# Patient Record
Sex: FEMALE | Race: BLACK OR AFRICAN AMERICAN | Employment: OTHER | URBAN - METROPOLITAN AREA
[De-identification: names, ages, dates, MRNs, and addresses within clinical notes are randomized per-mention and may not be internally consistent; named-entity substitution may affect disease eponyms.]

---

## 2017-01-27 ENCOUNTER — OP HISTORICAL/CONVERTED ENCOUNTER (OUTPATIENT)
Dept: OTHER | Age: 82
End: 2017-01-27

## 2017-06-16 ENCOUNTER — OP HISTORICAL/CONVERTED ENCOUNTER (OUTPATIENT)
Dept: OTHER | Age: 82
End: 2017-06-16

## 2018-07-03 ENCOUNTER — OP HISTORICAL/CONVERTED ENCOUNTER (OUTPATIENT)
Dept: OTHER | Age: 83
End: 2018-07-03

## 2019-07-31 ENCOUNTER — OP HISTORICAL/CONVERTED ENCOUNTER (OUTPATIENT)
Dept: OTHER | Age: 84
End: 2019-07-31

## 2019-12-19 ENCOUNTER — OP HISTORICAL/CONVERTED ENCOUNTER (OUTPATIENT)
Dept: OTHER | Age: 84
End: 2019-12-19

## 2020-05-27 ENCOUNTER — IP HISTORICAL/CONVERTED ENCOUNTER (OUTPATIENT)
Dept: OTHER | Age: 85
End: 2020-05-27

## 2020-09-29 DIAGNOSIS — Z12.31 VISIT FOR SCREENING MAMMOGRAM: ICD-10-CM

## 2020-11-17 ENCOUNTER — HOSPITAL ENCOUNTER (OUTPATIENT)
Dept: MAMMOGRAPHY | Age: 85
Discharge: HOME | End: 2020-11-17
Attending: INTERNAL MEDICINE
Payer: MEDICARE

## 2020-11-17 PROCEDURE — 77067 SCR MAMMO BI INCL CAD: CPT

## 2020-12-02 ENCOUNTER — APPOINTMENT (OUTPATIENT)
Dept: CT IMAGING | Age: 85
DRG: 100 | End: 2020-12-02
Attending: PHYSICIAN ASSISTANT
Payer: MEDICARE

## 2020-12-02 ENCOUNTER — HOSPITAL ENCOUNTER (INPATIENT)
Age: 85
LOS: 13 days | Discharge: NURSING, REHAB OR LONG-TERM CARE | DRG: 100 | End: 2020-12-15
Attending: EMERGENCY MEDICINE | Admitting: HOSPITALIST
Payer: MEDICARE

## 2020-12-02 ENCOUNTER — APPOINTMENT (OUTPATIENT)
Dept: GENERAL RADIOLOGY | Age: 85
DRG: 100 | End: 2020-12-02
Attending: EMERGENCY MEDICINE
Payer: MEDICARE

## 2020-12-02 ENCOUNTER — APPOINTMENT (OUTPATIENT)
Dept: CT IMAGING | Age: 85
DRG: 100 | End: 2020-12-02
Attending: EMERGENCY MEDICINE
Payer: MEDICARE

## 2020-12-02 DIAGNOSIS — I67.9 CEREBROVASCULAR DISEASE: ICD-10-CM

## 2020-12-02 DIAGNOSIS — Z20.822 SUSPECTED COVID-19 VIRUS INFECTION: ICD-10-CM

## 2020-12-02 DIAGNOSIS — R56.9 FOCAL SEIZURE (HCC): Primary | ICD-10-CM

## 2020-12-02 LAB
ABO + RH BLD: NORMAL
ALBUMIN SERPL-MCNC: 3.1 G/DL (ref 3.5–5)
ALBUMIN/GLOB SERPL: 0.7 {RATIO} (ref 1.1–2.2)
ALP SERPL-CCNC: 79 U/L (ref 45–117)
ALT SERPL-CCNC: 9 U/L (ref 12–78)
ANION GAP SERPL CALC-SCNC: 8 MMOL/L (ref 5–15)
AST SERPL W P-5'-P-CCNC: 14 U/L (ref 15–37)
BASOPHILS # BLD: 0 K/UL (ref 0–0.1)
BASOPHILS NFR BLD: 0 % (ref 0–1)
BILIRUB SERPL-MCNC: 0.4 MG/DL (ref 0.2–1)
BLOOD GROUP ANTIBODIES SERPL: NEGATIVE
BUN SERPL-MCNC: 27 MG/DL (ref 6–20)
BUN/CREAT SERPL: 25 (ref 12–20)
CA-I BLD-MCNC: 9 MG/DL (ref 8.5–10.1)
CHLORIDE SERPL-SCNC: 110 MMOL/L (ref 97–108)
CO2 SERPL-SCNC: 26 MMOL/L (ref 21–32)
CREAT SERPL-MCNC: 1.1 MG/DL (ref 0.55–1.02)
DIFFERENTIAL METHOD BLD: ABNORMAL
EOSINOPHIL # BLD: 0.1 K/UL (ref 0–0.4)
EOSINOPHIL NFR BLD: 1 % (ref 0–7)
ERYTHROCYTE [DISTWIDTH] IN BLOOD BY AUTOMATED COUNT: 16.5 % (ref 11.5–14.5)
GLOBULIN SER CALC-MCNC: 4.4 G/DL (ref 2–4)
GLUCOSE BLD STRIP.AUTO-MCNC: 170 MG/DL (ref 65–100)
GLUCOSE SERPL-MCNC: 169 MG/DL (ref 65–100)
HCT VFR BLD AUTO: 32.1 % (ref 35–47)
HGB BLD-MCNC: 9.6 G/DL (ref 11.5–16)
IMM GRANULOCYTES # BLD AUTO: 0 K/UL (ref 0–0.04)
IMM GRANULOCYTES NFR BLD AUTO: 0 % (ref 0–0.5)
INR PPP: 1 (ref 0.9–1.1)
LACTATE SERPL-SCNC: 2.8 MMOL/L (ref 0.4–2)
LYMPHOCYTES # BLD: 0.8 K/UL (ref 0.8–3.5)
LYMPHOCYTES NFR BLD: 14 % (ref 12–49)
MCH RBC QN AUTO: 23.4 PG (ref 26–34)
MCHC RBC AUTO-ENTMCNC: 29.9 G/DL (ref 30–36.5)
MCV RBC AUTO: 78.3 FL (ref 80–99)
MONOCYTES # BLD: 0.5 K/UL (ref 0–1)
MONOCYTES NFR BLD: 8 % (ref 5–13)
NEUTS SEG # BLD: 4.7 K/UL (ref 1.8–8)
NEUTS SEG NFR BLD: 77 % (ref 32–75)
PERFORMED BY, TECHID: ABNORMAL
PLATELET # BLD AUTO: 222 K/UL (ref 150–400)
PMV BLD AUTO: 11.5 FL (ref 8.9–12.9)
POTASSIUM SERPL-SCNC: 3.4 MMOL/L (ref 3.5–5.1)
PROT SERPL-MCNC: 7.5 G/DL (ref 6.4–8.2)
PROTHROMBIN TIME: 13.7 SEC (ref 11.9–14.7)
RBC # BLD AUTO: 4.1 M/UL (ref 3.8–5.2)
SODIUM SERPL-SCNC: 144 MMOL/L (ref 136–145)
SPECIMEN EXP DATE BLD: NORMAL
WBC # BLD AUTO: 6 K/UL (ref 3.6–11)

## 2020-12-02 PROCEDURE — 99285 EMERGENCY DEPT VISIT HI MDM: CPT

## 2020-12-02 PROCEDURE — 36415 COLL VENOUS BLD VENIPUNCTURE: CPT

## 2020-12-02 PROCEDURE — 74011250636 HC RX REV CODE- 250/636

## 2020-12-02 PROCEDURE — 74011000636 HC RX REV CODE- 636: Performed by: EMERGENCY MEDICINE

## 2020-12-02 PROCEDURE — 93005 ELECTROCARDIOGRAM TRACING: CPT

## 2020-12-02 PROCEDURE — 74011250636 HC RX REV CODE- 250/636: Performed by: EMERGENCY MEDICINE

## 2020-12-02 PROCEDURE — 96374 THER/PROPH/DIAG INJ IV PUSH: CPT

## 2020-12-02 PROCEDURE — 86900 BLOOD TYPING SEROLOGIC ABO: CPT

## 2020-12-02 PROCEDURE — 74011250636 HC RX REV CODE- 250/636: Performed by: HOSPITALIST

## 2020-12-02 PROCEDURE — 70450 CT HEAD/BRAIN W/O DYE: CPT

## 2020-12-02 PROCEDURE — 65270000029 HC RM PRIVATE

## 2020-12-02 PROCEDURE — 85025 COMPLETE CBC W/AUTO DIFF WBC: CPT

## 2020-12-02 PROCEDURE — 85610 PROTHROMBIN TIME: CPT

## 2020-12-02 PROCEDURE — 96372 THER/PROPH/DIAG INJ SC/IM: CPT

## 2020-12-02 PROCEDURE — 71045 X-RAY EXAM CHEST 1 VIEW: CPT

## 2020-12-02 PROCEDURE — 82962 GLUCOSE BLOOD TEST: CPT

## 2020-12-02 PROCEDURE — 96375 TX/PRO/DX INJ NEW DRUG ADDON: CPT

## 2020-12-02 PROCEDURE — 80053 COMPREHEN METABOLIC PANEL: CPT

## 2020-12-02 PROCEDURE — 70496 CT ANGIOGRAPHY HEAD: CPT

## 2020-12-02 PROCEDURE — 74011000250 HC RX REV CODE- 250: Performed by: EMERGENCY MEDICINE

## 2020-12-02 PROCEDURE — 83605 ASSAY OF LACTIC ACID: CPT

## 2020-12-02 RX ORDER — LORAZEPAM 2 MG/ML
INJECTION INTRAMUSCULAR
Status: COMPLETED
Start: 2020-12-02 | End: 2020-12-02

## 2020-12-02 RX ORDER — LORAZEPAM 2 MG/ML
1 INJECTION INTRAMUSCULAR
Status: COMPLETED | OUTPATIENT
Start: 2020-12-02 | End: 2020-12-02

## 2020-12-02 RX ORDER — ATORVASTATIN CALCIUM 40 MG/1
40 TABLET, FILM COATED ORAL DAILY
Status: DISCONTINUED | OUTPATIENT
Start: 2020-12-03 | End: 2020-12-15 | Stop reason: HOSPADM

## 2020-12-02 RX ORDER — LEVETIRACETAM 10 MG/ML
1000 INJECTION INTRAVASCULAR ONCE
Status: COMPLETED | OUTPATIENT
Start: 2020-12-02 | End: 2020-12-02

## 2020-12-02 RX ORDER — LABETALOL HCL 20 MG/4 ML
20 SYRINGE (ML) INTRAVENOUS
Status: DISCONTINUED | OUTPATIENT
Start: 2020-12-02 | End: 2020-12-15 | Stop reason: HOSPADM

## 2020-12-02 RX ORDER — GUAIFENESIN 100 MG/5ML
81 LIQUID (ML) ORAL DAILY
Status: DISCONTINUED | OUTPATIENT
Start: 2020-12-03 | End: 2020-12-03

## 2020-12-02 RX ORDER — LORAZEPAM 2 MG/ML
5 INJECTION INTRAMUSCULAR
Status: COMPLETED | OUTPATIENT
Start: 2020-12-02 | End: 2020-12-02

## 2020-12-02 RX ORDER — LABETALOL HYDROCHLORIDE 5 MG/ML
80 INJECTION, SOLUTION INTRAVENOUS ONCE
Status: COMPLETED | OUTPATIENT
Start: 2020-12-02 | End: 2020-12-02

## 2020-12-02 RX ORDER — LEVETIRACETAM 5 MG/ML
500 INJECTION INTRAVASCULAR EVERY 12 HOURS
Status: DISCONTINUED | OUTPATIENT
Start: 2020-12-02 | End: 2020-12-06

## 2020-12-02 RX ADMIN — LABETALOL HYDROCHLORIDE 80 MG: 5 INJECTION INTRAVENOUS at 11:11

## 2020-12-02 RX ADMIN — LEVETIRACETAM 500 MG: 5 INJECTION INTRAVENOUS at 17:06

## 2020-12-02 RX ADMIN — LORAZEPAM 1 MG: 2 INJECTION INTRAMUSCULAR; INTRAVENOUS at 11:12

## 2020-12-02 RX ADMIN — LEVETIRACETAM 1000 MG: 10 INJECTION INTRAVENOUS at 14:44

## 2020-12-02 RX ADMIN — LORAZEPAM 5 MG: 2 INJECTION INTRAMUSCULAR; INTRAVENOUS at 11:18

## 2020-12-02 RX ADMIN — IOPAMIDOL 100 ML: 755 INJECTION, SOLUTION INTRAVENOUS at 13:04

## 2020-12-02 RX ADMIN — LORAZEPAM 1 MG: 2 INJECTION INTRAMUSCULAR at 11:12

## 2020-12-02 NOTE — ED PROVIDER NOTES
HPI   Chief Complaint   Patient presents with    Seizure   71-year-old female hx CVA with previous left side weakness 2019 presents with seizure. Per EMS patient's daughter called regarding seizure, they found patient awake alert oriented, complaining of left-sided headache and dizziness, glucose 200, patient's left eye was fluttering. On route patient began to lean to the left with gaze to the left and left facial droop. Unknown last normal.  When I saw the patient she was very lethargic and not able to give history or do review of system. Past Medical History:   Diagnosis Date    Breast cancer [facility])      History reviewed. No pertinent surgical history. No family history on file. ALLERGIES: Patient has no known allergies. Review of Systems   Unable to perform ROS: Mental status change       Vitals:    12/02/20 1042 12/02/20 1215   BP: (!) 218/100 112/66   Pulse: (!) 118 66   Resp: 16    Temp: 98.6 °F (37 °C)    SpO2: 94% 97%   Weight: 113.4 kg (250 lb)    Height: 5' 7\" (1.702 m)             Physical Exam   Patient Vitals for the past 8 hrs:   Temp Pulse Resp BP SpO2   12/02/20 1215  66  112/66 97 %   12/02/20 1042 98.6 °F (37 °C) (!) 118 16 (!) 218/100 94 %        Nursing note and vitals reviewed. Constitutional: Ill appearing. Lethargic. Head: Normocephalic and atraumatic. Mouth/Throat: Airway patent. Moist mucous membranes. Eyes: EOMI. No scleral icterus. Neck: Neck supple. Cardiovascular: Tachy rate, regular rhythm. Normal heart sounds. No murmur, rub, or gallop. Good pulses throughout. Pulmonary/Chest: No respiratory distress. Clear to auscultation bilaterally. No wheezes, rales, or rhonchi. Abdominal/GI: BS normal, Soft, non-tender, non-distended. No rebound or guarding. Musculoskeletal: No gross injuries or deformities. Neurological: Awake and oriented to self and place. LUE and LLE rhythmic tremors.  Not following commands on left side, following commands to move both RUE and RLE.  Left facial droop. Psych: Unable to assess. Skin: Skin is warm and dry. No rash noted. MDM   Ddx = intracranial hemorrhage, CVA, focal seizure, hypertensive encephalopathy, metabolic disarray, infection. EKG read by me at 1107 showing sinus tachycardia rate 111, several PACs, no STEMI, there is LVH. Normal axis and normal intervals. Patient was stroke alerted on arrival.  Patient persistently in partial left-sided seizure, for more than 15 minutes, given Ativan 5 mg IV. Patient seizure immediately stopped by she became sleepy, requiring 2 L of nasal cannula. Patient is loaded with 1 g of Keppra IV. Patient is given labetalol IV for her tachycardia, on reassessment her tachycardia improved. Patient initially went for CT head without contrast, not showing any acute hemorrhage. Consulted stroke tele neurologist recommends CTA head and neck, admission, EEG, MRI brain, Keppra. I reviewed CTA head and neck result as below, it incidentally found groundglass appearance in her lungs. I ordered COVID-19 test.  I consulted Dr. Carlito Badillo hospitalist, admitting.   Labs Reviewed   CBC WITH AUTOMATED DIFF - Abnormal; Notable for the following components:       Result Value    HGB 9.6 (*)     HCT 32.1 (*)     MCV 78.3 (*)     MCH 23.4 (*)     MCHC 29.9 (*)     RDW 16.5 (*)     NEUTROPHILS 77 (*)     All other components within normal limits   METABOLIC PANEL, COMPREHENSIVE - Abnormal; Notable for the following components:    Potassium 3.4 (*)     Chloride 110 (*)     Glucose 169 (*)     BUN 27 (*)     Creatinine 1.10 (*)     BUN/Creatinine ratio 25 (*)     GFR est AA 57 (*)     GFR est non-AA 47 (*)     AST (SGOT) 14 (*)     ALT (SGPT) 9 (*)     Albumin 3.1 (*)     Globulin 4.4 (*)     A-G Ratio 0.7 (*)     All other components within normal limits   LACTIC ACID - Abnormal; Notable for the following components:    Lactic acid 2.8 (*)     All other components within normal limits   GLUCOSE, POC - Abnormal; Notable for the following components:    Glucose (POC) 170 (*)     All other components within normal limits   PROTHROMBIN TIME + INR   URINALYSIS W/ REFLEX CULTURE   SARS-COV-2   TYPE & SCREEN     CTA CODE NEURO HEAD AND NECK W CONT   Final Result   Impression:    1. Mild nonflow-limiting narrowing of the right carotid bifurcation by NASA   criteria. 2. Moderate to high-grade flow-limiting narrowing of the distal M1 segment of   the right MCA. 3. Moderate flow-limiting narrowing of the origin of the anterior division of   the M2 segment of the left MCA. 4. Patchy groundglass opacity right upper lobe most consistent with pneumonia   with COVID-19 not excluded. CT chest without contrast recommended. Multiple attempts to reach the emergency department were unsuccessful. Please note that all carotid bifurcation stenoses are measured as per NASCET   criteria. CT CODE NEURO HEAD WO CONTRAST   Final Result   Impression: No acute intracranial process. Interval development of   encephalomalacia in the right parieto-occipital lobe. Findings conveyed to Nuria Branham on 12/2/2020 at 11:13 AM.                  XR CHEST SNGL V    (Results Pending)     Medications   LORazepam (ATIVAN) injection 1 mg (1 mg IntraMUSCular Given 12/2/20 1112)   labetaloL (NORMODYNE;TRANDATE) injection 80 mg (80 mg IntraVENous Given 12/2/20 1111)   LORazepam (ATIVAN) injection 5 mg (5 mg IntraVENous Given 12/2/20 1118)   levETIRAcetam in saline (iso-os) (KEPPRA) infusion 1,000 mg (1,000 mg IntraVENous New Bag 12/2/20 1444)   iopamidoL (ISOVUE-370) 76 % injection 100 mL (100 mL IntraVENous Given 12/2/20 1304)     Tejal RAZA MD, am  the first and primary ED provider for this patient. Critical Care  Performed by: Yasmine Salmon MD  Authorized by:  Yasmine Salmon MD     Critical care provider statement:     Critical care time (minutes):  60    Critical care time was exclusive of:  Separately billable procedures and treating other patients and teaching time    Critical care was necessary to treat or prevent imminent or life-threatening deterioration of the following conditions:  CNS failure or compromise    Critical care was time spent personally by me on the following activities:  Blood draw for specimens, development of treatment plan with patient or surrogate, discussions with consultants, evaluation of patient's response to treatment, examination of patient, obtaining history from patient or surrogate, review of old charts, re-evaluation of patient's condition, ordering and performing treatments and interventions, ordering and review of laboratory studies, ordering and review of radiographic studies and pulse oximetry

## 2020-12-02 NOTE — H&P
History and Physical    Subjective:   Chief Complaint : seizure-like activity                                Worsening left sided weakness prior to arrival.   Source of information : EMS, patients grand-daughter. granddaughter mentioned she isn't aware of her meds taken at home, but denies any h/o seizures or has a residual left sided weakness from stroke in 2019   History of present illness:     89F, h.o breast cancer and stroke (without residual weakness) with seizure like activity and left sided weakness prior to arrival.     From EMS and charts, family she was having seizure-like activity while at home . Describes as twitching of left eye few seconds. EMS was called in while enroute to ER, EMS noticed pt was repeating herself along with left sided hand weakness,leaning to the left, and gazing to the left  that developed during transportsi    talking to the grand daughter, at baseline she is ambulatory with walker. Also has a left sided weakness from stroke in 2019  Past Medical History:   Diagnosis Date    [facility]     [facility] (cerebrum) ([facility] 2019     History reviewed. No pertinent surgical history. History reviewed. No pertinent family history. Social History     Tobacco Use    Smoking status: Not on file   Substance Use Topics    Alcohol use: Not on file       Prior to Admission medications    Not on File     No Known Allergies          Review of Systems:  Unable to perform ROS: Mental status change     Vitals:     Visit Vitals  /66   Pulse 66   Temp 98.6 °F (37 °C)   Resp 16   Ht 5' 7\" (1.702 m)   Wt 113.4 kg (250 lb)   SpO2 97%   BMI 39.16 kg/m²       Physical Exam:   Constitutional: Ill appearing. Lethargic. Head: Normocephalic and atraumatic. Mouth/Throat: Airway patent. Moist mucous membranes. Eyes: EOMI. No scleral icterus. Neck: Neck supple. Cardiovascular: Tachy rate, regular rhythm. Normal heart sounds. No murmur, rub, or gallop.  Good pulses throughout. Pulmonary/Chest: No respiratory distress. Clear to auscultation bilaterally. No wheezes, rales, or rhonchi. Abdominal/GI: BS normal, Soft, non-tender, non-distended. No rebound or guarding. Musculoskeletal: No gross injuries or deformities. Neurological: Awake and oriented to self and place. LUE and LLE rhythmic tremors. Not following commands on left side, following commands to move both RUE and RLE. Left facial droop. Psych: Unable to assess. Skin: Skin is warm and dry. No rash noted. Data Review:   Recent Results (from the past 24 hour(s))   GLUCOSE, POC    Collection Time: 12/02/20 11:12 AM   Result Value Ref Range    Glucose (POC) 170 (H) 65 - 100 mg/dL    Performed by Kwame Owens    CBC WITH AUTOMATED DIFF    Collection Time: 12/02/20 11:30 AM   Result Value Ref Range    WBC 6.0 3.6 - 11.0 K/uL    RBC 4.10 3.80 - 5.20 M/uL    HGB 9.6 (L) 11.5 - 16.0 g/dL    HCT 32.1 (L) 35.0 - 47.0 %    MCV 78.3 (L) 80.0 - 99.0 FL    MCH 23.4 (L) 26.0 - 34.0 PG    MCHC 29.9 (L) 30.0 - 36.5 g/dL    RDW 16.5 (H) 11.5 - 14.5 %    PLATELET 802 721 - 758 K/uL    MPV 11.5 8.9 - 12.9 FL    NEUTROPHILS 77 (H) 32 - 75 %    LYMPHOCYTES 14 12 - 49 %    MONOCYTES 8 5 - 13 %    EOSINOPHILS 1 0 - 7 %    BASOPHILS 0 0 - 1 %    IMMATURE GRANULOCYTES 0 0.0 - 0.5 %    ABS. NEUTROPHILS 4.7 1.8 - 8.0 K/UL    ABS. LYMPHOCYTES 0.8 0.8 - 3.5 K/UL    ABS. MONOCYTES 0.5 0.0 - 1.0 K/UL    ABS. EOSINOPHILS 0.1 0.0 - 0.4 K/UL    ABS. BASOPHILS 0.0 0.0 - 0.1 K/UL    ABS. IMM.  GRANS. 0.0 0.00 - 0.04 K/UL    DF AUTOMATED     METABOLIC PANEL, COMPREHENSIVE    Collection Time: 12/02/20 11:30 AM   Result Value Ref Range    Sodium 144 136 - 145 mmol/L    Potassium 3.4 (L) 3.5 - 5.1 mmol/L    Chloride 110 (H) 97 - 108 mmol/L    CO2 26 21 - 32 mmol/L    Anion gap 8 5 - 15 mmol/L    Glucose 169 (H) 65 - 100 mg/dL    BUN 27 (H) 6 - 20 mg/dL    Creatinine 1.10 (H) 0.55 - 1.02 mg/dL    BUN/Creatinine ratio 25 (H) 12 - 20      GFR est AA 57 (L) >60 ml/min/1.73m2    GFR est non-AA 47 (L) >60 ml/min/1.73m2    Calcium 9.0 8.5 - 10.1 mg/dL    Bilirubin, total 0.4 0.2 - 1.0 mg/dL    AST (SGOT) 14 (L) 15 - 37 U/L    ALT (SGPT) 9 (L) 12 - 78 U/L    Alk. phosphatase 79 45 - 117 U/L    Protein, total 7.5 6.4 - 8.2 g/dL    Albumin 3.1 (L) 3.5 - 5.0 g/dL    Globulin 4.4 (H) 2.0 - 4.0 g/dL    A-G Ratio 0.7 (L) 1.1 - 2.2     PROTHROMBIN TIME + INR    Collection Time: 12/02/20 11:30 AM   Result Value Ref Range    Prothrombin time 13.7 11.9 - 14.7 sec    INR 1.0 0.9 - 1.1     LACTIC ACID    Collection Time: 12/02/20 11:30 AM   Result Value Ref Range    Lactic acid 2.8 (HH) 0.4 - 2.0 mmol/L   TYPE & SCREEN    Collection Time: 12/02/20 11:30 AM   Result Value Ref Range    Crossmatch Expiration 12/05/2020,2359     ABO/Rh(D) Bhargav Ricks Positive     Antibody screen Negative              Assessment and Plan :     (1) partial seizures Vs complex migraine : keppra 500mg BID. Seizures precaution. LA in AM.      (2) probable stroke: aspirin, statin, lipid panel, A1c, speech, PT OT tomorrow. Consult neurology    (3) HTN urgency: will order labetalol 10 Q4H PRN for BP> 542 systolic    (4) h/o CVA: complicates #1    (5) RENAN: s/t prerenal. LR bolus. Repeat in AM    (6) hypokalemia:     DVT ppx: heparin SubQ, pending home meds.      Signed By: Brijesh Frederick MD     December 2, 2020

## 2020-12-02 NOTE — CONSULTS
Neurology Consult Note    HPI  Ms. Lay Proctor is a 80 y.o.  female with a history significant for Stroke with LHB weakness, Seizures, Breast Cancer who presented with seizure like activity. Per chart review, patient reportedly had seizure-like activity seen by her daughter. EMS arrived and found patient complaining of a left-sided headache and dizziness. On transport to [facility] patient had some left eye fluttering along with left side gaze deviation and left facial droop. By this time physicians in the ED evaluated patient she was quite lethargic. Initial blood pressures were elevated to 220/100s and elevated pulse to the 120s. Patient was loaded with IV levetiracetam 1 g. Code stroke was initiated and teleneurology recommended CTA head/neck. Patient was not deemed a candidate for TPA. Patient is confused on examination and is oriented only to person. Home AEDs   Unknown     [facility]     [facility] but interval development of right parietooccipital infarct compared to [facility] from 12/2019    CTA Head/Neck  High-grade stenosis in the right M1  Moderate grade stenosis in the left M2  25 to 30% atherosclerotic disease in the right carotid bifurcation  Patchy groundglass opacities in the right upper lobe likely signifying pneumonia       Lab Studies  -Positive: LA 2.8      IMPRESSION  Ms. Lay Proctor is a 80 y.o.  female with the above history presented with seizure like activity. Patient reportedly had seizure witnessed by patient's daughter. When patient was being transported by EMS patient had left-sided gaze preference left facial droop and tremulousness on the left side of the body. Code stroke was initiated when patient arrived to [facility]. Patient's initial blood pressures were elevated as was her pulse.   Emergent CT head was negative for any acute findings but she did have an interval right parieto-occipital infarct compared to a CT from December 2019. Teleneurology recommended CTA head/neck which revealed high-grade stenosis in the right M1 and moderate grade stenosis in the left M2 but also revealed possible infection in the right upper lung. Patient was subsequently loaded with IV levetiracetam 1000. Etiology of patient's breakthrough seizure most likely related to underlying infection lowering her seizure threshold. Current encephalopathy possibly related to postictal phenomenon which can last up to 36 hours at times. Will continue levetiracetam 500 BID for now. Will obtain MRI brain to evaluate for subacute ischemia which may also contribute to a lowered seizure threshold. We will continue patient on antiplatelet and statin therapy per below for the time being. Patient will need further stroke work-up if MRI is consistent with acute/subacute ischemia. RECOMMENDATIONS      Breakthrough Seizure  Associated: Likely PNA  -Q4Hr NeuroChecks, TELE  -Stroke Prophylaxis: , Atorvastatin 40  --> Will consider dual antiplatelet therapy if patient has acute/subacute ischemia in the right MCA or left MCA territories.  -Seizure Precautions  -Continue levetiracetam 500 BID  -STAT IV Lorazepam 2 mg with any clinical seizure activity lasting > 3 minutes and contact Neurology for further recommendations  -F/U MRI Brain WO  --> If acute/subacute ischemia is present and patient will need TTE, TSH, HgbA1c and lipid panel      Diagnosis and plan was discussed extensively with patient who is in agreement with the above plan. They have been counseled regarding potential side effects of the interventions above and would like to proceed with the aforementioned plan.       Review of Systems  Unable to ascertain due to mental status    Physical/Neurological Exam  General: Elderly -American female  Cardiovascular: normal rate and rhythm   Pulmonary: CTAB   Gastrointestinal/Abdomen: soft w/hypoactive bowel sounds   Skin: warm and dry      Patient is awake and follow central/peripheral commands at times not persistently  Unable to properly test for aphasia but patient is able to produce speech; mild dysarthria  Pupils react to light bilaterally; EOM Intact   Blink to threat intact bilaterally  Unable to visualize optic disc had a retinal vessels on funduscopic examination   Intact to light touch on face bilaterally   No facial droop   No gross hearing loss   Tongue is midline   Motor   LUE: 2/5  LLE: Antigravity   RHB: Antigravity  No abnormal movements   Increased tone in the left hemibody especially the left upper extremity  Sensation to pain intact throughout  Reflexes: Absent bilateral ankles, trace in bilateral knees, 1+ in bilateral brachioradialis  Toes equivocal bilaterally  Gait deferred     NIHSS: 12      Past Medical History:   Diagnosis Date    [facility] ([facility]     [facility]      History reviewed. No pertinent surgical history. No Known Allergies  History reviewed. No pertinent family history. Relationships   Social connections    Talks on phone: Not on file    Gets together: Not on file    Attends Buddhism service: Not on file    Active member of club or organization: Not on file    Attends meetings of clubs or organizations: Not on file    Relationship status: Not on file         Medications  No current outpatient medications    Current Facility-Administered Medications   Medication Dose Route Frequency    levETIRAcetam (KEPPRA) 500 mg in saline (iso-osm) 100 mL IVPB (premix)  500 mg IntraVENous Q12H    [START ON 12/3/2020] aspirin chewable tablet 81 mg  81 mg Oral DAILY    [START ON 12/3/2020] atorvastatin (LIPITOR) tablet 40 mg  40 mg Oral DAILY     No current outpatient medications on file.         Objective  Temp:  [98.6 °F (37 °C)]   Pulse (Heart Rate):  []   BP: (112-218)/()   Resp Rate:  [16]   O2 Sat (%):  [94 %-97 %]   Weight:  [113.4 kg (250 lb)]   No intake or output data in the 24 hours ending 12/02/20 1536  Wt Readings from Last 3 Encounters:   12/02/20 113.4 kg (250 lb)        Labs  Recent Results (from the past 24 hour(s))   GLUCOSE, POC    Collection Time: 12/02/20 11:12 AM   Result Value Ref Range    Glucose (POC) 170 (H) 65 - 100 mg/dL    Performed by Zina Austin    CBC WITH AUTOMATED DIFF    Collection Time: 12/02/20 11:30 AM   Result Value Ref Range    WBC 6.0 3.6 - 11.0 K/uL    RBC 4.10 3.80 - 5.20 M/uL    HGB 9.6 (L) 11.5 - 16.0 g/dL    HCT 32.1 (L) 35.0 - 47.0 %    MCV 78.3 (L) 80.0 - 99.0 FL    MCH 23.4 (L) 26.0 - 34.0 PG    MCHC 29.9 (L) 30.0 - 36.5 g/dL    RDW 16.5 (H) 11.5 - 14.5 %    PLATELET 750 783 - 038 K/uL    MPV 11.5 8.9 - 12.9 FL    NEUTROPHILS 77 (H) 32 - 75 %    LYMPHOCYTES 14 12 - 49 %    MONOCYTES 8 5 - 13 %    EOSINOPHILS 1 0 - 7 %    BASOPHILS 0 0 - 1 %    IMMATURE GRANULOCYTES 0 0.0 - 0.5 %    ABS. NEUTROPHILS 4.7 1.8 - 8.0 K/UL    ABS. LYMPHOCYTES 0.8 0.8 - 3.5 K/UL    ABS. MONOCYTES 0.5 0.0 - 1.0 K/UL    ABS. EOSINOPHILS 0.1 0.0 - 0.4 K/UL    ABS. BASOPHILS 0.0 0.0 - 0.1 K/UL    ABS. IMM. GRANS. 0.0 0.00 - 0.04 K/UL    DF AUTOMATED     METABOLIC PANEL, COMPREHENSIVE    Collection Time: 12/02/20 11:30 AM   Result Value Ref Range    Sodium 144 136 - 145 mmol/L    Potassium 3.4 (L) 3.5 - 5.1 mmol/L    Chloride 110 (H) 97 - 108 mmol/L    CO2 26 21 - 32 mmol/L    Anion gap 8 5 - 15 mmol/L    Glucose 169 (H) 65 - 100 mg/dL    BUN 27 (H) 6 - 20 mg/dL    Creatinine 1.10 (H) 0.55 - 1.02 mg/dL    BUN/Creatinine ratio 25 (H) 12 - 20      GFR est AA 57 (L) >60 ml/min/1.73m2    GFR est non-AA 47 (L) >60 ml/min/1.73m2    Calcium 9.0 8.5 - 10.1 mg/dL    Bilirubin, total 0.4 0.2 - 1.0 mg/dL    AST (SGOT) 14 (L) 15 - 37 U/L    ALT (SGPT) 9 (L) 12 - 78 U/L    Alk.  phosphatase 79 45 - 117 U/L    Protein, total 7.5 6.4 - 8.2 g/dL    Albumin 3.1 (L) 3.5 - 5.0 g/dL    Globulin 4.4 (H) 2.0 - 4.0 g/dL    A-G Ratio 0.7 (L) 1.1 - 2.2     PROTHROMBIN TIME + INR Collection Time: 12/02/20 11:30 AM   Result Value Ref Range    Prothrombin time 13.7 11.9 - 14.7 sec    INR 1.0 0.9 - 1.1     LACTIC ACID    Collection Time: 12/02/20 11:30 AM   Result Value Ref Range    Lactic acid 2.8 (HH) 0.4 - 2.0 mmol/L   TYPE & SCREEN    Collection Time: 12/02/20 11:30 AM   Result Value Ref Range    Crossmatch Expiration 12/05/2020,2359     ABO/Rh(D) O Positive     Antibody screen Negative           Significant Diagnostic Studies  All images independently visualized    XR CHEST SNGL V   Final Result      CTA CODE NEURO HEAD AND NECK W CONT   Final Result   Impression:    1. Mild nonflow-limiting narrowing of the right carotid bifurcation by NASA   criteria. 2. Moderate to high-grade flow-limiting narrowing of the distal M1 segment of   the right MCA. 3. Moderate flow-limiting narrowing of the origin of the anterior division of   the M2 segment of the left MCA. 4. Patchy groundglass opacity right upper lobe most consistent with pneumonia   with COVID-19 not excluded. CT chest without contrast recommended. Multiple attempts to reach the emergency department were unsuccessful. Please note that all carotid bifurcation stenoses are measured as per NASCET   criteria. CT CODE NEURO HEAD WO CONTRAST   Final Result   Impression: No acute intracranial process. Interval development of   encephalomalacia in the right parieto-occipital lobe. Findings conveyed to [employer] on 12/2/2020 at 11:13 AM.                        This document has been prepared by the Dragon voice recognition system, typographical errors may have occurred.  Attempts have been made to correct errors, however inadvertent errors may persist.

## 2020-12-02 NOTE — ED TRIAGE NOTES
EMS was called for seizure upon arrival pt's daughter stated that she saw her eye flutter, no LOC or incont noted; pt was awake and oriented pt c/o left sided HA and dizziness ; during their transport EMS noticed pt was repeating herself along with left sided hand weakness,leaning to the left, and gazing to the left  that developed during transport

## 2020-12-03 ENCOUNTER — APPOINTMENT (OUTPATIENT)
Dept: MRI IMAGING | Age: 85
DRG: 100 | End: 2020-12-03
Attending: STUDENT IN AN ORGANIZED HEALTH CARE EDUCATION/TRAINING PROGRAM
Payer: MEDICARE

## 2020-12-03 LAB
ANION GAP SERPL CALC-SCNC: 6 MMOL/L (ref 5–15)
BUN SERPL-MCNC: 20 MG/DL (ref 6–20)
BUN/CREAT SERPL: 22 (ref 12–20)
CA-I BLD-MCNC: 9.5 MG/DL (ref 8.5–10.1)
CHLORIDE SERPL-SCNC: 110 MMOL/L (ref 97–108)
CHOLEST SERPL-MCNC: 190 MG/DL
CO2 SERPL-SCNC: 27 MMOL/L (ref 21–32)
CREAT SERPL-MCNC: 0.92 MG/DL (ref 0.55–1.02)
EST. AVERAGE GLUCOSE BLD GHB EST-MCNC: 117 MG/DL
GLUCOSE SERPL-MCNC: 113 MG/DL (ref 65–100)
HBA1C MFR BLD: 5.7 % (ref 4–5.6)
HDLC SERPL-MCNC: 59 MG/DL
HDLC SERPL: 3.2 {RATIO} (ref 0–5)
LDLC SERPL CALC-MCNC: 107 MG/DL (ref 0–100)
LIPID PROFILE,FLP: ABNORMAL
POTASSIUM SERPL-SCNC: 3.3 MMOL/L (ref 3.5–5.1)
PROCALCITONIN SERPL-MCNC: <0.05 NG/ML
SODIUM SERPL-SCNC: 143 MMOL/L (ref 136–145)
TRIGL SERPL-MCNC: 120 MG/DL (ref ?–150)
VLDLC SERPL CALC-MCNC: 24 MG/DL

## 2020-12-03 PROCEDURE — 97530 THERAPEUTIC ACTIVITIES: CPT

## 2020-12-03 PROCEDURE — 84145 PROCALCITONIN (PCT): CPT

## 2020-12-03 PROCEDURE — 80061 LIPID PANEL: CPT

## 2020-12-03 PROCEDURE — 65270000029 HC RM PRIVATE

## 2020-12-03 PROCEDURE — 94762 N-INVAS EAR/PLS OXIMTRY CONT: CPT

## 2020-12-03 PROCEDURE — 77010033678 HC OXYGEN DAILY

## 2020-12-03 PROCEDURE — 97161 PT EVAL LOW COMPLEX 20 MIN: CPT

## 2020-12-03 PROCEDURE — 92610 EVALUATE SWALLOWING FUNCTION: CPT

## 2020-12-03 PROCEDURE — 70551 MRI BRAIN STEM W/O DYE: CPT

## 2020-12-03 PROCEDURE — 97165 OT EVAL LOW COMPLEX 30 MIN: CPT

## 2020-12-03 PROCEDURE — 97166 OT EVAL MOD COMPLEX 45 MIN: CPT

## 2020-12-03 PROCEDURE — 36415 COLL VENOUS BLD VENIPUNCTURE: CPT

## 2020-12-03 PROCEDURE — 74011250636 HC RX REV CODE- 250/636: Performed by: HOSPITALIST

## 2020-12-03 PROCEDURE — 74011250637 HC RX REV CODE- 250/637: Performed by: HOSPITALIST

## 2020-12-03 PROCEDURE — 80048 BASIC METABOLIC PNL TOTAL CA: CPT

## 2020-12-03 PROCEDURE — 83036 HEMOGLOBIN GLYCOSYLATED A1C: CPT

## 2020-12-03 RX ORDER — LORAZEPAM 2 MG/ML
2 INJECTION INTRAMUSCULAR ONCE
Status: DISCONTINUED | OUTPATIENT
Start: 2020-12-03 | End: 2020-12-03

## 2020-12-03 RX ORDER — ASPIRIN 325 MG
325 TABLET ORAL DAILY
Status: DISCONTINUED | OUTPATIENT
Start: 2020-12-04 | End: 2020-12-15 | Stop reason: HOSPADM

## 2020-12-03 RX ORDER — LISINOPRIL 10 MG/1
10 TABLET ORAL DAILY
Status: DISCONTINUED | OUTPATIENT
Start: 2020-12-03 | End: 2020-12-05

## 2020-12-03 RX ORDER — AMLODIPINE BESYLATE 5 MG/1
10 TABLET ORAL DAILY
Status: DISCONTINUED | OUTPATIENT
Start: 2020-12-03 | End: 2020-12-15 | Stop reason: HOSPADM

## 2020-12-03 RX ORDER — HALOPERIDOL 5 MG/ML
5 INJECTION INTRAMUSCULAR ONCE
Status: COMPLETED | OUTPATIENT
Start: 2020-12-03 | End: 2020-12-03

## 2020-12-03 RX ORDER — CARVEDILOL 12.5 MG/1
12.5 TABLET ORAL 2 TIMES DAILY WITH MEALS
Status: DISCONTINUED | OUTPATIENT
Start: 2020-12-03 | End: 2020-12-05 | Stop reason: ALTCHOICE

## 2020-12-03 RX ADMIN — CARVEDILOL 12.5 MG: 12.5 TABLET, FILM COATED ORAL at 15:43

## 2020-12-03 RX ADMIN — LEVETIRACETAM 500 MG: 5 INJECTION INTRAVENOUS at 14:30

## 2020-12-03 RX ADMIN — AZITHROMYCIN MONOHYDRATE 500 MG: 500 INJECTION, POWDER, LYOPHILIZED, FOR SOLUTION INTRAVENOUS at 08:21

## 2020-12-03 RX ADMIN — AMLODIPINE BESYLATE 10 MG: 5 TABLET ORAL at 15:42

## 2020-12-03 RX ADMIN — ATORVASTATIN CALCIUM 40 MG: 40 TABLET, FILM COATED ORAL at 08:22

## 2020-12-03 RX ADMIN — HALOPERIDOL LACTATE 5 MG: 5 INJECTION, SOLUTION INTRAMUSCULAR at 11:00

## 2020-12-03 RX ADMIN — LEVETIRACETAM 500 MG: 5 INJECTION INTRAVENOUS at 03:46

## 2020-12-03 RX ADMIN — LISINOPRIL 10 MG: 10 TABLET ORAL at 15:43

## 2020-12-03 RX ADMIN — LABETALOL HYDROCHLORIDE 20 MG: 5 INJECTION, SOLUTION INTRAVENOUS at 03:46

## 2020-12-03 NOTE — PROGRESS NOTES
13: 10PM Outbound call to listed emergency contact Kirti Bah/cas @ [phone]. VM left w/direct contact information requesting a return phone call.          LAKIA Mcbride

## 2020-12-03 NOTE — PROGRESS NOTES
OCCUPATIONAL THERAPY EVALUATION  Patient: Erendira Pascual (81 y.o. female)  Date: 12/3/2020  Primary Diagnosis: Seizure [facility]) [R56.9]        Precautions: Fall risk, COVID r/o at time of eval, seizure       ASSESSMENT  Pt is an 81 y/o F with PMH of breast CA and CVA 2019, presenting to [facility] with seizure like activity and left sided weakness. Per notes, family described twitching of L eye for a few seconds, EMS noticed pt was repeating herself along with L sided hand weakness, leaning to left, and gazing to left that developed during transportation. Pt admitted 12/2/20 and being treated for partial seizures vs. complex migraine, probable stroke, HTN urgency, RENAN, hypokalemia, PNA. Pt received lying backwards supine in bed upon arrival with IV tangled, O2 off, and casual t-shirt and brief on. OT/PT roles explained and pt agreeable to evaluations at this time, AxO to person, place, and year (cues for month) however with intermittent confusion noted and difficulty following multi-step commands. Pt states she \"sometimes\" lives with her daughter and that her daughter helps her with ADLs at [facility]. Per notes, pt is ambulatory with a RW at baseline. Unable to gather any further social information at this time 2' to cognition. Based on current observations, pt presents with deficits in generalized strength/AROM (LUE>RUE), static/dynamic sitting balance, static/dynamic standing balance, functional activity tolerance, fine/gross motor coordination, cognition, and increased back pain impacting overall performance of ADLs and functional transfers/mobility. Pt currently requires mod A x2 for bed mobility, total A to don socks EOB, and max A to doff casual shirt/don clean gown while seated EOB. Oral care completed with max-total A with pt able to grasp oral swab however unable to follow through to clean out mouth. Sit><stand transfer completed with max A x2 from EOB and brief doffed with total A in standing.  Pt tolerates approx 30 seconds in standing before initiating transfer back onto bedside. Sit>supine completed with mod Ax2 and pt left resting comfortably with call bell/needs in reach, bed alarm set at end of session. Pt would benefit from continued skilled OT services during hospital stay and at next level of care to address current impairments and improve overall IND and safety with self cares and functional mobility upon d/c. OT recommending IRF vs SNF at d/c once medically appropriate, pending overall progress and verification of prior level of function. Other factors to consider for discharge: Family support, DME, time since onset, severity of deficits     Patient will benefit from skilled therapy intervention to address the above noted impairments. PLAN :  Recommendations and Planned Interventions: self care training, functional mobility training, therapeutic exercise, balance training, therapeutic activities, cognitive retraining, endurance activities, neuromuscular re-education, and patient education    Frequency/Duration: Patient will be followed by occupational therapy 5 times a week to address goals. Recommendation for discharge: (in order for the patient to meet his/her long term goals)  IRF vs. SNF pending progress and verification of PLOF    This discharge recommendation:  Has been made in collaboration with the attending provider and/or case management       SUBJECTIVE:   Patient stated Osman Allison I sit up?     OBJECTIVE DATA SUMMARY:   HISTORY:   Past Medical History:   Diagnosis Date    Breast cancer ([facility] [address]     Stroke (cerebrum) ([facility] [address] 2019   History reviewed. No pertinent surgical history. Expanded or extensive additional review of patient history:     Home Situation  Home Environment: Private residence  Living Alone: No    EXAMINATION OF PERFORMANCE DEFICITS:  Cognitive/Behavioral Status:  Neurologic State: Drowsy; Confused  Orientation Level: Oriented to place  Cognition: Decreased command following;Decreased attention/concentration    Hearing:   Appears WFL    Vision/Perceptual:     Appears grossly WFL, however difficult to assess at this time 2' to cognition      Range of Motion:  AROM: Generally decreased, functional(L UE limited)  PROM: Generally decreased, functional(L UE limited, increased tone throughout)   Limited sh flex/abd <90 degrees with increased tone noted throughout L UE. R UE appears grossly [facility] for ADL purposes. Strength:  Strength: Grossly decreased, non-functional(L UE 2-/5, R UE 3/5 grossly)    Coordination:  Coordination: Grossly decreased, non-functional(Unable to formally assess)  Fine Motor Skills-Upper: Left Impaired;Comment(R UE grossly decreased)    Gross Motor Skills-Upper: Comment(Grossly decreased bilaterally)    Tone & Sensation:  Tone: Abnormal(Hypertonic L UE)  Sensation: Intact     Balance:  Sitting: Impaired; With support  Sitting - Static: Fair (occasional)  Sitting - Dynamic: Poor (constant support)  Standing: Impaired; With support  Standing - Static: Poor  Standing - Dynamic : Poor    Functional Mobility and Transfers for ADLs:  Bed Mobility:  Rolling: Moderate assistance;Assist x2  Supine to Sit: Moderate assistance;Assist x2  Sit to Supine: Moderate assistance;Assist x2  Scooting: Moderate assistance;Assist x2    Transfers:  Sit to Stand: Maximum assistance;Assist x2  Stand to Sit: Maximum assistance;Assist x2    ADL Intervention and task modifications:    Upper Body [address] Gown: Maximum assistance    Lower Body Dressing Assistance  Socks: Total assistance (dependent)  Position Performed: Seated edge of bed    Toileting  Toileting Assistance: Total assistance(dependent)  Clothing Management: Total assistance (dependent)(to doff soiled brief)    Cognitive Retraining  Following Commands:  Follows one step commands/directions    Grooming:  Oral care: total Assistance with oral swab    Therapeutic Exercise:  Pt would benefit from UE HEP to improve overall UE AROM/strength and can be further educated in next treatment session. Functional Measure:    [address] [place] [id number] AM-PACTM \"6 Clicks\"                                                       Daily Activity Inpatient Short Form  How much help from another person does the patient currently need. .. Total; A Lot A Little None   1. Putting on and taking off regular lower body clothing? [x]  1 []  2 []  3 []  4   2. Bathing (including washing, rinsing, drying)? []  1 [x]  2 []  3 []  4   3. Toileting, which includes using toilet, bedpan or urinal? [x] 1 []  2 []  3 []  4   4. Putting on and taking off regular upper body clothing? []  1 [x]  2 []  3 []  4   5. Taking care of personal grooming such as brushing teeth? []  1 [x]  2 []  3 []  4   6. Eating meals? []  1 [x]  2 []  3 []  4   © 2007, Trustees of [address] [place] [id number], under license to [employer]. All rights reserved     Score: 10/24     Interpretation of Tool:  Represents clinically-significant functional categories (i.e. Activities of daily living). Percentage of Impairment CH    0%   CI    1-19% CJ    20-39% CK    40-59% CL    60-79% CM    80-99% CN     100%   AMPA  Score 6-24 24 23 20-22 15-19 10-14 7-9 6        Occupational Therapy Evaluation Charge Determination   History Examination Decision-Making   MEDIUM Complexity : Expanded review of history including physical, cognitive and psychosocial  history  MEDIUM Complexity : 3-5 performance deficits relating to physical, cognitive , or psychosocial skils that result in activity limitations and / or participation restrictions HIGH Complexity : Patient presents with comorbidities that affect occupational performance.  Signifigant modification of tasks or assistance (eg, physical or verbal) with assessment (s) is necessary to enable patient to complete evaluation       Based on the above components, the patient evaluation is determined to be of the following complexity level: MEDIUM  Pain Rating:  Pt reports back pain, however does not provide formal rating at this time    Activity Tolerance:   Fair    After treatment patient left in no apparent distress:    Supine in bed, Call bell within reach, Bed / chair alarm activated, and Side rails x 3    COMMUNICATION/EDUCATION:   The patients plan of care was discussed with: Physical therapist and Registered nurse. Patient/family agree to work toward stated goals and plan of care. This patients plan of care is appropriate for delegation to [facility].     OT/PT sessions occurred together for increased patient and clinician safety as pt requires A of 2 for mobility at this time    Thank you for this referral.  Leif Cole  Time Calculation: 33 mins   Problem: Self Care Deficits Care Plan (Adult)  Goal: *Acute Goals and Plan of Care (Insert Text)  Description: Pt will be SBA sup <> sit in prep for EOB ADLs  Pt will be SBA grooming sitting EOB  Pt will be min A LE dressing sitting EOB/long sit  Pt will be min A sit <>  prep for toileting LRAD  Pt will be min A toileting/toilet transfer/cloth mgmt LRAD  Pt will be SPV following UE HEP in prep for self care tasks      Outcome: Not Met

## 2020-12-03 NOTE — PROGRESS NOTES
Skin Assessment    Dual skin assessment completed by Holli Gillette RN and Awais Peralta RN. Patient skin intact with no areas of concern identified.

## 2020-12-03 NOTE — ED NOTES
TRANSFER - OUT REPORT:    Verbal report given to Dayron Nix on 4e, 405(name) on Marilin Villarreal  being transferred to 4e, 405(unit) for routine progression of care       Report consisted of patients Situation, Background, Assessment and   Recommendations(SBAR). Information from the following report(s) SBAR was reviewed with the receiving nurse. Lines:   Peripheral IV 12/02/20 Left;Posterior Hand (Active)       Peripheral IV 12/02/20 Anterior;Proximal;Right Forearm (Active)        Opportunity for questions and clarification was provided.       Patient transported with:   Monitor  Tech

## 2020-12-03 NOTE — PROGRESS NOTES
PHYSICAL THERAPY EVALUATION  Patient: Han Silverman (62 y.o. female)  Date: 12/3/2020  [facility]: [facility] [R56.9]        Precautions: falls, droplet plus (COVID pending)       ASSESSMENT  Based on the objective data described below, the patient presents with generalized weakness, impaired functional mobility, impaired amb, impaired balance, decreased activity tolerance, poor command following, confusion, and poor safety awareness. Pt was flipped upside down in bed with IV wrapped around, O2 off, saturated diaper, and no gown upon PT/OT entry. Pt A&O x self, place, and month but not year. Per pt report pt resides with \"sometimes\" with daughter. Per medical records pt was ambulatory at home PTA; h/o left sided CVA last year. Pt c/o pain and requesting to get, agreeable to evaluation. Pt required mod A x2 for bed mobility, mod A x2 supine <> sit, mod to max  Ax2 sit <> stand transfers x2 attempts unable to reach full standing position with gt belt, and RW. Unable to amb today. Pt demonstrates increased tone in left UE, poor AROM of bilateral UE and LE, difficulty following complex commands so unable to officially test sensation and coordination during evaluation. Pt did fair with session today with c/o pain and discomfort but unable to rate pain today. Pt will benefit from continued skilled PT to address above deficits and return to PLOF. Current PT DC recommendation IRF vs SNF pending progress and verification of PLOF.      Current Level of Function Impacting Discharge (mobility/balance): mod to max A x2    Other factors to consider for discharge: unconfirmed PLOF, h/o previous left sided CVA     PLAN :  Recommendations and Planned Interventions: bed mobility training, transfer training, gait training, therapeutic exercises, neuromuscular re-education, patient and family training/education and therapeutic activities      Frequency/Duration: Patient will be followed by physical therapy:  5 times a week to address goals. Recommendation for discharge: (in order for the patient to meet his/her long term goals)  IRF vs SNF    This discharge recommendation:  Has been made in collaboration with the attending provider and/or case management    IF patient discharges home will need the following DME: none         SUBJECTIVE:   Patient stated I want to get up.     OBJECTIVE DATA SUMMARY:   HISTORY:    Past Medical History:   Diagnosis Date    Breast cancer ([facility] [address]     Stroke (cerebrum) ([facility] [address] 2019   History reviewed. No pertinent surgical history. Home Situation  Home Environment: Private residence  Living Alone: No    EXAMINATION/PRESENTATION/DECISION MAKING:   Critical Behavior:  Neurologic State: Drowsy, Confused  Orientation Level: Oriented to place  Cognition: Decreased command following, Decreased attention/concentration     Hearing:     Skin:  Intact where visible   Edema: none noted; watch on left UE removed due to digging into skin; placed in room closet with clothes   Range Of Motion:  AROM: Generally decreased, functional(left UE limited left LE limited but functional )                       Strength:    Strength: Grossly decreased, non-functional(LE grossly 3-/5; left UE 2-/5, right UE 3-/5)                    Tone & Sensation:   Tone: Abnormal(increased tone noted in left UE)                              Coordination:  Coordination: Grossly decreased, non-functional  Vision:      Functional Mobility:  Bed Mobility:  Rolling: Moderate assistance;Assist x2  Supine to Sit: Moderate assistance;Assist x2  Sit to Supine: Moderate assistance;Assist x2  Scooting: Moderate assistance;Assist x2  Transfers:  Sit to Stand: Maximum assistance;Assist x2  Stand to Sit: Maximum assistance;Assist x2                       Balance:   Sitting: Impaired; With support  Sitting - Static: Fair (occasional)  Sitting - Dynamic: Poor (constant support)  Standing: Impaired; With support  Standing - Static: Poor  Standing - Dynamic : Poor  Ambulation/Gait Training:   unable to perform this session    Therapeutic Exercises:   Not completed this session     Functional Measure:    [facility] Mobility Inpatient Short Form  How much difficulty does the patient currently have. .. Unable A Lot A Little None   1. Turning over in bed (including adjusting bedclothes, sheets and blankets)? [] 1   [x] 2   [] 3   [] 4   2. Sitting down on and standing up from a chair with arms ( e.g., wheelchair, bedside commode, etc.)   [] 1   [x] 2   [] 3   [] 4   3. Moving from lying on back to sitting on the side of the bed? [] 1   [x] 2   [] 3   [] 4          How much help from another person does the patient currently need. .. Total A Lot A Little None   4. Moving to and from a bed to a chair (including a wheelchair)? [x] 1   [] 2   [] 3   [] 4   5. Need to walk in hospital room? [x] 1   [] 2   [] 3   [] 4   6. Climbing 3-5 steps with a railing? [x] 1   [] 2   [] 3   [] 4   © 2007, Trustees of [address] [place] [address] [address] [id number], under license to [employer]. All rights reserved     Score:  Initial: 9/24 Most Recent: X (Date: 12/3/2020 )   Interpretation of Tool:  Represents activities that are increasingly more difficult (i.e. Bed mobility, Transfers, Gait).   Score 24 23 22-20 19-15 14-10 9-7 6   Modifier CH CI CJ CK CL CM CN         Physical Therapy Evaluation Charge Determination   History Examination Presentation Decision-Making   HIGH Complexity :3+ comorbidities / personal factors will impact the outcome/ POC  HIGH Complexity : 4+ Standardized tests and measures addressing body structure, function, activity limitation and / or participation in recreation  LOW Complexity : Stable, uncomplicated  Other outcome measures [facility] 6  high      Based on the above components, the patient evaluation is determined to be of the following complexity level: LOW     Pain Rating:  Reports back pain but not able to rate on scale    Activity Tolerance:   Fair and requires rest breaks    After treatment patient left in no apparent distress:   Supine in bed, Call bell within reach, Bed / chair alarm activated and Side rails x 3 and nsg updated. GOALS:    Problem: Mobility Impaired (Adult and Pediatric)  Goal: *Acute Goals and Plan of Care (Insert Text)  Description: Pt will be I with LE HEP in 7 days. Pt will perform bed mobility with mod A x1 in 7 days. Pt will perform transfers with mod A x1 in 7 days. Pt will amb 10-25 feet with LRAD safely with mod A x1 in 7 days. Outcome: Not Met       COMMUNICATION/EDUCATION:   The patients plan of care was discussed with: Occupational therapist and Registered nurse. Patient is unable to participate in goal setting and plan of care. PT/OT sessions occurred together for increased safety of pt and clinician.     Thank you for this referral.  Cara Quarles, PT, DPT   Time Calculation: 33 mins

## 2020-12-03 NOTE — PROGRESS NOTES
Problem: Falls - Risk of  Goal: *Absence of Falls  Description: Document Nirmal Palmer Fall Risk and appropriate interventions in the flowsheet. Outcome: Progressing Towards Goal  Note: Fall Risk Interventions:  Mobility Interventions: Patient to call before getting OOB, PT Consult for mobility concerns, PT Consult for assist device competence    Mentation Interventions: Bed/chair exit alarm, Door open when patient unattended, More frequent rounding, Reorient patient    Medication Interventions: Evaluate medications/consider consulting pharmacy, Patient to call before getting OOB, Teach patient to arise slowly    Elimination Interventions: Call light in reach, Patient to call for help with toileting needs    History of Falls Interventions: Room close to nurse's station, Assess for delayed presentation/identification of injury for 48 hrs (comment for end date)         Problem: Patient Education: Go to Patient Education Activity  Goal: Patient/Family Education  Outcome: Progressing Towards Goal     Problem: Pressure Injury - Risk of  Goal: *Prevention of pressure injury  Description: Document Morgan Scale and appropriate interventions in the flowsheet.   Outcome: Progressing Towards Goal  Note: Pressure Injury Interventions:       Moisture Interventions: Limit adult briefs, Maintain skin hydration (lotion/cream), Minimize layers, Moisture barrier    Activity Interventions: Increase time out of bed, Pressure redistribution bed/mattress(bed type), PT/OT evaluation    Mobility Interventions: HOB 30 degrees or less, Pressure redistribution bed/mattress (bed type), PT/OT evaluation    Nutrition Interventions: Document food/fluid/supplement intake                     Problem: Patient Education: Go to Patient Education Activity  Goal: Patient/Family Education  Outcome: Progressing Towards Goal

## 2020-12-03 NOTE — PROGRESS NOTES
SPEECH LANGUAGE PATHOLOGY BEDSIDE SWALLOW EVALUATIONS  Patient: Nino Rajan  (89 y.o. )  Date: 12/3/2020  Primary Diagnosis:   Seizure  Precautions:  Droplet plus, fall, seizure, aspiration    ASSESSMENT :  Patient presents w/ moderate oropharyngeal dysphagia. Patient is lethargic difficult to keep aroused during evaluation. She is oriented x1, confused, and intermittently follows basic commands w/ max cues. L facial droop w/ difficulty opening L eye. Difficult to assess if patient presents w/ dysarthria vs reduced intelligibility s/t lethargy. Rec full speech language eval once more alert. Oral phase c/b reduced bolus awareness, reduced bolus acceptance, decreased bolus manipulation and slow A-P transit. Pharyngeal phase c/b mild swallow delay and HLE is wfl upon palpation. Delayed throat clear noted w/ thin trials. Patient will benefit from skilled intervention to address the above impairments. Patients rehabilitation potential is considered to be Fair     PLAN :  Recommendations and Planned Interventions:  Rec full NTL w/ strict asp/GERD precautions and crush meds. ONLY feed when alert and accepting. Frequency/Duration: Patient will be followed by speech-language pathology daily to address goals. Discharge Recommendations: SNF vs IRF pending progress     SUBJECTIVE:   Patient lethargic and limited verbalizations noted. OBJECTIVE:     Past Medical History:   Diagnosis Date    Bre[facility] ([facility] [address]     Stroke (cerebrum) ([facility] [address] 2019       CXR Results  (Last 48 hours)                 12/02/20 1513  XR CHEST SNGL V Final result    Narrative:  Chest single view. Comparison single view chest May 27, 2020       Hazy reticular markings more so through depended lungs. Suspect combination mild   interstitial edema superimposed upon chronic change. Persistent or recurrent   small to moderate volume dependent left pleural effusion.  Cardiac and   mediastinal structures unchanged noting thoracic aorta atherosclerosis. No   pneumothorax. Diet prior to admission: unknown  Current Diet:  DIET CARDIAC     Cognitive and Communication Status:  Neurologic State: Confused, Drowsy  Orientation Level: Oriented to person  Cognition: Decreased attention/concentration, Decreased command following, Impaired decision making           Swallowing Evaluation:   Oral Assessment:  Oral Assessment  Labial: Decreased rate;Decreased seal;Left droop  Dentition: Natural  Lingual: Decreased rate;Decreased strength  Velum: Unable to visualize  Mandible: No impairment  P.O. Trials:  Patient Position: upright in bed  Vocal quality prior to P.O.: Hoarse;Low volume  Consistency Presented: Nectar thick liquid;Pudding; Thin liquid  How Presented: SLP-fed/presented;Cup/sip;Spoon     Bolus Acceptance: Impaired  Bolus Formation/Control: Impaired  Type of Impairment: Delayed;Drooling;Premature spillage;Lip closure;Spillage  Propulsion: Delayed (# of seconds); Discoordination  Oral Residue: Less than 10% of bolus  Initiation of Swallow: Delayed (# of seconds)  Laryngeal Elevation: Functional  Aspiration Signs/Symptoms: Clear throat                Oral Phase Severity: Moderate  Pharyngeal Phase Severity : Mild-moderate  Voice:                 Vocal Quality: Hoarse;Low volume                               Pain:               After treatment:   Patient left in no apparent distress in bed, Call bell within reach, and Nursing notified    COMMUNICATION/EDUCATION:   Patient's confusion and alertness negatively impact swallow fxn. The patient's plan of care including recommendations, planned interventions, and recommended diet changes were discussed with: Registered nurse and Physician. Patient is unable to participate in goal setting and plan of care.     Problem: Dysphagia (Adult)  Goal: *Acute Goals and Plan of Care (Insert Text)  Description: Speech Therapy Swallow Goals  Initiated 12/3/2020  -Patient will tolerate full liquid diet with nectar thick liquids without clinical indicators of aspiration given moderate cues within 7 day(s). [ ] Not met  [ ]  MET   [ ] Progressing  [ ] Noemí Caro  -Patient will tolerate PO trials without clinical indicators of aspiration given minimal cues within 7 day(s). [ ] Not met  [ ]  MET   [ ] Progressing  [ ] Noemí Caro  -Patient will participate in modified barium swallow study within 7  day(s). [ ] Not met  [ ]  MET   [ ] Progressing  [ ] Noemí Caro  -Patient will demonstrate understanding of swallow safety precautions and aspiration precautions, diet recs with min cues within 7 day(s).         [ ] Not met  [ ]  MET   [ ] Nick Villalobos  [ ] Discontinue      Outcome: Initial   Thank you for this referral.  Sita Arvizu M.S., M.Ed., CCC-SLP  Time Calculation: 12 mins

## 2020-12-03 NOTE — PROGRESS NOTES
Reason for Admission:   Seizure                   RUR Score:  8% Low                   Plan for utilizing home health:   Prior [facility] in the past, and currently not open w/any [facility] agency. Desires HH at time of discharge. PCP: First and Last name:    Lorenzo Hebert MD     Name of Practice:    Are you a current patient: Yes/No: Yes   Approximate date of last visit: 4-5 MOS   Can you participate in a virtual visit with your PCP: Yes                    Current Advanced Directive/Advance Care Plan:  FULL Code. Listed emergency contact Lizy (granddadrea) voiced she's POA. \"I have to see if the bank has another copy, the original got messed up. My mom is her only child and she isn't involved. \"  SW acknowledged understanding. Writer informed [facility] if she isn't able to get POA paperwork she can try to obtain again otherwise, by law all decisions go to her mother. Granddaughter acknowledged understanding. Transition of Care Plan:                      Lives in her own home and a family friend lives there 24/7. Last seen PCP approx 4-5 months ago. [address] ([address]). Patient requires assistance w/ADL's & IADL's. Patient doesn't drive and relies on family for all transportation needs. No home O2. DME includes cane. Prior HH in the past.  Desires HH at time of discharge. Gave verbal consent for writer to send referrals out on the patient's behalf. No prior SNF or IRF in the past.      Granddaughter ([facility]) reports she's POA. Has to try and locate POA at the bank. If unable to get a copy from the bank will work on obtaining POA again. Patient has one daughter and per [employer] \"my mother isn't involved. \"  Explained if she's not able to obtain POA paperwork, legally all decisions will go to her mother. SW to follow up w/her re: POA paperwork. Care Management Interventions  PCP Verified by CM: Yes(4-5 MOS)  Mode of Transport at Discharge:  Other (see comment)(Transport)  Transition of Care Consult (CM Consult): Discharge Planning  Discharge Durable Medical Equipment: (No home O2.   DME (cane))  Current Support Network: Own Home(Ranch style home w/two steps to enter)  Confirm Follow Up Transport: Other (see comment)(Transport)  The Patient and/or Patient Representative was Provided with a Choice of Provider and Agrees with the Discharge Plan?: Yes  Discharge Location  Discharge Placement: Home with home health        LAKIA Esteban  U01

## 2020-12-03 NOTE — PROGRESS NOTES
Progress note    Subjective:   Chief Complaint : seizure-like activity                                Worsening left sided weakness prior to arrival.   Source of information : EMS, patients grand-daughter. granddaughter mentioned she isn't aware of her meds taken at home, but denies any h/o seizures or has a residual left sided weakness from stroke in 2019   History of present illness:     89F, h.o breast cancer and stroke (without residual weakness) with seizure like activity and left sided weakness prior to arrival.     Being treated for seizures with possible pneumonia. Rule out pneumonia. PLAN: continue azithromycin, r/o covid, keppra 500mg BID.     talking to the grand daughter, at baseline she is ambulatory with walker. Also has a left sided weakness from stroke in 2019  Past Medical History:   Diagnosis Date    Breast cancer ([facility]     Stroke (cerebrum) ([facility] 2019     History reviewed. No pertinent surgical history. History reviewed. No pertinent family history. Social History     Tobacco Use    Smoking status: Not on file   Substance Use Topics    Alcohol use: Not on file       Prior to Admission medications    Not on File     No Known Allergies          Review of Systems:  Unable to perform ROS: Mental status change     Vitals:     Visit Vitals  BP (!) 185/100   Pulse 93   Temp 96.8 °F (36 °C)   Resp 20   Ht 5' 7\" (1.702 m)   Wt 113.4 kg (250 lb)   SpO2 98%   BMI 39.16 kg/m²       Physical Exam:   Constitutional: Ill appearing. Lethargic. Head: Normocephalic and atraumatic. Mouth/Throat: Airway patent. Moist mucous membranes. Eyes: EOMI. No scleral icterus. Neck: Neck supple. Cardiovascular: Tachy rate, regular rhythm. Normal heart sounds. No murmur, rub, or gallop. Good pulses throughout. Pulmonary/Chest: No respiratory distress. Clear to auscultation bilaterally. No wheezes, rales, or rhonchi. Abdominal/GI: BS normal, Soft, non-tender, non-distended.  No rebound or guarding. Musculoskeletal: No gross injuries or deformities. Neurological: Awake and oriented to self and place. LUE and LLE rhythmic tremors. Not following commands on left side, following commands to move both RUE and RLE. Left facial droop. Psych: Unable to assess. Skin: Skin is warm and dry. No rash noted. Data Review:   Recent Results (from the past 24 hour(s))   GLUCOSE, POC    Collection Time: 12/02/20 11:12 AM   Result Value Ref Range    Glucose (POC) 170 (H) 65 - 100 mg/dL    Performed by Lexx Bush    CBC WITH AUTOMATED DIFF    Collection Time: 12/02/20 11:30 AM   Result Value Ref Range    WBC 6.0 3.6 - 11.0 K/uL    RBC 4.10 3.80 - 5.20 M/uL    HGB 9.6 (L) 11.5 - 16.0 g/dL    HCT 32.1 (L) 35.0 - 47.0 %    MCV 78.3 (L) 80.0 - 99.0 FL    MCH 23.4 (L) 26.0 - 34.0 PG    MCHC 29.9 (L) 30.0 - 36.5 g/dL    RDW 16.5 (H) 11.5 - 14.5 %    PLATELET 484 543 - 760 K/uL    MPV 11.5 8.9 - 12.9 FL    NEUTROPHILS 77 (H) 32 - 75 %    LYMPHOCYTES 14 12 - 49 %    MONOCYTES 8 5 - 13 %    EOSINOPHILS 1 0 - 7 %    BASOPHILS 0 0 - 1 %    IMMATURE GRANULOCYTES 0 0.0 - 0.5 %    ABS. NEUTROPHILS 4.7 1.8 - 8.0 K/UL    ABS. LYMPHOCYTES 0.8 0.8 - 3.5 K/UL    ABS. MONOCYTES 0.5 0.0 - 1.0 K/UL    ABS. EOSINOPHILS 0.1 0.0 - 0.4 K/UL    ABS. BASOPHILS 0.0 0.0 - 0.1 K/UL    ABS. IMM.  GRANS. 0.0 0.00 - 0.04 K/UL    DF AUTOMATED     METABOLIC PANEL, COMPREHENSIVE    Collection Time: 12/02/20 11:30 AM   Result Value Ref Range    Sodium 144 136 - 145 mmol/L    Potassium 3.4 (L) 3.5 - 5.1 mmol/L    Chloride 110 (H) 97 - 108 mmol/L    CO2 26 21 - 32 mmol/L    Anion gap 8 5 - 15 mmol/L    Glucose 169 (H) 65 - 100 mg/dL    BUN 27 (H) 6 - 20 mg/dL    Creatinine 1.10 (H) 0.55 - 1.02 mg/dL    BUN/Creatinine ratio 25 (H) 12 - 20      GFR est AA 57 (L) >60 ml/min/1.73m2    GFR est non-AA 47 (L) >60 ml/min/1.73m2    Calcium 9.0 8.5 - 10.1 mg/dL    Bilirubin, total 0.4 0.2 - 1.0 mg/dL    AST (SGOT) 14 (L) 15 - 37 U/L    ALT (SGPT) 9 (L) 12 - 78 U/L    Alk. phosphatase 79 45 - 117 U/L    Protein, total 7.5 6.4 - 8.2 g/dL    Albumin 3.1 (L) 3.5 - 5.0 g/dL    Globulin 4.4 (H) 2.0 - 4.0 g/dL    A-G Ratio 0.7 (L) 1.1 - 2.2     PROTHROMBIN TIME + INR    Collection Time: 12/02/20 11:30 AM   Result Value Ref Range    Prothrombin time 13.7 11.9 - 14.7 sec    INR 1.0 0.9 - 1.1     LACTIC ACID    Collection Time: 12/02/20 11:30 AM   Result Value Ref Range    Lactic acid 2.8 (HH) 0.4 - 2.0 mmol/L   TYPE & SCREEN    Collection Time: 12/02/20 11:30 AM   Result Value Ref Range    Crossmatch Expiration 12/05/2020,2359     ABO/Rh(D) Romero Cosme Positive     Antibody screen Negative              Assessment and Plan :     (1) partial seizures Vs complex migraine : keppra 500mg BID. Seizures precaution. LA in AM.      (2) probable stroke: aspirin, statin, lipid panel, A1c, speech. MRI today. (3) HTN urgency: will order labetalol 10 Q4H PRN for BP> 620 systolic    (4) h/o CVA: complicates #1    (5) RENAN: s/t prerenal. LR bolus. Repeat in AM    (6) hypokalemia:     (7) pneumonia: azithromycin. R/o COVID. DVT ppx: heparin SubQ, pending home meds.      Signed By: Adele Pedersen MD     December 3, 2020

## 2020-12-03 NOTE — PROGRESS NOTES
NEUROLOGY  PROGRESS NOTE    Admission History/Pertinent Events  Patricia Ledbetter is a 80y.o. year old female who presented on 12/2/2020. Patient has a past medical history of Stroke with LHB weakness, Seizures, Breast Cancer who presented with seizure like activity. Per chart review, patient reportedly had seizure-like activity seen by her daughter. EMS arrived and found patient complaining of a left-sided headache and dizziness. On transport to [facility] patient had some left eye fluttering along with left side gaze deviation and left facial droop. By this time physicians in the ED evaluated patient she was quite lethargic. Initial blood pressures were elevated to 220/100s and elevated pulse to the 120s. Patient was loaded with IV levetiracetam 1 g. Code stroke was initiated and teleneurology recommended CTA head/neck. Patient was not deemed a candidate for TPA. Home AEDs: None      ASSESSMENT/PLAN      Impression  Patient's MRI brain without any acute/subacute ischemia noted. Patient does have remote right posterior parietal infarct which could be patient's irritative zone for decreased seizure threshold. We will continue patient on levetiracetam per below and defer management of likely underlying infectious process to referring team's. Patient will benefit from outpatient EEG. Patient with significantly elevated systolic blood pressures yesterday and throughout the morning. We recommend to slowly bring this down to avoid hypertensive encephalopathy and avoid risk of hypovolemic ischemia. Cannot completely rule out a degree of posterior reversible encephalopathy syndrome given significantly elevated blood pressures. Given patient's mentation is likely back to her baseline as she is fully alert and oriented without any focal deficits that are new on examination no further neurologic work-up will be necessary at the current time.        [facility] WO  [facility]  Remote but interval development of right parietooccipital infarct compared to [facility] from 12/2019     CTA Head/Neck  High-grade stenosis in the right M1  Moderate grade stenosis in the left M2  25 to 30% atherosclerotic disease in the right carotid bifurcation  Patchy groundglass opacities in the right upper lobe likely signifying pneumonia    MRI Brain WO  [facility]  Remote right posterior parietal infarct  Generalized cerebral atrophy  [facility]    Lab Studies  -Positive: LA 2.8      Plan      Breakthrough Seizure  Associated: Likely PNA, HTN Emergency vs PRES  -Q4Hr NeuroChecks, TELE  -Stroke Prophylaxis: , Atorvastatin 40  -SBP Goal < 160  --> Do not drop systolic blood pressure by more than 25% every 4-6 hours which can lead to hypovolemia and watershed ischemia  -Seizure Precautions  -Continue levetiracetam 500 BID  -STAT IV Lorazepam 2 mg with any clinical seizure activity lasting > 3 minutes and contact Neurology for further recommendations  -Plan for outpatient EEG  -Management of infectious and metabolic derangements to referring teams    No further inpatient neurologic work-up is necessary at the current time. Patient to follow-up with neurology in 2 weeks from discharge. SUBJECTIVE   Patient with blood pressures in the 215/120 yesterday during the day per chart review. Patient's blood pressures elevated overnight in the morning with systolics up to the 278O. Patient on azithromycin for likely PNA. Patient fully alert and oriented this morning following central peripheral commands and knowing patient's current situation. Patient has no complaints this morning.       Physical/Neurological Exam  General: Elderly -American female     Patient is awake and alert and following central peripheral commands  Patient is oriented to person, place, time and situation  No appreciable expressive or receptive aphasia; mild dysarthria  Pupils react to light bilaterally; EOM Intact   Blink to threat intact bilaterally  No facial droop   Motor              LUE: 2/5  LLE: 4/5              RHB: 4+/5  No abnormal movements   Sensation to pain intact throughout      OBJECTIVE  Vital Signs  Temp:  [96.8 °F (36 °C)-98.6 °F (37 °C)]   Pulse (Heart Rate):  []   BP: (112-218)/()   Resp Rate:  [15-20]   O2 Sat (%):  [94 %-100 %]   Weight:  [113.4 kg (250 lb)]     MEDICATIONS    Current Facility-Administered Medications:     [START ON 12/4/2020] aspirin tablet 325 mg, 325 mg, Oral, DAILY, Miquel Smart MD    azithromycin (ZITHROMAX) 500 mg in 0.9% sodium chloride 250 mL (VIAL-MATE), 500 mg, IntraVENous, Q24H, Marc Eaton MD, 500 mg at 12/03/20 0821    levETIRAcetam (KEPPRA) 500 mg in saline (iso-osm) 100 mL IVPB (premix), 500 mg, IntraVENous, Q12H, Marc Eaton MD, 500 mg at 12/03/20 1430    atorvastatin (LIPITOR) tablet 40 mg, 40 mg, Oral, DAILY, Angeli Gentile MD, 40 mg at 12/03/20 5186    labetaloL (NORMODYNE;TRANDATE) 20 mg/4 mL (5 mg/mL) injection 20 mg, 20 mg, IntraVENous, Q4H PRN, Angeli Gentile MD, 20 mg at 12/03/20 5418      Labs: I've reviewed the labs for today     This document has been prepared by the Dragon voice recognition system, typographical errors may have occurred.  Attempts have been made to correct errors, however inadvertent errors may persist.

## 2020-12-04 LAB
ANION GAP SERPL CALC-SCNC: 10 MMOL/L (ref 5–15)
ATRIAL RATE: 111 BPM
BUN SERPL-MCNC: 21 MG/DL (ref 6–20)
BUN/CREAT SERPL: 25 (ref 12–20)
CA-I BLD-MCNC: 9.2 MG/DL (ref 8.5–10.1)
CALCULATED P AXIS, ECG09: 64 DEGREES
CALCULATED R AXIS, ECG10: -28 DEGREES
CALCULATED T AXIS, ECG11: 58 DEGREES
CHLORIDE SERPL-SCNC: 109 MMOL/L (ref 97–108)
CO2 SERPL-SCNC: 26 MMOL/L (ref 21–32)
CREAT SERPL-MCNC: 0.84 MG/DL (ref 0.55–1.02)
DIAGNOSIS, 93000: NORMAL
ERYTHROCYTE [DISTWIDTH] IN BLOOD BY AUTOMATED COUNT: 16.6 % (ref 11.5–14.5)
GLUCOSE SERPL-MCNC: 88 MG/DL (ref 65–100)
HCT VFR BLD AUTO: 29.6 % (ref 35–47)
HGB BLD-MCNC: 9.1 G/DL (ref 11.5–16)
LACTATE SERPL-SCNC: 0.9 MMOL/L (ref 0.4–2)
MCH RBC QN AUTO: 23.8 PG (ref 26–34)
MCHC RBC AUTO-ENTMCNC: 30.7 G/DL (ref 30–36.5)
MCV RBC AUTO: 77.5 FL (ref 80–99)
NRBC # BLD: 0 K/UL (ref 0–0.01)
NRBC BLD-RTO: 0 PER 100 WBC
P-R INTERVAL, ECG05: 158 MS
PLATELET # BLD AUTO: 188 K/UL (ref 150–400)
PMV BLD AUTO: 11.5 FL (ref 8.9–12.9)
POTASSIUM SERPL-SCNC: 3.1 MMOL/L (ref 3.5–5.1)
Q-T INTERVAL, ECG07: 356 MS
QRS DURATION, ECG06: 76 MS
QTC CALCULATION (BEZET), ECG08: 484 MS
RBC # BLD AUTO: 3.82 M/UL (ref 3.8–5.2)
SARS-COV-2, COV2: NORMAL
SODIUM SERPL-SCNC: 145 MMOL/L (ref 136–145)
VENTRICULAR RATE, ECG03: 111 BPM
WBC # BLD AUTO: 8.2 K/UL (ref 3.6–11)

## 2020-12-04 PROCEDURE — 74011250637 HC RX REV CODE- 250/637: Performed by: STUDENT IN AN ORGANIZED HEALTH CARE EDUCATION/TRAINING PROGRAM

## 2020-12-04 PROCEDURE — 92526 ORAL FUNCTION THERAPY: CPT

## 2020-12-04 PROCEDURE — 83605 ASSAY OF LACTIC ACID: CPT

## 2020-12-04 PROCEDURE — 80048 BASIC METABOLIC PNL TOTAL CA: CPT

## 2020-12-04 PROCEDURE — 97530 THERAPEUTIC ACTIVITIES: CPT

## 2020-12-04 PROCEDURE — 65270000029 HC RM PRIVATE

## 2020-12-04 PROCEDURE — 74011250636 HC RX REV CODE- 250/636: Performed by: HOSPITALIST

## 2020-12-04 PROCEDURE — 94762 N-INVAS EAR/PLS OXIMTRY CONT: CPT

## 2020-12-04 PROCEDURE — 85027 COMPLETE CBC AUTOMATED: CPT

## 2020-12-04 PROCEDURE — 87635 SARS-COV-2 COVID-19 AMP PRB: CPT

## 2020-12-04 PROCEDURE — 74011250637 HC RX REV CODE- 250/637: Performed by: HOSPITALIST

## 2020-12-04 RX ADMIN — AZITHROMYCIN MONOHYDRATE 500 MG: 500 INJECTION, POWDER, LYOPHILIZED, FOR SOLUTION INTRAVENOUS at 08:40

## 2020-12-04 RX ADMIN — LISINOPRIL 10 MG: 10 TABLET ORAL at 08:38

## 2020-12-04 RX ADMIN — AMLODIPINE BESYLATE 10 MG: 5 TABLET ORAL at 08:38

## 2020-12-04 RX ADMIN — LEVETIRACETAM 500 MG: 5 INJECTION INTRAVENOUS at 03:13

## 2020-12-04 RX ADMIN — LABETALOL HYDROCHLORIDE 20 MG: 5 INJECTION, SOLUTION INTRAVENOUS at 08:21

## 2020-12-04 RX ADMIN — CARVEDILOL 12.5 MG: 12.5 TABLET, FILM COATED ORAL at 16:02

## 2020-12-04 RX ADMIN — LEVETIRACETAM 500 MG: 5 INJECTION INTRAVENOUS at 15:47

## 2020-12-04 RX ADMIN — LABETALOL HYDROCHLORIDE 20 MG: 5 INJECTION, SOLUTION INTRAVENOUS at 15:52

## 2020-12-04 RX ADMIN — CARVEDILOL 12.5 MG: 12.5 TABLET, FILM COATED ORAL at 08:37

## 2020-12-04 RX ADMIN — ASPIRIN 325 MG ORAL TABLET 325 MG: 325 PILL ORAL at 08:39

## 2020-12-04 RX ADMIN — ATORVASTATIN CALCIUM 40 MG: 40 TABLET, FILM COATED ORAL at 08:39

## 2020-12-04 NOTE — PROGRESS NOTES
Enquiry made from lab at this time via phone indicates that Covid 19 lab order on 12/02/20 was not received by lab. Will follow up with attending if another one has to be taken  Provider Communication at [address]  Provider paged and wants another sample taken.

## 2020-12-04 NOTE — PROGRESS NOTES
PHYSICAL THERAPY TREATMENT  Patient: Lay Proctor (5 y.o. female)  Date: 12/4/2020  Diagnosis: [facility] [R56.9]   <principal problem not specified>       Precautions:    Chart, physical therapy assessment, plan of care and goals were reviewed. ASSESSMENT  Patient continues with skilled PT services and is slowly progressing towards goals. Pt semi supine in bed upon PT/OT arrival and agreeable to session. Patient was drowsy throughout session, and towards end of session patient having difficulty staying awake. Performed sup>sit with max A x 2. During sup>sit transfer patient demo'd heavy posterior lean which she would not correct with VC and when therapist tried to correct patient resistive at first. Patient sat EOB ~10 minutes with Max Assist for dynamic and static sitting balance. Performed 1 x 10 LAQ with AAROM, see details below. Patient A & O to self only. Recommending d/c to IRF once medically appropriate. Current Level of Function Impacting Discharge (mobility/balance): level            PLAN :  Patient continues to benefit from skilled intervention to address the above impairments. Continue treatment per established plan of care. to address goals. Recommendation for discharge: (in order for the patient to meet his/her long term goals)  Therapy 3 hours per day 5-7 days per week    This discharge recommendation:  Has been made in collaboration with the attending provider and/or case management    IF patient discharges home will need the following DME: to be determined (TBD)       SUBJECTIVE:   Patient stated I don't know what month it is    OBJECTIVE DATA SUMMARY:   Critical Behavior:  Neurologic State: Drowsy  Orientation Level: Oriented to person  Cognition: Decreased attention/concentration     Functional Mobility Training:  Bed Mobility:  Rolling: Maximum assistance;Assist x2  Supine to Sit: Maximum assistance;Assist x2  Sit to Supine:  Total Assistance x 2   Scooting: Maximum assistance;Assist x2        Transfers:  STS not attempted due to poor sitting balance. Balance:  Sitting: Impaired; With support  Sitting - Static: Poor (constant support)  Sitting - Dynamic: Poor (constant support)      Therapeutic Exercises:   1 x 10 LAQ while sitting on EOB. Required AAROM. Max assist for dyamic sitting balance. Pain Ratin/10    Activity Tolerance:   Poor  Please refer to the flowsheet for vital signs taken during this treatment. After treatment patient left in no apparent distress:   Supine in bed, Call bell within reach, Bed / chair alarm activated, and Side rails x 3    COMMUNICATION/COLLABORATION:   The patients plan of care was discussed with: Occupational therapist.     OT/PT sessions occurred together for increased patient and clinician safety      Problem: Mobility Impaired (Adult and Pediatric)  Goal: *Acute Goals and Plan of Care (Insert Text)  Description: Pt will be I with LE HEP in 7 days. Pt will perform bed mobility with mod A x1 in 7 days. Pt will perform transfers with mod A x1 in 7 days. Pt will amb 10-25 feet with LRAD safely with mod A x1 in 7 days.          Outcome: Progressing Towards Goal       Flakito Chiu PTA   Time Calculation: 20 mins

## 2020-12-04 NOTE — PROGRESS NOTES
13: 15PM Referral sent to [facility]. Giacomo Mccullough, LAKIA        THERESA spoke w/listed emergency contact [facility] (grand daughter). THERESA informed her d/c recommendation is IRF vs SNF. Emergency contact gave verbal consent for writer to send a referral to [facility] for inpatient rehab. Referral to be sent via [address]. Patient will require auth.        Giacomo Mccullough, LAKIA

## 2020-12-04 NOTE — PROGRESS NOTES
Problem: Self Care Deficits Care Plan (Adult)  Goal: *Acute Goals and Plan of Care (Insert Text)  Description: Pt will be SBA sup <> sit in prep for EOB ADLs  Pt will be SBA grooming sitting EOB  Pt will be min A LE dressing sitting EOB/long sit  Pt will be min A sit <>  prep for toileting LRAD  Pt will be min A toileting/toilet transfer/cloth mgmt LRAD  Pt will be SPV following UE HEP in prep for self care tasks      Outcome: Progressing Towards Goal     OCCUPATIONAL THERAPY TREATMENT  Patient: Phillip Barron (32 y.o. female)  Date: 12/4/2020  Diagnosis: Seizure ([facility] [R56.9]   <principal problem not specified>       Precautions:  Falls, elevated LUE  Chart, occupational therapy assessment, plan of care, and goals were reviewed. ASSESSMENT  Patient continues with skilled OT services and is progressing towards goals. Pt received semi supine in bed and agreeable for OT/PT tx however very confused and requiring max cueing to participate with therapy as pt drowsy. Pt  requiring max Ax2 sup->sit and scooting EOB and to maintain balance EOB as pt initially demonstrating heavy posterior lean/extensor tone however able to bend at hips with max Ax2. Pt working on EOB sitting balance in prep for EOB self care tasks and is max A/total A simple grooming EOB simulated. Pt extremely drowsy and returned sit->supine with total Ax2 and repositioned with LUE on pillow. Pt would benefit from skilled OT services while at [facility] in order to increase safety and independence with self care and functional transfers/mobility. Recommend discharge to IRF when medically appropriate as per CM note pt was walking with walker prior to admission. Other factors to consider for discharge: time since onset, severity of deficits         PLAN :  Patient continues to benefit from skilled intervention to address the above impairments. Continue treatment per established plan of care. to address goals.     Recommend with staff: bed level only    Recommend next OT session: 2 person assist    Recommendation for discharge: (in order for the patient to meet his/her long term goals)  IRF    This discharge recommendation:  Has been made in collaboration with the attending provider and/or case management    IF patient discharges home will need the following DME: TBD       SUBJECTIVE:   Patient stated yes when asked if pt is tired    OBJECTIVE DATA SUMMARY:   Cognitive/Behavioral Status:  Neurologic State: Drowsy  Orientation Level: Oriented to person  Cognition: Decreased attention/concentration        Functional Mobility and Transfers for ADLs:  Bed Mobility:  Rolling: Maximum assistance;Assist x2  Supine to Sit: Maximum assistance;Assist x2  Sit to Supine: Total assistance;Assist x2  Scooting: Maximum assistance;Assist x2    Transfers:     Balance:  Sitting: Impaired; With support  Sitting - Static: Poor (constant support)  Sitting - Dynamic: Poor (constant support)    ADL Intervention:     Grooming  Grooming Assistance: Maximum assistance; Total assistance(dependent)(simulated)  Position Performed: Seated edge of bed    Pain:  No pain reported    Activity Tolerance:   Poor and requires rest breaks  Please refer to the flowsheet for vital signs taken during this treatment. After treatment patient left in no apparent distress:   Supine in bed, Call bell within reach, Bed / chair alarm activated, and Side rails x 3    COMMUNICATION/COLLABORATION:   The patients plan of care was discussed with: Physical therapy assistant and Case management. PT/OT sessions occurred together for increased safety of pt and clinicianCallie Bailey  Time Calculation: 20 mins

## 2020-12-04 NOTE — ROUTINE PROCESS
.Bedside and Verbal shift change report given to Emmanuel Castro RN (oncoming nurse) by Nick Carranza (offgoing nurse). Report included the following information SBAR, Kardex, MAR, Accordion and Cardiac Rhythm Sinus Tach. Pascale Rodriguez

## 2020-12-04 NOTE — PROGRESS NOTES
BEDSIDE SWALLOW TREATMENT  Patient: Ander Duverney (76 y.o. female)  Date: 12/4/2020  Diagnosis: Seizure ([facility] [address] [R56.9]          Precautions:  SEIZURE, FALL, ASPIRATION    ASSESSMENT :  Patient presents w/ mild oropharyngeal dysphagia. Patient is more alert but drowsy, oriented x3, and follows basic commands. L facial droop resolved. MRI results noted. Oral phase c/b reduced bolus manipulation and slow A-P transit. Pharyngeal phase c/b mild swallow delay and HLE is wfl upon palpation. Overt s/s of pen/asp c/b coughing w/ thin. Patient will benefit from skilled intervention to address the above impairments. Patients rehabilitation potential is considered to be Good      PLAN :  Recommendations and Planned Interventions:  Rec full NTL w/ strict asp/GERD precautions and crush meds. ONLY feed when alert and accepting. Upgrade once more alert. Frequency/Duration: Patient will be followed by speech-language pathology daily to address goals. Discharge Recommendations: SNF vs IRF pending progress     SUBJECTIVE:   Patient reports she is uncomfortable. 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 Comparison single view chest May 27, 2020       Hazy reticular markings more so through depended lungs. Suspect combination mild   interstitial edema superimposed upon chronic change. Persistent or recurrent   small to moderate volume dependent left pleural effusion. Cardiac and   mediastinal structures unchanged noting thoracic aorta atherosclerosis. No   pneumothorax.                Cognitive and Communication Status:  Neurologic State: Drowsy  Orientation Level: Oriented to person, Oriented to place, Oriented to time  Cognition: Decreased attention/concentration, Decreased command following           Dysphagia Treatment:  Oral Assessment:     P.O. Trials:  Patient Position: upright in bed  Vocal quality prior to P.O.: Hoarse  Consistency Presented: Nectar thick liquid;Puree; Thin liquid  How Presented: SLP-fed/presented;Cup/sip;Spoon     Bolus Acceptance: No impairment  Bolus Formation/Control: Impaired  Type of Impairment: Delayed  Propulsion: Delayed (# of seconds)  Oral Residue: Less than 10% of bolus  Initiation of Swallow: Delayed (# of seconds)  Laryngeal Elevation: Functional  Aspiration Signs/Symptoms: Weak cough                Oral Phase Severity: Minimal  Pharyngeal Phase Severity : Mild    Pain:           After treatment:   Patient left in no apparent distress in bed, Call bell within reach, and Nursing notified     COMMUNICATION/EDUCATION:   Patient's confusion and alertness negatively impact swallow fxn. The patient's plan of care including recommendations, planned interventions, and recommended diet changes were discussed with: Registered nurse and Physician. Thank you for this referral.  Amy Santos M.S., M.Ed., CCC-SLP  Time Calculation: 16 mins    Problem: Dysphagia (Adult)  Goal: *Acute Goals and Plan of Care (Insert Text)  Description: Speech Therapy Swallow Goals  Initiated 12/3/2020  -Patient will tolerate full liquid diet with nectar thick liquids without clinical indicators of aspiration given moderate cues within 7 day(s). [ ] Not met  [ ]  MET   [ ] Progressing  [ ] Rosangela Cindy  -Patient will tolerate PO trials without clinical indicators of aspiration given minimal cues within 7 day(s). [ ] Not met  [ ]  MET   [ ] Progressing  [ ] Rosangela Cindy  -Patient will participate in modified barium swallow study within 7  day(s). [ ] Not met  [ ]  MET   [ ] Progressing  [ ] Rosangela Cindy  -Patient will demonstrate understanding of swallow safety precautions and aspiration precautions, diet recs with min cues within 7 day(s). [ ] Not met  [ ]  MET   [ ] Progressing  [ ] Discontinue      Outcome: Progressing Towards Goal

## 2020-12-04 NOTE — PROGRESS NOTES
Progress note    Subjective:   Chief Complaint : seizure-like activity                                Worsening left sided weakness prior to arrival.   Source of information : EMS, patients grand-daughter. granddaughter mentioned she isn't aware of her meds taken at home, but denies any h/o seizures or has a residual left sided weakness from stroke in 2019   History of present illness:     89F, h.o breast cancer and stroke (without residual weakness) with seizure like activity and left sided weakness prior to arrival.     Being treated for seizures with possible pneumonia. Rule out pneumonia.     talking to the grand daughter, at baseline she is ambulatory with walker. Also has a left sided weakness from stroke in 2019    PLAN:  Continue azithromycin for 5 more days  KEPPRA 500mg BID   Likely IRF SNF, likely be medically stable by Monday or Tuesday. Past Medical History:   Diagnosis Date    Breast cancer ([facility] [address]     [facility]     History reviewed. No pertinent surgical history. History reviewed. No pertinent family history. Social History     Tobacco Use    Smoking status: Not on file   Substance Use Topics    Alcohol use: Not on file       Prior to Admission medications    Not on File     No Known Allergies          Review of Systems:  Unable to perform ROS: Mental status change     Vitals:     Visit Vitals  BP (!) 160/75 (BP 1 Location: Left arm, BP Patient Position: At rest)   Pulse 87   Temp 97.8 °F (36.6 °C)   Resp 18   Ht 5' 7\" (1.702 m)   Wt 113.4 kg (250 lb)   SpO2 99%   BMI 39.16 kg/m²       Physical Exam:   Constitutional: Ill appearing. Lethargic. Head: Normocephalic and atraumatic. Mouth/Throat: Airway patent. Moist mucous membranes. Eyes: EOMI. No scleral icterus. Neck: Neck supple. Cardiovascular: Tachy rate, regular rhythm. Normal heart sounds. No murmur, rub, or gallop. Good pulses throughout. Pulmonary/Chest: No respiratory distress.  Clear to auscultation bilaterally. No wheezes, rales, or rhonchi. Abdominal/GI: BS normal, Soft, non-tender, non-distended. No rebound or guarding. Musculoskeletal: No gross injuries or deformities. Neurological: Awake and oriented to self and place. LUE and LLE rhythmic tremors. Not following commands on left side, following commands to move both RUE and RLE. Left facial droop. Psych: Unable to assess. Skin: Skin is warm and dry. No rash noted. Data Review:   No results found for this or any previous visit (from the past 24 hour(s)). Assessment and Plan :     (1) partial seizures Vs complex migraine : keppra 500mg BID. Seizures precaution. LA in AM.      (2) no acute stroke but possibility posterior circulation ischemia cannot be rule out aspirin, statin, lipid panel, A1c, speech. MRI today. (3) HTN urgency: will order labetalol 10 Q4H PRN for BP> 055 systolic    (4) h/o CVA: complicates #1    (5) RENAN: s/t prerenal. LR bolus. Repeat in AM    (6) hypokalemia:     (7) pneumonia: azithromycin. R/o COVID. PLAN:  Continue azithromycin for 5 more days  KEPPRA 500mg BID   Likely IRF SNF, likely be medically stable by Monday or Tuesday. DVT ppx: heparin SubQ, pending home meds.      Signed By: Ronda Singh MD     December 4, 2020

## 2020-12-05 PROCEDURE — 74011250637 HC RX REV CODE- 250/637: Performed by: STUDENT IN AN ORGANIZED HEALTH CARE EDUCATION/TRAINING PROGRAM

## 2020-12-05 PROCEDURE — 74011250636 HC RX REV CODE- 250/636: Performed by: HOSPITALIST

## 2020-12-05 PROCEDURE — 92526 ORAL FUNCTION THERAPY: CPT

## 2020-12-05 PROCEDURE — 65270000029 HC RM PRIVATE

## 2020-12-05 PROCEDURE — 74011250637 HC RX REV CODE- 250/637: Performed by: HOSPITALIST

## 2020-12-05 RX ORDER — LOSARTAN POTASSIUM 50 MG/1
100 TABLET ORAL DAILY
Status: DISCONTINUED | OUTPATIENT
Start: 2020-12-05 | End: 2020-12-13

## 2020-12-05 RX ORDER — METOPROLOL TARTRATE 50 MG/1
50 TABLET ORAL 2 TIMES DAILY
Status: DISCONTINUED | OUTPATIENT
Start: 2020-12-05 | End: 2020-12-06

## 2020-12-05 RX ORDER — ACETAMINOPHEN 325 MG/1
650 TABLET ORAL
Status: DISCONTINUED | OUTPATIENT
Start: 2020-12-05 | End: 2020-12-15 | Stop reason: HOSPADM

## 2020-12-05 RX ORDER — PANTOPRAZOLE SODIUM 40 MG/1
40 TABLET, DELAYED RELEASE ORAL
Status: DISCONTINUED | OUTPATIENT
Start: 2020-12-05 | End: 2020-12-15 | Stop reason: HOSPADM

## 2020-12-05 RX ORDER — POTASSIUM CHLORIDE 20 MEQ/1
40 TABLET, EXTENDED RELEASE ORAL
Status: DISPENSED | OUTPATIENT
Start: 2020-12-05 | End: 2020-12-05

## 2020-12-05 RX ORDER — MELOXICAM 7.5 MG/1
7.5 TABLET ORAL DAILY
Status: DISCONTINUED | OUTPATIENT
Start: 2020-12-06 | End: 2020-12-15 | Stop reason: HOSPADM

## 2020-12-05 RX ORDER — MECLIZINE HCL 12.5 MG 12.5 MG/1
12.5 TABLET ORAL
Status: DISCONTINUED | OUTPATIENT
Start: 2020-12-05 | End: 2020-12-14

## 2020-12-05 RX ORDER — TRAZODONE HYDROCHLORIDE 50 MG/1
50 TABLET ORAL
Status: DISCONTINUED | OUTPATIENT
Start: 2020-12-05 | End: 2020-12-15 | Stop reason: HOSPADM

## 2020-12-05 RX ADMIN — TRAZODONE HYDROCHLORIDE 50 MG: 50 TABLET ORAL at 22:02

## 2020-12-05 RX ADMIN — CARVEDILOL 12.5 MG: 12.5 TABLET, FILM COATED ORAL at 08:25

## 2020-12-05 RX ADMIN — LEVETIRACETAM 500 MG: 5 INJECTION INTRAVENOUS at 16:34

## 2020-12-05 RX ADMIN — LABETALOL HYDROCHLORIDE 20 MG: 5 INJECTION, SOLUTION INTRAVENOUS at 08:20

## 2020-12-05 RX ADMIN — ATORVASTATIN CALCIUM 40 MG: 40 TABLET, FILM COATED ORAL at 08:25

## 2020-12-05 RX ADMIN — LEVETIRACETAM 500 MG: 5 INJECTION INTRAVENOUS at 02:41

## 2020-12-05 RX ADMIN — LISINOPRIL 10 MG: 10 TABLET ORAL at 08:25

## 2020-12-05 RX ADMIN — LABETALOL HYDROCHLORIDE 20 MG: 5 INJECTION, SOLUTION INTRAVENOUS at 05:43

## 2020-12-05 RX ADMIN — METOPROLOL TARTRATE 50 MG: 50 TABLET, FILM COATED ORAL at 22:02

## 2020-12-05 RX ADMIN — AZITHROMYCIN MONOHYDRATE 500 MG: 500 INJECTION, POWDER, LYOPHILIZED, FOR SOLUTION INTRAVENOUS at 08:23

## 2020-12-05 RX ADMIN — LOSARTAN POTASSIUM 100 MG: 50 TABLET, FILM COATED ORAL at 16:34

## 2020-12-05 RX ADMIN — POTASSIUM CHLORIDE 40 MEQ: 1500 TABLET, EXTENDED RELEASE ORAL at 12:23

## 2020-12-05 RX ADMIN — ASPIRIN 325 MG ORAL TABLET 325 MG: 325 PILL ORAL at 08:25

## 2020-12-05 RX ADMIN — AMLODIPINE BESYLATE 10 MG: 5 TABLET ORAL at 08:25

## 2020-12-05 NOTE — ROUTINE PROCESS
Bedside shift change report given to Luis Enrique Fishman RN (oncoming nurse) by Bobbi Iqbal (offgoing nurse). Report included the following information SBAR, Kardex, MAR and Cardiac Rhythm Sinus Rhythm.

## 2020-12-05 NOTE — PROGRESS NOTES
Problem: Dysphagia (Adult)  Goal: *Acute Goals and Plan of Care (Insert Text)  Description: Speech Therapy Swallow Goals  Initiated 12/3/2020  -Patient will tolerate full liquid diet with nectar thick liquids without clinical indicators of aspiration given moderate cues within 7 day(s). [ ] Not met  [ ]  MET   [ ] Progressing  [ ] Nate Dieter  -Patient will tolerate PO trials without clinical indicators of aspiration given minimal cues within 7 day(s). [ ] Not met  [ ]  MET   [ ] Progressing  [ ] Nate Dieter  -Patient will participate in modified barium swallow study within 7  day(s). [ ] Not met  [ ]  MET   [ ] Progressing  [ ] Nate Dieter  -Patient will demonstrate understanding of swallow safety precautions and aspiration precautions, diet recs with min cues within 7 day(s). [ ] Not met  [ ]  MET   [ ] Progressing  [ ] Discontinue      Outcome: Progressing Towards Goal     Problem: Patient Education: Go to Patient Education Activity  Goal: Patient/Family Education  Outcome: Progressing Towards Goal    Pt positioned upright, alert, but drowsy and slow to respond. Nurse reported she ate an entire yogurt cup for breakfast and accepted her crush meds without difficulty. Initially Pt patient refused PO intake, but agreed to only cold nectar thick apple juice via cup. Pt required mild-mod verbal encouragement and provided limited participation. Timely swallows noted with good HLE via digital palpation. Recommend to continue full NTL w/ strict asp/GERD precautions and crush meds. ONLY feed when alert and accepting. Please encourage intake. Upgrade once more alert.

## 2020-12-05 NOTE — PROGRESS NOTES
Progress note    Subjective:   Chief Complaint : seizure-like activity                                Worsening left sided weakness prior to arrival.   Source of information : EMS, patients grand-daughter. granddaughter mentioned she isn't aware of her meds taken at home, but denies any h/o seizures or has a residual left sided weakness from stroke in 2019. Unfortunately family isnt aware of her meds. History of present illness:     89F, h.o breast cancer and stroke (without residual weakness) with seizure like activity and left sided weakness prior to arrival.     Being treated for seizures with possible pneumonia. MRI negative for acute stroke but PRES syndrome cannot be ruled out given hi blood pressure    talking to the grand daughter, at baseline she is ambulatory with walker. Also has a left sided weakness from stroke in 2019    PLAN:  Continue azithromycin for 5 more days  KEPPRA 500mg BID   Likely IRF SNF, likely be medically stable by Monday or Tuesday. Past Medical History:   Diagnosis Date    Breast cancer ([facility] [address]     Stroke (cerebrum) ([facility] [address] 2019     History reviewed. No pertinent surgical history. History reviewed. No pertinent family history. Social History     Tobacco Use    Smoking status: Not on file   Substance Use Topics    Alcohol use: Not on file       Prior to Admission medications    Not on File     No Known Allergies          Review of Systems:  Unable to perform ROS: Mental status change     Vitals:     Visit Vitals  BP (!) 169/76   Pulse (!) 106   Temp 98.7 °F (37.1 °C)   Resp 20   Ht 5' 7\" (1.702 m)   Wt 113.4 kg (250 lb)   SpO2 95%   BMI 39.16 kg/m²       Physical Exam:   Constitutional: Ill appearing. Lethargic. Head: Normocephalic and atraumatic. Mouth/Throat: Airway patent. Moist mucous membranes. Eyes: EOMI. No scleral icterus. Neck: Neck supple. Cardiovascular: Tachy rate, regular rhythm. Normal heart sounds. No murmur, rub, or gallop.  Good pulses throughout. Pulmonary/Chest: No respiratory distress. Clear to auscultation bilaterally. No wheezes, rales, or rhonchi. Abdominal/GI: BS normal, Soft, non-tender, non-distended. No rebound or guarding. Musculoskeletal: No gross injuries or deformities. Neurological: Awake and oriented to self and place. LUE and LLE rhythmic tremors. Not following commands on left side, following commands to move both RUE and RLE. Left facial droop. Psych: Unable to assess. Skin: Skin is warm and dry. No rash noted. Data Review:   Recent Results (from the past 24 hour(s))   SARS-COV-2    Collection Time: 12/04/20  4:00 PM   Result Value Ref Range    SARS-CoV-2 Please find results under separate order     LACTIC ACID    Collection Time: 12/04/20  7:55 PM   Result Value Ref Range    Lactic acid 0.9 0.4 - 2.0 mmol/L   METABOLIC PANEL, BASIC    Collection Time: 12/04/20  7:59 PM   Result Value Ref Range    Sodium 145 136 - 145 mmol/L    Potassium 3.1 (L) 3.5 - 5.1 mmol/L    Chloride 109 (H) 97 - 108 mmol/L    CO2 26 21 - 32 mmol/L    Anion gap 10 5 - 15 mmol/L    Glucose 88 65 - 100 mg/dL    BUN 21 (H) 6 - 20 mg/dL    Creatinine 0.84 0.55 - 1.02 mg/dL    BUN/Creatinine ratio 25 (H) 12 - 20      GFR est AA >60 >60 ml/min/1.73m2    GFR est non-AA >60 >60 ml/min/1.73m2    Calcium 9.2 8.5 - 10.1 mg/dL   CBC W/O DIFF    Collection Time: 12/04/20  7:59 PM   Result Value Ref Range    WBC 8.2 3.6 - 11.0 K/uL    RBC 3.82 3.80 - 5.20 M/uL    HGB 9.1 (L) 11.5 - 16.0 g/dL    HCT 29.6 (L) 35.0 - 47.0 %    MCV 77.5 (L) 80.0 - 99.0 FL    MCH 23.8 (L) 26.0 - 34.0 PG    MCHC 30.7 30.0 - 36.5 g/dL    RDW 16.6 (H) 11.5 - 14.5 %    PLATELET 834 982 - 687 K/uL    MPV 11.5 8.9 - 12.9 FL    NRBC 0.0 0  WBC    ABSOLUTE NRBC 0.00 0.00 - 0.01 K/uL             Assessment and Plan :     (1) seizures : keppra 500mg BID. Seizures precaution.  LA in AM.      (2) no acute stroke but possibility posterior circulation ischemia cannot be rule out aspirin, statin, lipid panel, A1c, speech. MRI today. (3) HTN urgency: will order labetalol 10 Q4H PRN for BP> 105 systolic. On amlodipine, coreg and lisinopril. Unable to find her home meds, granddaughter unaware., will try and get them been 3 days sine the event, try to lower the BP. (4) h/o CVA: complicates #1    (5) RENAN: s/t prerenal. LR bolus. Repeat in AM    (6) hypokalemia:     (7) pneumonia: azithromycin. R/o COVID. PLAN:  Continue azithromycin for 5 more days  KEPPRA 500mg BID   Likely IRF SNF, likely be medically stable by Monday or Tuesday. DVT ppx: heparin SubQ, pending home meds.      Signed By: Kaykay Lopez MD     December 5, 2020

## 2020-12-05 NOTE — ROUTINE PROCESS
Bedside and Verbal shift change report given to Rajendra Khan (oncoming nurse) by Jordis Sever (offgoing nurse). Report included the following information SBAR, Kardex, Intake/Output, MAR, Accordion and Recent Results.

## 2020-12-06 LAB — SARS-COV-2, COV2NT: NOT DETECTED

## 2020-12-06 PROCEDURE — 74011250637 HC RX REV CODE- 250/637: Performed by: HOSPITALIST

## 2020-12-06 PROCEDURE — 65270000029 HC RM PRIVATE

## 2020-12-06 PROCEDURE — 74011250637 HC RX REV CODE- 250/637: Performed by: STUDENT IN AN ORGANIZED HEALTH CARE EDUCATION/TRAINING PROGRAM

## 2020-12-06 PROCEDURE — 97110 THERAPEUTIC EXERCISES: CPT

## 2020-12-06 PROCEDURE — 74011250636 HC RX REV CODE- 250/636: Performed by: FAMILY MEDICINE

## 2020-12-06 PROCEDURE — 74011250636 HC RX REV CODE- 250/636: Performed by: HOSPITALIST

## 2020-12-06 RX ORDER — METOPROLOL TARTRATE 50 MG/1
100 TABLET ORAL 2 TIMES DAILY
Status: DISCONTINUED | OUTPATIENT
Start: 2020-12-06 | End: 2020-12-15 | Stop reason: HOSPADM

## 2020-12-06 RX ORDER — POTASSIUM CHLORIDE 20 MEQ/1
40 TABLET, EXTENDED RELEASE ORAL
Status: DISPENSED | OUTPATIENT
Start: 2020-12-06 | End: 2020-12-06

## 2020-12-06 RX ORDER — HALOPERIDOL 5 MG/ML
5 INJECTION INTRAMUSCULAR ONCE
Status: COMPLETED | OUTPATIENT
Start: 2020-12-06 | End: 2020-12-06

## 2020-12-06 RX ORDER — HYDRALAZINE HYDROCHLORIDE 25 MG/1
25 TABLET, FILM COATED ORAL 3 TIMES DAILY
Status: DISCONTINUED | OUTPATIENT
Start: 2020-12-06 | End: 2020-12-08

## 2020-12-06 RX ORDER — AZITHROMYCIN 500 MG/1
500 TABLET, FILM COATED ORAL DAILY
Status: DISPENSED | OUTPATIENT
Start: 2020-12-06 | End: 2020-12-11

## 2020-12-06 RX ORDER — HYDROXYZINE HYDROCHLORIDE 25 MG/ML
25 INJECTION, SOLUTION INTRAMUSCULAR
Status: DISCONTINUED | OUTPATIENT
Start: 2020-12-06 | End: 2020-12-06

## 2020-12-06 RX ORDER — POTASSIUM CHLORIDE 20 MEQ/1
40 TABLET, EXTENDED RELEASE ORAL
Status: COMPLETED | OUTPATIENT
Start: 2020-12-06 | End: 2020-12-06

## 2020-12-06 RX ORDER — LEVETIRACETAM 500 MG/1
500 TABLET ORAL 2 TIMES DAILY
Status: DISCONTINUED | OUTPATIENT
Start: 2020-12-06 | End: 2020-12-15 | Stop reason: HOSPADM

## 2020-12-06 RX ADMIN — POTASSIUM CHLORIDE 40 MEQ: 1500 TABLET, EXTENDED RELEASE ORAL at 13:56

## 2020-12-06 RX ADMIN — HALOPERIDOL LACTATE 5 MG: 5 INJECTION, SOLUTION INTRAMUSCULAR at 00:48

## 2020-12-06 RX ADMIN — LOSARTAN POTASSIUM 100 MG: 50 TABLET, FILM COATED ORAL at 09:14

## 2020-12-06 RX ADMIN — PANTOPRAZOLE SODIUM 40 MG: 20 TABLET, DELAYED RELEASE ORAL at 05:10

## 2020-12-06 RX ADMIN — HYDRALAZINE HYDROCHLORIDE 25 MG: 25 TABLET, FILM COATED ORAL at 16:00

## 2020-12-06 RX ADMIN — ATORVASTATIN CALCIUM 40 MG: 40 TABLET, FILM COATED ORAL at 09:14

## 2020-12-06 RX ADMIN — POTASSIUM CHLORIDE 40 MEQ: 1500 TABLET, EXTENDED RELEASE ORAL at 16:57

## 2020-12-06 RX ADMIN — ASPIRIN 325 MG ORAL TABLET 325 MG: 325 PILL ORAL at 09:13

## 2020-12-06 RX ADMIN — AZITHROMYCIN MONOHYDRATE 500 MG: 500 INJECTION, POWDER, LYOPHILIZED, FOR SOLUTION INTRAVENOUS at 09:00

## 2020-12-06 RX ADMIN — HYDRALAZINE HYDROCHLORIDE 25 MG: 25 TABLET, FILM COATED ORAL at 22:38

## 2020-12-06 RX ADMIN — METOPROLOL TARTRATE 100 MG: 50 TABLET, FILM COATED ORAL at 22:38

## 2020-12-06 RX ADMIN — LEVETIRACETAM 500 MG: 500 TABLET ORAL at 22:38

## 2020-12-06 RX ADMIN — LEVETIRACETAM 500 MG: 500 TABLET ORAL at 13:56

## 2020-12-06 RX ADMIN — AMLODIPINE BESYLATE 10 MG: 5 TABLET ORAL at 09:14

## 2020-12-06 RX ADMIN — TRAZODONE HYDROCHLORIDE 50 MG: 50 TABLET ORAL at 22:38

## 2020-12-06 RX ADMIN — LEVETIRACETAM 500 MG: 5 INJECTION INTRAVENOUS at 03:44

## 2020-12-06 RX ADMIN — METOPROLOL TARTRATE 50 MG: 50 TABLET, FILM COATED ORAL at 09:14

## 2020-12-06 RX ADMIN — MELOXICAM 7.5 MG: 7.5 TABLET ORAL at 13:59

## 2020-12-06 NOTE — PROGRESS NOTES
Progress note    Subjective:   Chief Complaint : seizure-like activity                                Worsening left sided weakness prior to arrival.   Source of information : EMS, patients grand-daughter. History of present illness:     89F, h.o breast cancer and stroke (prior left sided wekaness) with seizure like activity and left sided weakness prior to arrival.     Being treated for seizures with possible pneumonia. MRI negative for acute stroke but PRES syndrome cannot be ruled out given hi blood pressure    talking to the grand daughter, at baseline she is ambulatory with walker. Also has a left sided weakness from stroke in 2019    PLAN:  Hor HT: increase metaprolol to 100 BID  Add hydralazine 25 TID  Continue amlodipine, losartan. Past Medical History:   Diagnosis Date    Breast cancer ([facility] [address]     Stroke (cerebrum) ([facility] [address] 2019     History reviewed. No pertinent surgical history. History reviewed. No pertinent family history. Social History     Tobacco Use    Smoking status: Not on file   Substance Use Topics    Alcohol use: Not on file       Prior to Admission medications    Not on File     No Known Allergies          Review of Systems:  Unable to perform ROS: Mental status change     Vitals:     Visit Vitals  BP (!) 179/90   Pulse (!) 103   Temp 97.3 °F (36.3 °C)   Resp 18   Ht 5' 7\" (1.702 m)   Wt 113.4 kg (250 lb)   SpO2 96%   BMI 39.16 kg/m²       Physical Exam:   Constitutional: Ill appearing. Lethargic. Head: Normocephalic and atraumatic. Mouth/Throat: Airway patent. Moist mucous membranes. Eyes: EOMI. No scleral icterus. Neck: Neck supple. Cardiovascular: Tachy rate, regular rhythm. Normal heart sounds. No murmur, rub, or gallop. Good pulses throughout. Pulmonary/Chest: No respiratory distress. Clear to auscultation bilaterally. No wheezes, rales, or rhonchi. Abdominal/GI: BS normal, Soft, non-tender, non-distended. No rebound or guarding.   Musculoskeletal: No gross injuries or deformities. Neurological: Awake and oriented to self and place. LUE and LLE rhythmic tremors. Not following commands on left side, following commands to move both RUE and RLE. Left facial droop. Psych: Unable to assess. Skin: Skin is warm and dry. No rash noted. Data Review:   No results found for this or any previous visit (from the past 24 hour(s)). Assessment and Plan :     (1) seizures : keppra 500mg BID. Seizures precaution. LA in AM.      (2) no acute stroke but possibility posterior circulation ischemia cannot be rule out aspirin, statin, lipid panel, A1c, speech. MRI today. (3) HTN urgency: will order labetalol 10 Q4H PRN for BP> 775 systolic. On amlodipine, metoprolo 100 BID and losartan 100. Add hydralaxine 50 TID    (4) h/o CVA: complicates #1    (5) RENAN: s/t prerenal. LR bolus. Repeat in AM    (6) hypokalemia:     (7) pneumonia: azithromycin. R/o COVID. PLAN:  IRF when ready, no iv meds. DVT ppx: heparin SubQ, pending home meds.      Signed By: Skip Snyder MD     December 6, 2020

## 2020-12-06 NOTE — PROGRESS NOTES
Problem: Mobility Impaired (Adult and Pediatric)  Goal: *Acute Goals and Plan of Care (Insert Text)  Description: Pt will be I with LE HEP in 7 days. Pt will perform bed mobility with mod A x1 in 7 days. Pt will perform transfers with mod A x1 in 7 days. Pt will amb 10-25 feet with LRAD safely with mod A x1 in 7 days. Outcome: Progressing Towards Goal   PHYSICAL THERAPY TREATMENT  Patient: Inga Guidry (66 y.o. female)  Date: 12/6/2020  Diagnosis: Seizure ([facility] [address] [R56.9]   <principal problem not specified>       Precautions:    Chart, physical therapy assessment, plan of care and goals were reviewed. ASSESSMENT  Patient continues with skilled PT services and is progressing towards goals. Bed level exercises performed today since assist of only 1 and patient requires assistx2 for bed mobility and OOB activities. She was sleep upon entry  and nurse present senior care through stating that patient did not sleep at all last night. She was agreeable to perform LE TE with some AAROM. She did have a hard time following commands at some points; likely due to drowsiness more than anything. Good muscle activation noted in LEs and RUE>LUE. She will benefit from continued skilled therapy services to improve her strength for transfers and bed mobility to decrease assistance from caregivers. Current Level of Function Impacting Discharge (mobility/balance): decreased mobility, obesity     Other factors to consider for discharge:decreased cognition, confusion, drowsiness         PLAN :  Patient continues to benefit from skilled intervention to address the above impairments. Continue treatment per established plan of care. to address goals.     Recommendation for discharge: (in order for the patient to meet his/her long term goals)  Therapy up to 5 days/week in SNF setting    This discharge recommendation:  Has been made in collaboration with the attending provider and/or case management    IF patient discharges home will need the following DME: to be determined (TBD)       SUBJECTIVE:   Patient stated huh? I can move my legs some .     OBJECTIVE DATA SUMMARY:   Critical Behavior:  Neurologic State: Lethargic, Confused, Drowsy  Orientation Level: Oriented to time, Oriented to person  Cognition: Decreased attention/concentration, Decreased command following, Memory loss     Functional Mobility Training:  Bed Mobility:  Not performed due to level of assist needed for patient/therapist safety      Therapeutic Exercises:   Semi-supine, 2 x 10 BLEs: SLR, knee flexion, AP, hip abd/add  Semi-supine, 2 x 10, BUEs: shoulder flexion, cross body shoulder taps   Increased time to complete exercises to allow for rest breaks between sets and constant cueing as patient appeared to be dozing off at times   Pain Rating:  No number reported but she stated that she had some L hip pain with certain movements     Activity Tolerance:   Fair and requires frequent rest breaks  Please refer to the flowsheet for vital signs taken during this treatment. After treatment patient left in no apparent distress:   Supine in bed, Call bell within reach, Bed / chair alarm activated, and nurse present in room to administer meds     COMMUNICATION/COLLABORATION:   The patients plan of care was discussed with: Physical therapist and Registered nurse.      Grier Lundborg   Time Calculation: 26 mins

## 2020-12-06 NOTE — PROGRESS NOTES
Problem: Falls - Risk of  Goal: *Absence of Falls  Description: Document Teri Martinez Fall Risk and appropriate interventions in the flowsheet.   Outcome: Progressing Towards Goal  Note: Fall Risk Interventions:  Mobility Interventions: Bed/chair exit alarm, OT consult for ADLs, PT Consult for mobility concerns    Mentation Interventions: Adequate sleep, hydration, pain control, Bed/chair exit alarm, Door open when patient unattended, More frequent rounding, Reorient patient, Toileting rounds, Update white board    Medication Interventions: Bed/chair exit alarm    Elimination Interventions: Bed/chair exit alarm, Toileting schedule/hourly rounds    History of Falls Interventions: Bed/chair exit alarm, Room close to nurse's station

## 2020-12-06 NOTE — PROGRESS NOTES
Bedside shift change report given to Ally Faust RN (oncoming nurse) by Luis Enrique Fishman RN (offgoing nurse). Report included the following information SBAR, Intake/Output, MAR and Recent Results.

## 2020-12-07 PROCEDURE — 65270000029 HC RM PRIVATE

## 2020-12-07 PROCEDURE — 97530 THERAPEUTIC ACTIVITIES: CPT

## 2020-12-07 PROCEDURE — 74011250637 HC RX REV CODE- 250/637: Performed by: STUDENT IN AN ORGANIZED HEALTH CARE EDUCATION/TRAINING PROGRAM

## 2020-12-07 PROCEDURE — 77010033678 HC OXYGEN DAILY

## 2020-12-07 PROCEDURE — 74011250637 HC RX REV CODE- 250/637: Performed by: HOSPITALIST

## 2020-12-07 PROCEDURE — 94762 N-INVAS EAR/PLS OXIMTRY CONT: CPT

## 2020-12-07 RX ADMIN — PANTOPRAZOLE SODIUM 40 MG: 20 TABLET, DELAYED RELEASE ORAL at 05:50

## 2020-12-07 RX ADMIN — METOPROLOL TARTRATE 100 MG: 50 TABLET, FILM COATED ORAL at 21:48

## 2020-12-07 RX ADMIN — AZITHROMYCIN MONOHYDRATE 500 MG: 500 TABLET ORAL at 08:21

## 2020-12-07 RX ADMIN — METOPROLOL TARTRATE 100 MG: 50 TABLET, FILM COATED ORAL at 08:20

## 2020-12-07 RX ADMIN — TRAZODONE HYDROCHLORIDE 50 MG: 50 TABLET ORAL at 21:49

## 2020-12-07 RX ADMIN — LEVETIRACETAM 500 MG: 500 TABLET ORAL at 08:21

## 2020-12-07 RX ADMIN — HYDRALAZINE HYDROCHLORIDE 25 MG: 25 TABLET, FILM COATED ORAL at 21:49

## 2020-12-07 RX ADMIN — ASPIRIN 325 MG ORAL TABLET 325 MG: 325 PILL ORAL at 08:19

## 2020-12-07 RX ADMIN — ATORVASTATIN CALCIUM 40 MG: 40 TABLET, FILM COATED ORAL at 08:21

## 2020-12-07 RX ADMIN — LOSARTAN POTASSIUM 100 MG: 50 TABLET, FILM COATED ORAL at 08:20

## 2020-12-07 RX ADMIN — HYDRALAZINE HYDROCHLORIDE 25 MG: 25 TABLET, FILM COATED ORAL at 08:19

## 2020-12-07 RX ADMIN — HYDRALAZINE HYDROCHLORIDE 25 MG: 25 TABLET, FILM COATED ORAL at 15:52

## 2020-12-07 RX ADMIN — AMLODIPINE BESYLATE 10 MG: 5 TABLET ORAL at 08:20

## 2020-12-07 RX ADMIN — MELOXICAM 7.5 MG: 7.5 TABLET ORAL at 08:21

## 2020-12-07 RX ADMIN — LEVETIRACETAM 500 MG: 500 TABLET ORAL at 21:48

## 2020-12-07 NOTE — PROGRESS NOTES
Problem: Self Care Deficits Care Plan (Adult)  Goal: *Acute Goals and Plan of Care (Insert Text)  Description: Pt will be SBA sup <> sit in prep for EOB ADLs  Pt will be SBA grooming sitting EOB  Pt will be min A LE dressing sitting EOB/long sit  Pt will be min A sit <>  prep for toileting LRAD  Pt will be min A toileting/toilet transfer/cloth mgmt LRAD  Pt will be SPV following UE HEP in prep for self care tasks      Outcome: Progressing Towards Goal   OCCUPATIONAL THERAPY TREATMENT  Patient: Rose Wynne (36 y.o. female)  Date: 12/7/2020  Diagnosis: Seizure ([facility] [address] [R56.9]   <principal problem not specified>       Precautions:    Chart, occupational therapy assessment, plan of care, and goals were reviewed. ASSESSMENT  Patient continues with skilled OT services and is slowly progressing towards goals. Pt. Semi supine in bed upon arrival and agreeable to therapy session. Pt. Performed  bed mobility and transfers with max Ax2. Able to perform seated UE and LE TE  at EOB. Pt demonstrated Poor L side  strength with sit to stand and side stepping towards HOB. Pt. Required mod A for side stepping 3 steps towards HOB. Pt fatigued quickly. Pt required mod A for brushing hair. Pt able to  container and bring cup to mouth. Pt. Stated that she was feeling sad; emotional support provided by therapist.            PLAN :  Patient continues to benefit from skilled intervention to address the above impairments. Continue treatment per established plan of care. to address goals. Recommendation for discharge: (in order for the patient to meet his/her long term goals)  Therapy up to 5 days/week in SNF setting    This discharge recommendation:  Has been made in collaboration with the attending provider and/or case management    IF patient discharges home will need the following DME: to be determined       SUBJECTIVE:   Patient stated I'm just really weak. and Im feeling sad    OBJECTIVE DATA SUMMARY: Cognitive/Behavioral Status:  Neurologic State: Alert;Confused  Orientation Level: Oriented to person;Oriented to place;Oriented to situation  Cognition: Impaired decision making      Functional Mobility and Transfers for ADLs:  Bed Mobility:  Rolling: Moderate assistance;Assist x2  Supine to Sit: Moderate assistance;Assist x2  Sit to Supine: Moderate assistance  Scooting: Maximum assistance;Assist x2    Transfers:  Sit to Stand: Moderate assistance;Assist x2    Balance:  Sitting: Impaired; With support  Sitting - Static: Poor (constant support)  Sitting - Dynamic: Poor (constant support)  Standing: Impaired;Pull to stand; With support  Standing - Static: Poor;Constant support  Standing - Dynamic : Poor;Constant support    ADL Intervention:  Feeding  Drink to Mouth: Supervision    Grooming  Brushing/Combing Hair: Moderate assistance    Lower Body Dressing Assistance  Socks: Total assistance (dependent)  Position Performed: Seated edge of bed      Therapeutic Exercises:   Exercise Sets Reps AROM AAROM PROM Self PROM Comments   Shoulder flex/ext 1 10 [x] [] [] []    Elbow flex/ext 1 10 [x] [] [] []    Wrist flex/ext 1 10 [x] [] [] []       [] [] [] []          Pain:  0/0 pain reported    Activity Tolerance:   Fair  Please refer to the flowsheet for vital signs taken during this treatment. After treatment patient left in no apparent distress:   Supine in bed and Bed / chair alarm activated    COMMUNICATION/COLLABORATION:   The patients plan of care was discussed with: Physical therapy assistant and Registered nurse.      Errol Goldman  Time Calculation: 27 mins

## 2020-12-07 NOTE — PROGRESS NOTES
PHYSICAL THERAPY TREATMENT  Patient: Han Silverman (12 y.o. female)  Date: 12/7/2020  Diagnosis: Seizure ([facility] [R56.9]   <principal problem not specified>       Precautions:    Chart, physical therapy assessment, plan of care and goals were reviewed. ASSESSMENT  Patient continues with skilled PT services and is progressing towards goals. Pt. Semi supine in bed upon arrival and agreeable to therapy session. CO treat with DE LA CRUZ for patient and therapist safety. Session initiated with bed mobility and transfers. Able to perform seated LE TE with Mild LBP. Poor LLE strength with sit to stand and side stepping. Mod A for side stepping 3'. Pt. Very fatigued. Recommend DC to SNF VS. IRF. Left patient semi supine in bed with callbell and all needs met. Current Level of Function Impacting Discharge (mobility/balance): poor endurance    Other factors to consider for discharge: ble weakness         PLAN :  Patient continues to benefit from skilled intervention to address the above impairments. Continue treatment per established plan of care. to address goals. Recommendation for discharge: (in order for the patient to meet his/her long term goals)  Therapy up to 5 days/week in SNF setting    This discharge recommendation:  Has been made in collaboration with the attending provider and/or case management    IF patient discharges home will need the following DME: rolling walker       SUBJECTIVE:   Patient stated im not feeling the best.    OBJECTIVE DATA SUMMARY:   Critical Behavior:  Neurologic State: Alert, Confused  Orientation Level: Oriented to person, Oriented to place, Oriented to situation  Cognition: Impaired decision making     Functional Mobility Training:  Bed Mobility:  Rolling: Moderate assistance;Assist x2  Supine to Sit: Moderate assistance;Assist x2  Sit to Supine: Moderate assistance  Scooting: Maximum assistance;Assist x2        Transfers:  Sit to Stand:  Moderate assistance;Assist x2  Stand to Sit: Moderate assistance;Assist x2                             Balance:  Sitting: Impaired; With support  Sitting - Static: Poor (constant support)  Sitting - Dynamic: Poor (constant support)  Standing: Impaired;Pull to stand; With support  Standing - Static: Poor;Constant support  Standing - Dynamic : Poor;Constant support  Ambulation/Gait Training:                                                        Stairs: Therapeutic Exercises:   woodTherapeutic Exercises:       EXERCISE   Sets   Reps   Active Active Assist   Passive Self ROM   Comments   Ankle Pumps  10 [x] [] [] []    Quad Sets/Glut Sets   [] [] [] []    Hamstring Sets   [] [] [] []    Short Arc Quads   [] [] [] []    Heel Slides   [] [] [] []    Straight Leg Raises   [] [] [] []    Hip abd/add  10 [x] [] [] []    Long Arc Quads  10 [x] [] [] []    Marching  10 [x] [] [] []       [] [] [] []        Pain Ratin    Activity Tolerance:   Fair  Please refer to the flowsheet for vital signs taken during this treatment. After treatment patient left in no apparent distress:   Supine in bed    COMMUNICATION/COLLABORATION:   The patients plan of care was discussed with: Physical therapy assistant.      Maddy Farley   Time Calculation: 17 mins

## 2020-12-07 NOTE — PROGRESS NOTES
Progress note    Subjective:   Chief Complaint : seizure-like activity                                Worsening left sided weakness prior to arrival.   Source of information : EMS, patients grand-daughter. History of present illness:     89F, h.o breast cancer and stroke (prior left sided wekaness) with seizure like activity and left sided weakness prior to arrival.     Being treated for seizures with possible pneumonia. MRI negative for acute stroke but PRES syndrome cannot be ruled out given hi blood pressure    talking to the grand daughter, at baseline she is ambulatory with walker. Also has a left sided weakness from stroke in 2019    PLAN:  Hor HT: increase metaprolol to 100 BID  Add hydralazine 25 TID  Continue amlodipine, losartan. Past Medical History:   Diagnosis Date    Breast cancer ([facility] [address]     Stroke (cerebrum) ([facility] [address] 2019     History reviewed. No pertinent surgical history. History reviewed. No pertinent family history. Social History     Tobacco Use    Smoking status: Not on file   Substance Use Topics    Alcohol use: Not on file       Prior to Admission medications    Not on File     No Known Allergies          Review of Systems:  Unable to perform ROS: Mental status change     Vitals:     Visit Vitals  BP (!) 160/92   Pulse 92   Temp 98.3 °F (36.8 °C)   Resp 18   Ht 5' 7\" (1.702 m)   Wt 113.4 kg (250 lb)   SpO2 98%   BMI 39.16 kg/m²       Physical Exam:   Constitutional: Ill appearing. Lethargic. Head: Normocephalic and atraumatic. Mouth/Throat: Airway patent. Moist mucous membranes. Eyes: EOMI. No scleral icterus. Neck: Neck supple. Cardiovascular: Tachy rate, regular rhythm. Normal heart sounds. No murmur, rub, or gallop. Good pulses throughout. Pulmonary/Chest: No respiratory distress. Clear to auscultation bilaterally. No wheezes, rales, or rhonchi. Abdominal/GI: BS normal, Soft, non-tender, non-distended. No rebound or guarding.   Musculoskeletal: No gross injuries or deformities. Neurological: Awake and oriented to self and place. LUE and LLE rhythmic tremors. Not following commands on left side, following commands to move both RUE and RLE. Left facial droop. Psych: Unable to assess. Skin: Skin is warm and dry. No rash noted. Data Review:   No results found for this or any previous visit (from the past 24 hour(s)). Assessment and Plan :     (1) seizures : keppra 500mg BID. Seizures precaution. LA in AM.      (2) no acute stroke but possibility posterior circulation ischemia cannot be rule out aspirin, statin, lipid panel, A1c, speech. MRI today. (3) HTN urgency: will order labetalol 10 Q4H PRN for BP> 413 systolic. On amlodipine, metoprolo 100 BID and losartan 100. Add hydralaxine 50 TID    (4) h/o CVA: complicates #1    (5) RENAN: s/t prerenal. LR bolus. Repeat in AM    (6) hypokalemia:     (7) pneumonia: azithromycin. R/o COVID. PLAN:  IRF when ready, no iv meds. DVT ppx: heparin SubQ, pending home meds.      Signed By: Renard Cuenca MD     December 7, 2020

## 2020-12-07 NOTE — PROGRESS NOTES
Problem: Falls - Risk of  Goal: *Absence of Falls  Description: Document Linh Barnes Fall Risk and appropriate interventions in the flowsheet.   Outcome: Progressing Towards Goal  Note: Fall Risk Interventions:  Mobility Interventions: Bed/chair exit alarm    Mentation Interventions: Bed/chair exit alarm    Medication Interventions: Bed/chair exit alarm    Elimination Interventions: Bed/chair exit alarm    History of Falls Interventions: Bed/chair exit alarm

## 2020-12-07 NOTE — ROUTINE PROCESS
Report given to Minus Farnaz, RN oncoming nurse to include sbar, mar, kardex, and recent orders and changes

## 2020-12-08 PROCEDURE — 74011250637 HC RX REV CODE- 250/637: Performed by: HOSPITALIST

## 2020-12-08 PROCEDURE — 97530 THERAPEUTIC ACTIVITIES: CPT

## 2020-12-08 PROCEDURE — 77010033678 HC OXYGEN DAILY

## 2020-12-08 PROCEDURE — 74011250637 HC RX REV CODE- 250/637: Performed by: STUDENT IN AN ORGANIZED HEALTH CARE EDUCATION/TRAINING PROGRAM

## 2020-12-08 PROCEDURE — 65270000029 HC RM PRIVATE

## 2020-12-08 PROCEDURE — 94762 N-INVAS EAR/PLS OXIMTRY CONT: CPT

## 2020-12-08 PROCEDURE — 74011250637 HC RX REV CODE- 250/637: Performed by: INTERNAL MEDICINE

## 2020-12-08 PROCEDURE — 92526 ORAL FUNCTION THERAPY: CPT

## 2020-12-08 RX ORDER — HYDRALAZINE HYDROCHLORIDE 50 MG/1
50 TABLET, FILM COATED ORAL 3 TIMES DAILY
Status: DISCONTINUED | OUTPATIENT
Start: 2020-12-08 | End: 2020-12-15 | Stop reason: HOSPADM

## 2020-12-08 RX ADMIN — LEVETIRACETAM 500 MG: 500 TABLET ORAL at 21:03

## 2020-12-08 RX ADMIN — TRAZODONE HYDROCHLORIDE 50 MG: 50 TABLET ORAL at 21:02

## 2020-12-08 RX ADMIN — PANTOPRAZOLE SODIUM 40 MG: 20 TABLET, DELAYED RELEASE ORAL at 08:26

## 2020-12-08 RX ADMIN — HYDRALAZINE HYDROCHLORIDE 50 MG: 50 TABLET, FILM COATED ORAL at 21:02

## 2020-12-08 RX ADMIN — METOPROLOL TARTRATE 100 MG: 50 TABLET, FILM COATED ORAL at 21:02

## 2020-12-08 RX ADMIN — LEVETIRACETAM 500 MG: 500 TABLET ORAL at 08:26

## 2020-12-08 RX ADMIN — AZITHROMYCIN MONOHYDRATE 500 MG: 500 TABLET ORAL at 08:25

## 2020-12-08 RX ADMIN — HYDRALAZINE HYDROCHLORIDE 25 MG: 25 TABLET, FILM COATED ORAL at 08:26

## 2020-12-08 RX ADMIN — HYDRALAZINE HYDROCHLORIDE 50 MG: 50 TABLET, FILM COATED ORAL at 17:50

## 2020-12-08 RX ADMIN — ATORVASTATIN CALCIUM 40 MG: 40 TABLET, FILM COATED ORAL at 08:27

## 2020-12-08 RX ADMIN — ASPIRIN 325 MG ORAL TABLET 325 MG: 325 PILL ORAL at 08:26

## 2020-12-08 RX ADMIN — LOSARTAN POTASSIUM 100 MG: 50 TABLET, FILM COATED ORAL at 08:27

## 2020-12-08 RX ADMIN — AMLODIPINE BESYLATE 10 MG: 5 TABLET ORAL at 08:27

## 2020-12-08 RX ADMIN — METOPROLOL TARTRATE 100 MG: 50 TABLET, FILM COATED ORAL at 08:27

## 2020-12-08 RX ADMIN — MELOXICAM 7.5 MG: 7.5 TABLET ORAL at 08:27

## 2020-12-08 NOTE — PROGRESS NOTES
PHYSICAL THERAPY TREATMENT  Patient: Patricia Ledbetter (49 y.o. female)  Date: 12/8/2020  Diagnosis: Seizure ([facility] [R56.9]   <principal problem not specified>       Precautions:    Chart, physical therapy assessment, plan of care and goals were reviewed. ASSESSMENT  Patient continues with skilled PT services and is progressing towards goals. Pt semi supine in bed agreeable to therapy but was confused and disoriented. Patient progressing well but still remains weak and c/o back and hip pain on the left side limiting mobility. Requesting to lay back down frequently during tx. Patient tolerated a few LE exercises well but required max A or mod A x2 for bed mobility. Patient was min A x2 sit to stand transfers and to take a few side steps EOB. Difficulty advancing LEs and demos post lean with standing. Will continue to further progress towards PT goals. Current Level of Function Impacting Discharge (mobility/balance): weakness, pain, mod A x2 for mobility. Other factors to consider for discharge: confusion, safety awareness. PLAN :  Patient continues to benefit from skilled intervention to address the above impairments. Continue treatment per established plan of care. to address goals. Recommendation for discharge: (in order for the patient to meet his/her long term goals)  Therapy up to 5 days/week in SNF setting    This discharge recommendation:  Has been made in collaboration with the attending provider and/or case management    IF patient discharges home will need the following DME: to be determined (TBD)       SUBJECTIVE:   Patient stated I am terribly forgetful.     OBJECTIVE DATA SUMMARY:   Critical Behavior:  Neurologic State: Alert, Confused  Orientation Level: Disoriented to situation, Disoriented to time, Oriented to person, Oriented to place  Cognition: Decreased attention/concentration     Functional Mobility Training:  Bed Mobility:  Rolling:  Moderate assistance;Assist x1  Supine to Sit: Maximum assistance;Assist x1  Sit to Supine: Moderate assistance;Assist x2  Scooting: Maximum assistance        Transfers:  Sit to Stand: Minimum assistance;Assist x2  Stand to Sit: Minimum assistance;Assist x2    Balance:  Sitting: Impaired; With support  Sitting - Static: Fair (occasional)  Sitting - Dynamic: Fair (occasional)  Standing: Impaired;Pull to stand; With support  Standing - Static: Poor;Constant support  Standing - Dynamic : Poor;Constant support  Ambulation/Gait Training:  Distance (ft): 2 Feet (ft)(side steps EOB)  Assistive Device: Gait belt;Walker, rolling  Ambulation - Level of Assistance: Minimal assistance;Assist x2  Speed/Karo: Slow  Step Length: Left shortened;Right shortened  Post lean with standing. Therapeutic Exercises:   10x LAQ right side, AP and heel slides bilaterally    Pain Rating:  Faces moderate pain 5/10 on the left hip and back    Activity Tolerance:   Fair pt complained of dizziness so BP taken. /82 and 175/82. Notified nursing who provided meds prior to session. After treatment patient left in no apparent distress:   Supine in bed, Call bell within reach, Bed / chair alarm activated, and Side rails x 3    COMMUNICATION/COLLABORATION:   The patients plan of care was discussed with: Occupational therapy assistant and Registered nurse. Cotx with ORTIZ for increased safety. Coy Dominguez   Time Calculation: 29 mins         Problem: Mobility Impaired (Adult and Pediatric)  Goal: *Acute Goals and Plan of Care (Insert Text)  Description: Pt will be I with LE HEP in 7 days. Pt will perform bed mobility with mod A x1 in 7 days. Pt will perform transfers with mod A x1 in 7 days. Pt will amb 10-25 feet with LRAD safely with mod A x1 in 7 days.          Outcome: Progressing Towards Goal

## 2020-12-08 NOTE — ROUTINE PROCESS
Bedside and Verbal shift change report given to Cori Greene RN(oncoming nurse) by Aster Mariscal RN(offgoing nurse). Report included the following information SBAR, Kardex, MAR and Cardiac Rhythm Sinus Rhythm.

## 2020-12-08 NOTE — PROGRESS NOTES
SPEECH LANGUAGE PATHOLOGY DYSPHAGIA TREATMENT  Patient: Arya Pearce (20 y.o. female)  Date: 12/8/2020  Diagnosis: Seizure ([facility] [address] [R56.9]   <principal problem not specified>       Precautions: Aspiration    ASSESSMENT:  Patient presents with mild oropharyngeal dysphagia. Oral phase c/b mild delay in A-P transit following presentations of puree. Patient required max verbal prompts to refrain from talking following each presentation of puree. WFL with NTL. Patient did not follow commands. Overt s/s of pen/aspiration c/b coughing with puree. Patient required max verbal prompts to take small single sips of NTL. Patient did not follow commands. No overt s/s of pen/aspiration following NTL presentations. Pharyngeal phase c/b mild delay in initiation of swallow following presentations of puree. HLE appears adequate to palpation. Nursing reported patient is tolerating full NTL diet. PLAN:  Recommendations and Planned Interventions:  Rec continue full NTL w/ strict asp/GERD precautions and crush meds. ONLY feed when alert and accepting. Recommend trial puree and upgrade when appropriate     Patient continues to benefit from skilled intervention to address the above impairments. Continue treatment per established plan of care. Frequency/Duration: Patient will be followed by speech-language pathology daily to address goals. Discharge Recommendations:  SNF vs. IRF pending progress     SUBJECTIVE:   Patient is agreeable to beginning treatment. OBJECTIVE:     Cognitive and Communication Status:  Neurologic State: Alert  Orientation Level: Oriented to person  Cognition: Decreased attention/concentration    Dysphagia Treatment:  Oral Assessment:  P.O.  Trials:  Patient Position: (HOB raised)  Vocal quality prior to P.O.: No impairment  Consistency Presented: Nectar thick liquid;Puree  How Presented: SLP-fed/presented;Straw;Successive swallows;Spoon  How Much: (multiple presentations)  Bolus Acceptance: No impairment  Bolus Formation/Control: No impairment  Propulsion: Delayed (# of seconds)  Oral Residue: None  Initiation of Swallow: Delayed (# of seconds)  Laryngeal Elevation: Functional  Aspiration Signs/Symptoms: Weak cough    Pain: 0    After treatment:   Patient left in no apparent distress in bed, Call bell within reach, and Nursing notified    COMMUNICATION/EDUCATION:   Patient was educated regarding her deficit(s) of dysphagia, swallow safety precautions, diet recs and POC. She demonstrated guarded understanding as evidenced by AMS. The patient's plan of care including recommendations, planned interventions, and recommended diet changes were discussed with: Registered nurse. Delfino Montes M.S. CCC-SLP  Time Calculation: 16 mins      Problem: Dysphagia (Adult)  Goal: *Acute Goals and Plan of Care (Insert Text)  Description: Speech Therapy Swallow Goals  Initiated 12/3/2020  -Patient will tolerate full liquid diet with nectar thick liquids without clinical indicators of aspiration given moderate cues within 7 day(s). [ ] Not met  [ ]  MET   [ ] Progressing  [ ] Sherill Gip  -Patient will tolerate PO trials without clinical indicators of aspiration given minimal cues within 7 day(s). [ ] Not met  [ ]  MET   [ ] Progressing  [ ] Sherill Gip  -Patient will participate in modified barium swallow study within 7  day(s). [ ] Not met  [ ]  MET   [ ] Progressing  [ ] Sherill Gip  -Patient will demonstrate understanding of swallow safety precautions and aspiration precautions, diet recs with min cues within 7 day(s).         [ ] Not met  [ ]  MET   [ ] Jennifer Winters  [ ] Temoill Gip    12/8/2020 1026 by Aram Melgoza, SLP  Outcome: Progressing Towards Goal

## 2020-12-08 NOTE — ROUTINE PROCESS
Bedside and Verbal shift change report given to Janny Rabago (oncoming nurse) by Chasidy Garcia (offgoing nurse). Report included the following information SBAR, Kardex, Intake/Output, MAR, Accordion and Cardiac Rhythm Sinus Rhythm.

## 2020-12-08 NOTE — PROGRESS NOTES
Comprehensive Nutrition Assessment    Type and Reason for Visit: Initial(dysphagia)    Nutrition Recommendations/Plan:   Continue Full NTL diet     Advance per SLP recs    Add Ensure Enlive (NTL) TID  Add Ensure Pdg TID    Document all PO intakes in EMR    Obtain updated measured BW    Nutrition Assessment:  Seizure-like activity pta, ? stroke. Neuro consulted- MRI without acute findings. Noted possible PRES syndrome 2/2 persistently high BP. D/c to SNF pending auth. SLP following, advanced to current Full/NTL diet on 12/2. Diet remains ordered through repeat assessments. PO intakes documented as 25-35%. RN endorses poor PO intakes, typically ~25% tray. RD to order supplementation. Do not rec initiation of TF unless POC discussion complete. Labs: H/H 9.1/29.6, K 3.1. Meds: statin, azithromycin, metroprolol, PPI. Malnutrition Assessment:  Malnutrition Status:  Mild malnutrition    Context:  Acute illness     Findings of the 6 clinical characteristics of malnutrition:   Energy Intake:  7 - 50% or less of est energy requirements for 5 or more days  Weight Loss:  Unable to assess     Body Fat Loss:  No significant body fat loss,     Muscle Mass Loss:  No significant muscle mass loss,    Fluid Accumulation:  No significant fluid accumulation,        Estimated Daily Nutrient Needs:  Energy (kcal): 1850kcal (25kcal/kg); Weight Used for Energy Requirements: Adjusted  Protein (g): 74g (1g/kg); Weight Used for Protein Requirements: Adjusted  Fluid (ml/day): 1850mL; Method Used for Fluid Requirements: 1 ml/kcal      Nutrition Related Findings:  NFPE without acute findings. Last BM 12/7, formed. No edema. No documented hx dysphagia, n/v, or c/d in EMR.       Wounds:    None       Current Nutrition Therapies:  DIET FULL LIQUID 2 Atlas/2 Mildly Thick    Anthropometric Measures:  · Height:  5' 7\" (170.2 cm)  · Current Body Wt:  113.4 kg (250 lb)   · Ideal Body Wt:  135 lbs:  185.2 %   · Adjusted Body Weight: (74.1kg)   · BMI Category:  Obese class 2 (BMI 35.0-39. 9)       Nutrition Diagnosis:   · Swallowing difficulty related to cognitive or neurological impairment as evidenced by (SLP rec'd dysphagia diet)      Nutrition Interventions:   Food and/or Nutrient Delivery: Continue current diet, Start oral nutrition supplement  Nutrition Education and Counseling: No recommendations at this time  Coordination of Nutrition Care: Continue to monitor while inpatient    Goals:  Meet >75% EENs via PO in 7 days  Wt loss 1kg +/- 0.5kg per week of measured wt  Maintain skin integrity  Lytes wnl       Nutrition Monitoring and Evaluation:   Behavioral-Environmental Outcomes: None identified  Food/Nutrient Intake Outcomes: Supplement intake, Diet advancement/tolerance  Physical Signs/Symptoms Outcomes: Biochemical data, Weight, Skin    Discharge Planning:     Too soon to determine     Electronically signed by Carina Carter on 12/8/2020 at 3:09 PM    Contact:

## 2020-12-08 NOTE — PROGRESS NOTES
Problem: Self Care Deficits Care Plan (Adult)  Goal: *Acute Goals and Plan of Care (Insert Text)  Description: Pt will be SBA sup <> sit in prep for EOB ADLs  Pt will be SBA grooming sitting EOB  Pt will be min A LE dressing sitting EOB/long sit  Pt will be min A sit <>  prep for toileting LRAD  Pt will be min A toileting/toilet transfer/cloth mgmt LRAD  Pt will be SPV following UE HEP in prep for self care tasks      Outcome: Progressing Towards Goal   OCCUPATIONAL THERAPY TREATMENT  Patient: Han Silverman (40 y.o. female)  Date: 12/8/2020  Diagnosis: Seizure ([facility] [address] [R56.9]   <principal problem not specified>       Precautions:    Chart, occupational therapy assessment, plan of care, and goals were reviewed. ASSESSMENT  Patient continues with skilled OT services and is progressing towards goals. Pt received semi supine in bed with RN in pt's room, agreeable to therapy. Pt demonstrated confusion and disoriented throughout therapy session. Pt. Overall performed bed mobility with Max A, pt stated increased back and Left side pain with sitting at EOB. Provided cues for sitting up-right d/t pt leaning back wards d/t increased pain. Pt. Performed sit-> stand with Max A x2. Oral hygiene performed while pt was sitting at EOB with set-up assistance. UE therex performed seated EOB-see details below- pt demonstrates decreased ROM on L side UE and LE d/t deficits from pervious stroke. Pt. C/o dizziness while standing at EOB- BP taken while pt sitting at EOB -/82 and once semi-supine in bed- /82. RN made aware of increased BP. Current Level of Function Impacting Discharge (ADLs): Increased confusion, increased need for assistance with all ADL's and set-up assistance     PLAN :  Patient continues to benefit from skilled intervention to address the above impairments. Continue treatment per established plan of care. to address goals.   Recommendation for discharge: (in order for the patient to meet his/her long term goals)  Therapy up to 5 days/week in SNF setting    This discharge recommendation:  Has been made in collaboration with the attending provider and/or case management    IF patient discharges home will need the following DME: to be determined       SUBJECTIVE:   Patient stated  im just so weak.     OBJECTIVE DATA SUMMARY:   Cognitive/Behavioral Status:  Neurologic State: Alert  Orientation Level: Oriented to person    Functional Mobility and Transfers for ADLs:  Bed Mobility:  Rolling: Moderate assistance;Assist x1  Supine to Sit: Maximum assistance;Assist x1  Sit to Supine: Moderate assistance;Assist x2  Scooting: Maximum assistance    Transfers:  Sit to Stand: Minimum assistance;Assist x2    Balance:  Sitting: Impaired; With support  Sitting - Static: Fair (occasional)  Sitting - Dynamic: Fair (occasional)  Standing: Impaired;Pull to stand; With support  Standing - Static: Poor;Constant support  Standing - Dynamic : Poor;Constant support    ADL Intervention:    Grooming  Grooming Assistance: Set-up; Stand-by assistance  Brushing Teeth: Stand-by assistance    Therapeutic Exercises:   Exercise Sets Reps AROM AAROM PROM Self PROM Comments   Shoulder flex/ext 1 10 [x] [] [] []    Elbow flex/ext 1 10 [x] [] [] []    Wrist flex/ext 1 10 [x] [] [] []    Functional reaching  1 10 [x] [] [] []          Pain:  Pt. Stated increased pain in back and Left side of hip with sitting at EOB. Activity Tolerance:   Fair  Pt complained of dizziness so BP taken. /82 and 175/82. Notified nursing who provided meds prior to session  Please refer to the flowsheet for vital signs taken during this treatment.     After treatment patient left in no apparent distress:   Supine in bed, Heels elevated for pressure relief, Call bell within reach, Bed / chair alarm activated, and Side rails x 3    COMMUNICATION/COLLABORATION:   The patients plan of care was discussed with: Physical therapy assistant and Registered nurse.     Luis Fernando Has  Time Calculation: 29 mins

## 2020-12-08 NOTE — ROUTINE PROCESS
Bedside shift report given to Maxime Montalvo RN oncoming nurse to include mar, sbar, kardex, and recent orders and changes

## 2020-12-08 NOTE — PROGRESS NOTES
Patient is pending authorization with [facility] Will continue to follow for discharge needs.

## 2020-12-08 NOTE — PROGRESS NOTES
Progress note    Subjective:   Chief Complaint : seizure-like activity                                Worsening left sided weakness prior to arrival.   Source of information : EMS, patients grand-daughter. History of present illness:     89F, h.o breast cancer and stroke (prior left sided wekaness) with seizure like activity and left sided weakness prior to arrival.   Being treated for seizures with possible pneumonia. MRI negative for acute stroke but PRES syndrome cannot be ruled out given hi blood pressure  Per grand-daughter, at baseline she is ambulatory with walker. Also has a left sided weakness from stroke in 2019    : alert, pleasantly confused aao x 2. Denies specific complaints. BP remains uncontrolled.  [facility] HT: increase metaprolol to 100 BID  Add hydralazine 25 TID  Continue amlodipine, losartan. Past Medical History:   Diagnosis Date    [facility] ([facility] [address]     Stroke (cerebrum) ([facility] [address] 2019     History reviewed. No pertinent surgical history. History reviewed. No pertinent family history. Social History     Tobacco Use    Smoking status: Not on file   Substance Use Topics    Alcohol use: Not on file       Prior to Admission medications    Not on File     No Known Allergies          Review of Systems:  Review of Systems   Constitutional: Negative for chills and fever. HENT: Negative. Eyes: Negative. Respiratory: Positive for cough. Cardiovascular: Negative. Gastrointestinal: Negative. Genitourinary: Negative. Musculoskeletal: Negative. Skin: Negative. Neurological: Positive for weakness. Endo/Heme/Allergies: Negative. Psychiatric/Behavioral: Negative. Vitals:     Visit Vitals  BP (!) 182/79   Pulse 92   Temp 98 °F (36.7 °C)   Resp 20   Ht 5' 7\" (1.702 m)   Wt 113.4 kg (250 lb)   SpO2 96%   BMI 39.16 kg/m²       Physical Exam:   Constitutional: elderly, nad, looks stated age.  Speaking clearly  Head: Normocephalic and atraumatic. Mouth/Throat: Moist mucous membranes. Eyes: EOMI. No scleral icterus. Neck: Neck supple. Cardiovascular: regular rate and rhythm. Normal heart sounds. No murmur, rub, or gallop. Good pulses throughout. Pulmonary/Chest: No respiratory distress. Clear to auscultation bilaterally. No wheezes, rales, or rhonchi. Abdominal/GI: BS normal, Soft, non-tender, non-distended. No rebound or guarding. Musculoskeletal: No gross injuries or deformities. Neurological: Awake and oriented to self and place. Psych: Calm, not agitated   Skin: Skin is warm and dry. No rash noted. Data Review:   No results found for this or any previous visit (from the past 24 hour(s)). MRI Brain 12/3:  IMPRESSION: Cerebral atrophy. Right posterior parietal encephalomalacia. Periventricular/subcortical white matter gliosis. Evidence for advanced nonacute end vessel occlusive change.     Diffusion-weighted images demonstrate no signal abnormality/diffusion  restriction to suggest the presence of acute vascular insult. Assessment and Plan :     --Seizures : keppra 500mg BID. Seizures precaution. No seizures reported here overnight. --No acute stroke cva per mri- result above reviewed.     --HTN urgency: remains uncontrolled, amlodipine 10 mg daily, metoprolol 100 mg bid, Hydralazine increased 50 mg tid, Losartan 100 mg daily. Prn labetolol. --H/o CVA    --RENAN: s/t prerenal. Cr improved. --Hypokalemia: repleted, follow    --Possible Pneumonia: azithromycin x 5 days. Afebrile. Resp status stable on 2 l/m. Covidd not detected. PLAN:  IRF when ready, no iv meds. BP remains uncontrolled    DVT ppx: heparin SubQ, pending home meds.      Signed By: Candelaria Ramires MD     December 8, 2020

## 2020-12-09 LAB
ANION GAP SERPL CALC-SCNC: 4 MMOL/L (ref 5–15)
BASOPHILS # BLD: 0 K/UL (ref 0–0.1)
BASOPHILS NFR BLD: 1 % (ref 0–1)
BUN SERPL-MCNC: 24 MG/DL (ref 6–20)
BUN/CREAT SERPL: 24 (ref 12–20)
CA-I BLD-MCNC: 9.2 MG/DL (ref 8.5–10.1)
CHLORIDE SERPL-SCNC: 116 MMOL/L (ref 97–108)
CO2 SERPL-SCNC: 30 MMOL/L (ref 21–32)
CREAT SERPL-MCNC: 1.01 MG/DL (ref 0.55–1.02)
DIFFERENTIAL METHOD BLD: ABNORMAL
EOSINOPHIL # BLD: 0.1 K/UL (ref 0–0.4)
EOSINOPHIL NFR BLD: 3 % (ref 0–7)
ERYTHROCYTE [DISTWIDTH] IN BLOOD BY AUTOMATED COUNT: 16.3 % (ref 11.5–14.5)
GLUCOSE SERPL-MCNC: 84 MG/DL (ref 65–100)
HCT VFR BLD AUTO: 29.9 % (ref 35–47)
HGB BLD-MCNC: 9 G/DL (ref 11.5–16)
IMM GRANULOCYTES # BLD AUTO: 0 K/UL (ref 0–0.04)
IMM GRANULOCYTES NFR BLD AUTO: 0 % (ref 0–0.5)
LYMPHOCYTES # BLD: 1.2 K/UL (ref 0.8–3.5)
LYMPHOCYTES NFR BLD: 24 % (ref 12–49)
MCH RBC QN AUTO: 23.7 PG (ref 26–34)
MCHC RBC AUTO-ENTMCNC: 30.1 G/DL (ref 30–36.5)
MCV RBC AUTO: 78.9 FL (ref 80–99)
MONOCYTES # BLD: 0.6 K/UL (ref 0–1)
MONOCYTES NFR BLD: 11 % (ref 5–13)
NEUTS SEG # BLD: 3.2 K/UL (ref 1.8–8)
NEUTS SEG NFR BLD: 61 % (ref 32–75)
PLATELET # BLD AUTO: 281 K/UL (ref 150–400)
PMV BLD AUTO: 11.5 FL (ref 8.9–12.9)
POTASSIUM SERPL-SCNC: 3.7 MMOL/L (ref 3.5–5.1)
RBC # BLD AUTO: 3.79 M/UL (ref 3.8–5.2)
SODIUM SERPL-SCNC: 150 MMOL/L (ref 136–145)
WBC # BLD AUTO: 5.1 K/UL (ref 3.6–11)

## 2020-12-09 PROCEDURE — 74011250637 HC RX REV CODE- 250/637: Performed by: STUDENT IN AN ORGANIZED HEALTH CARE EDUCATION/TRAINING PROGRAM

## 2020-12-09 PROCEDURE — 80048 BASIC METABOLIC PNL TOTAL CA: CPT

## 2020-12-09 PROCEDURE — 36415 COLL VENOUS BLD VENIPUNCTURE: CPT

## 2020-12-09 PROCEDURE — 85025 COMPLETE CBC W/AUTO DIFF WBC: CPT

## 2020-12-09 PROCEDURE — 65270000029 HC RM PRIVATE

## 2020-12-09 PROCEDURE — 74011250637 HC RX REV CODE- 250/637: Performed by: INTERNAL MEDICINE

## 2020-12-09 PROCEDURE — 74011250637 HC RX REV CODE- 250/637: Performed by: FAMILY MEDICINE

## 2020-12-09 PROCEDURE — 74011250637 HC RX REV CODE- 250/637: Performed by: HOSPITALIST

## 2020-12-09 RX ADMIN — MELOXICAM 7.5 MG: 7.5 TABLET ORAL at 08:15

## 2020-12-09 RX ADMIN — ASPIRIN 325 MG ORAL TABLET 325 MG: 325 PILL ORAL at 08:15

## 2020-12-09 RX ADMIN — HYDRALAZINE HYDROCHLORIDE 50 MG: 50 TABLET, FILM COATED ORAL at 21:53

## 2020-12-09 RX ADMIN — HYDRALAZINE HYDROCHLORIDE 50 MG: 50 TABLET, FILM COATED ORAL at 08:20

## 2020-12-09 RX ADMIN — TRAZODONE HYDROCHLORIDE 50 MG: 50 TABLET ORAL at 21:53

## 2020-12-09 RX ADMIN — AZITHROMYCIN MONOHYDRATE 500 MG: 500 TABLET ORAL at 08:19

## 2020-12-09 RX ADMIN — ATORVASTATIN CALCIUM 40 MG: 40 TABLET, FILM COATED ORAL at 08:18

## 2020-12-09 RX ADMIN — HYDRALAZINE HYDROCHLORIDE 50 MG: 50 TABLET, FILM COATED ORAL at 16:31

## 2020-12-09 RX ADMIN — LEVETIRACETAM 500 MG: 500 TABLET ORAL at 21:53

## 2020-12-09 RX ADMIN — AMLODIPINE BESYLATE 10 MG: 5 TABLET ORAL at 08:17

## 2020-12-09 RX ADMIN — METOPROLOL TARTRATE 100 MG: 50 TABLET, FILM COATED ORAL at 21:53

## 2020-12-09 RX ADMIN — PANTOPRAZOLE SODIUM 40 MG: 20 TABLET, DELAYED RELEASE ORAL at 08:15

## 2020-12-09 RX ADMIN — METOPROLOL TARTRATE 100 MG: 50 TABLET, FILM COATED ORAL at 08:20

## 2020-12-09 RX ADMIN — LEVETIRACETAM 500 MG: 500 TABLET ORAL at 08:21

## 2020-12-09 RX ADMIN — LOSARTAN POTASSIUM 100 MG: 50 TABLET, FILM COATED ORAL at 08:20

## 2020-12-09 RX ADMIN — ACETAMINOPHEN 650 MG: 325 TABLET, FILM COATED ORAL at 21:53

## 2020-12-09 NOTE — PROGRESS NOTES
STEPAN received a call from Kay with [facility]. She states that the patient has been denied Beula Nones from [facility]. Peer to Peer would need to be completed by 8 am tomorrow morning. Number to call is [phone]. Jo-Ann Rapp [phone]. Reference number is [id number]    Dr. Jaime Garay made aware of peer to peer. STEPAN called and spoke with [facility] [phone]. She states that she doesn't care where she goes as long as she gets what she needs. Choice given for [facility]. Referral sent. Call received from Nahed To with [facility]. She states that they are only taking COVID positive at [facility]. Patient will need to go to [facility]. New referral sent to [facility]. Will upload clinicals. Hope to be able to start auth this afternoon. Will continue to follow for discharge needs.

## 2020-12-09 NOTE — PROGRESS NOTES
Progress note    Subjective:   Chief Complaint : seizure-like activity                                Worsening left sided weakness prior to arrival.   Source of information : EMS, patients grand-daughter. History of present illness:     89F, h.o breast cancer and stroke (prior left sided wekaness) with seizure like activity and left sided weakness prior to arrival.   Being treated for seizures with possible pneumonia. MRI negative for acute stroke but PRES syndrome cannot be ruled out given hi blood pressure. Per grand-daughter, at baseline she is ambulatory with walker. Also has a left sided weakness from stroke in 2019    Alert, cooperative, nad  Denies specific complaints. In regards to bp:  increased metaprolol to 100 BID  Added and titrated to hydralazine 50 TID  Continue amlodipine, losartan. BP trend improved. Past Medical History:   Diagnosis Date    Breast cancer ([facility] [address]     Stroke (cerebrum) ([facility] [address] 2019     History reviewed. No pertinent surgical history. History reviewed. No pertinent family history. Social History     Tobacco Use    Smoking status: Not on file   Substance Use Topics    Alcohol use: Not on file       Prior to Admission medications    Not on File     No Known Allergies          Review of Systems:  Review of Systems   Constitutional: Negative for chills and fever. HENT: Negative. Eyes: Negative. Respiratory: Negative for cough. Cardiovascular: Negative. Gastrointestinal: Negative. Genitourinary: Negative. Musculoskeletal: Negative. Skin: Negative. Neurological: Positive for weakness. Endo/Heme/Allergies: Negative. Psychiatric/Behavioral: Negative. Vitals:     Visit Vitals  BP (!) 156/89   Pulse 90   Temp 97.3 °F (36.3 °C)   Resp 18   Ht 5' 7\" (1.702 m)   Wt 113.4 kg (250 lb)   SpO2 97%   BMI 39.16 kg/m²       Physical Exam:   Constitutional: elderly, nad, looks stated age.  Speaking clearly  Head: Normocephalic and atraumatic. Mouth/Throat: Moist mucous membranes. Eyes: EOMI. No scleral icterus. Neck: Neck supple. Cardiovascular: regular rate and rhythm. Normal heart sounds. No murmur, rub, or gallop. Good pulses throughout. Pulmonary/Chest: No respiratory distress. Clear to auscultation bilaterally. No wheezes, rales, or rhonchi. Abdominal/GI: BS normal, Soft, non-tender, non-distended. No rebound or guarding. Musculoskeletal: No gross injuries or deformities. Neurological: Awake and oriented to self and place. Psych: Calm, not agitated   Skin: Skin is warm and dry. No rash noted. Data Review:   No results found for this or any previous visit (from the past 24 hour(s)). MRI Brain 12/3:  IMPRESSION: Cerebral atrophy. Right posterior parietal encephalomalacia. Periventricular/subcortical white matter gliosis. Evidence for advanced nonacute end vessel occlusive change.     Diffusion-weighted images demonstrate no signal abnormality/diffusion  restriction to suggest the presence of acute vascular insult. Assessment and Plan :     --Seizures : keppra 500mg BID. Seizures precaution. No seizures reported overnight. --No acute stroke cva per mri- result above reviewed.     --HTN urgency: remains uncontrolled, amlodipine 10 mg daily, metoprolol 100 mg bid, Hydralazine increased 50 mg tid, Losartan 100 mg daily. Prn labetolol. Trend is improving.     --H/o CVA    --RENAN: s/t prerenal. Cr improved. --Hypokalemia: repleted, follow    --Possible Pneumonia: azithromycin x 5 days-completed. Afebrile. Resp status stable on 2 l/m. Covid not detected 12/4/20. Otilia Barker PLAN:  IRF when ready, pending auth at [facility]. no iv meds. BP trend is improved.     DVT ppx: heparin SubQ    Signed By: Purvi Alvarez MD     December 9, 2020

## 2020-12-09 NOTE — PROGRESS NOTES
Pt sleeping soundly;lethargic when woke. Pt would slightly open eyes and just reported \"I'm sleepy\" and close eyes. Currently not able to participate with OT at this time. Will continue to follow and attempt at a later time.

## 2020-12-09 NOTE — PROGRESS NOTES
Pt sleeping soundly and difficult to wake up. Pt would slightly open eyes and just reported \"I'm sleepy\". Currently not able to participate with PT right now. Will follow up at another time.

## 2020-12-09 NOTE — ROUTINE PROCESS
Bedside shift report given to Carlos Sun RN oncoming nurse to include sbar, mar, kardex, and recent orders and changes.

## 2020-12-10 ENCOUNTER — APPOINTMENT (OUTPATIENT)
Dept: GENERAL RADIOLOGY | Age: 85
DRG: 100 | End: 2020-12-10
Attending: INTERNAL MEDICINE
Payer: MEDICARE

## 2020-12-10 LAB
ANION GAP SERPL CALC-SCNC: 5 MMOL/L (ref 5–15)
BUN SERPL-MCNC: 24 MG/DL (ref 6–20)
BUN/CREAT SERPL: 24 (ref 12–20)
CA-I BLD-MCNC: 9.7 MG/DL (ref 8.5–10.1)
CHLORIDE SERPL-SCNC: 114 MMOL/L (ref 97–108)
CO2 SERPL-SCNC: 27 MMOL/L (ref 21–32)
CREAT SERPL-MCNC: 0.98 MG/DL (ref 0.55–1.02)
GLUCOSE SERPL-MCNC: 83 MG/DL (ref 65–100)
POTASSIUM SERPL-SCNC: 3.6 MMOL/L (ref 3.5–5.1)
SODIUM SERPL-SCNC: 146 MMOL/L (ref 136–145)

## 2020-12-10 PROCEDURE — 94762 N-INVAS EAR/PLS OXIMTRY CONT: CPT

## 2020-12-10 PROCEDURE — 74011250636 HC RX REV CODE- 250/636: Performed by: INTERNAL MEDICINE

## 2020-12-10 PROCEDURE — 74230 X-RAY XM SWLNG FUNCJ C+: CPT

## 2020-12-10 PROCEDURE — 74011250637 HC RX REV CODE- 250/637: Performed by: STUDENT IN AN ORGANIZED HEALTH CARE EDUCATION/TRAINING PROGRAM

## 2020-12-10 PROCEDURE — 94760 N-INVAS EAR/PLS OXIMETRY 1: CPT

## 2020-12-10 PROCEDURE — 74011250637 HC RX REV CODE- 250/637: Performed by: FAMILY MEDICINE

## 2020-12-10 PROCEDURE — 74011000255 HC RX REV CODE- 255: Performed by: INTERNAL MEDICINE

## 2020-12-10 PROCEDURE — 92611 MOTION FLUOROSCOPY/SWALLOW: CPT

## 2020-12-10 PROCEDURE — 36415 COLL VENOUS BLD VENIPUNCTURE: CPT

## 2020-12-10 PROCEDURE — 77010033678 HC OXYGEN DAILY

## 2020-12-10 PROCEDURE — 65270000029 HC RM PRIVATE

## 2020-12-10 PROCEDURE — 74011250637 HC RX REV CODE- 250/637: Performed by: INTERNAL MEDICINE

## 2020-12-10 PROCEDURE — 80048 BASIC METABOLIC PNL TOTAL CA: CPT

## 2020-12-10 PROCEDURE — 74011250637 HC RX REV CODE- 250/637: Performed by: HOSPITALIST

## 2020-12-10 RX ORDER — DEXTROSE MONOHYDRATE 50 MG/ML
50 INJECTION, SOLUTION INTRAVENOUS CONTINUOUS
Status: DISPENSED | OUTPATIENT
Start: 2020-12-10 | End: 2020-12-11

## 2020-12-10 RX ORDER — MECLIZINE HYDROCHLORIDE 25 MG/1
25 TABLET ORAL
Status: DISCONTINUED | OUTPATIENT
Start: 2020-12-10 | End: 2020-12-15 | Stop reason: HOSPADM

## 2020-12-10 RX ADMIN — LEVETIRACETAM 500 MG: 500 TABLET ORAL at 21:02

## 2020-12-10 RX ADMIN — BARIUM SULFATE 20 ML: 400 SUSPENSION ORAL at 10:00

## 2020-12-10 RX ADMIN — MECLIZINE HYDROCHLORIDE 25 MG: 25 TABLET ORAL at 21:02

## 2020-12-10 RX ADMIN — BARIUM SULFATE 30 ML: 0.81 POWDER, FOR SUSPENSION ORAL at 10:00

## 2020-12-10 RX ADMIN — LEVETIRACETAM 500 MG: 500 TABLET ORAL at 09:50

## 2020-12-10 RX ADMIN — METOPROLOL TARTRATE 100 MG: 50 TABLET, FILM COATED ORAL at 09:50

## 2020-12-10 RX ADMIN — ATORVASTATIN CALCIUM 40 MG: 40 TABLET, FILM COATED ORAL at 09:50

## 2020-12-10 RX ADMIN — HYDRALAZINE HYDROCHLORIDE 50 MG: 50 TABLET, FILM COATED ORAL at 21:03

## 2020-12-10 RX ADMIN — AMLODIPINE BESYLATE 10 MG: 5 TABLET ORAL at 09:50

## 2020-12-10 RX ADMIN — DEXTROSE MONOHYDRATE 50 ML/HR: 50 INJECTION, SOLUTION INTRAVENOUS at 09:51

## 2020-12-10 RX ADMIN — ACETAMINOPHEN 650 MG: 325 TABLET, FILM COATED ORAL at 14:45

## 2020-12-10 RX ADMIN — LOSARTAN POTASSIUM 100 MG: 50 TABLET, FILM COATED ORAL at 09:50

## 2020-12-10 RX ADMIN — TRAZODONE HYDROCHLORIDE 50 MG: 50 TABLET ORAL at 21:02

## 2020-12-10 RX ADMIN — METOPROLOL TARTRATE 100 MG: 50 TABLET, FILM COATED ORAL at 21:02

## 2020-12-10 RX ADMIN — MELOXICAM 7.5 MG: 7.5 TABLET ORAL at 09:53

## 2020-12-10 RX ADMIN — HYDRALAZINE HYDROCHLORIDE 50 MG: 50 TABLET, FILM COATED ORAL at 16:37

## 2020-12-10 RX ADMIN — PANTOPRAZOLE SODIUM 40 MG: 20 TABLET, DELAYED RELEASE ORAL at 09:50

## 2020-12-10 RX ADMIN — ASPIRIN 325 MG ORAL TABLET 325 MG: 325 PILL ORAL at 09:49

## 2020-12-10 RX ADMIN — HYDRALAZINE HYDROCHLORIDE 50 MG: 50 TABLET, FILM COATED ORAL at 09:50

## 2020-12-10 RX ADMIN — AZITHROMYCIN MONOHYDRATE 500 MG: 500 TABLET ORAL at 09:50

## 2020-12-10 RX ADMIN — BARIUM SULFATE 25 ML: 400 PASTE ORAL at 10:00

## 2020-12-10 NOTE — PROGRESS NOTES
SPEECH PATHOLOGY MODIFIED BARIUM SWALLOW STUDY  Patient: Marii Corona (70 y.o. female)  Date: 12/10/2020  Primary Diagnosis: [facility] [R56.9]        Precautions: Seizure, fall and aspiration    ASSESSMENT :  Based on the objective data described below, the patient presents with mild oropharyngeal dysphagia negatively impacted by AMS and alertness. Oral phase c/b slow bolus manipulation, reduced mastication, minimal oral residue and reduced A-P transit. Premature spillage to the level of the pyriforms w/ thin via straw. Remaining consistencies initiate at the level of the vallecula. Overall, mild swallow delay appreciated. Mild reduction in BOT retraction and epiglottic inversion. Deep laryngeal penetration to the level of the VF w/ thin via straw that clears w/ force of the swallow. No subsequent aspiration. Flash trace penetration w/ NTL via sequential straw sips. No pen/asp w/ remaining consistencies. UES is wfl. Slow movement of bolus through proximal esophagus. Cervical osteophytes noted. Patient will benefit from skilled intervention to address the above impairments. Patients rehabilitation potential is considered to be Fair     PLAN :  Recommendations and Planned Interventions:  Rec diet upgrade to ground/NTL w/ free water protocol b/w meals following oral care. ONLY feed when alert, slow rate of intake, no straws, single sips and remain upright 1 hour after PO intakea. Frequency/Duration: Patient will be followed by speech-language pathology 5 times a week to address goals. Discharge Recommendations: Skilled Nursing Facility     SUBJECTIVE:   Patient confused asking about family. OBJECTIVE:     Past Medical History:   Diagnosis Date    Breast cancer ([facility]     Stroke (cerebrum) ([facility] 2019   History reviewed. No pertinent surgical history.      CXR Results  (Last 48 hours)      None              Current Diet:  DIET FULL LIQUID  DIET NUTRITIONAL SUPPLEMENTS  DIET NUTRITIONAL SUPPLEMENTS   Radiologist: Aly Ross  Film Views: Lateral  Patient Position: upright in chair    Video Flouroscopic Procedures  [x] Lateral View   [] A-P View [] Scanned to level of Sternum    [] Seated at 90 deg. [] Other:    Presentation:   [x] Spoon   [x] Cup   [x] Straw   [] Syringe   [x] Consecutive Swallows  [] Other:    Consistencies:   [x] Ba+ liquid   [x] Ba+ liquid (nectar)   [] Ba+ liquid (honey)     [x] Ba+ pudding   [] Ba+ crunched cookie   [x] Ba+ cookie   [] Other:     Results  Dysphagia Present:    [x] Yes  [] No    Ratings of Dysphagia:    [x] Mild  [] Moderate [] Severe    Stages of Breakdown:   [x] Oral [x] Pharyngeal   [] Esophageal    Aspiration: [] Yes    [x] No  [x] At Risk  [] Trace (<10%) [] Significant (>10%):     %  [x] Penetration:  Level of: VF  Cough: [] Yes      [x] No    Motility Problems with:  [] Lip Closure:   [] Sucking:   [x] Mastication:   [x] Bolus Formation:   [x] Bolus Control:  [] A-P Transport:  [x] Posterior Tongue Elevation:  [x] Swallow Response (delayed):  [] Velopharyngeal Closure:  [x] Laryngeal Elevation:  [x] Laryngeal Adduction:  [] Cricopharyngeal Relaxation:  [] Esophageal Peristalsis:  [] Reflux:  [] Other:      Transit Time Delay:  [x] >1 Second  Oral  [x] >1 Second Pharyngeal  [] >20 Second Esophageal     Residuals:  [x] Buccal Cavity   [] Velum/posterior pharyngeal wall  [x] Valleculae  [] Pyriforms    Decreased Tongue Base Retraction?: Yes  Laryngeal Elevation: WFL (within functional limits)  Aspiration/Penetration Score: 4 (Penetration/No residue-Contrast contacts the folds, and is ejected)     Pharyngeal-Esophageal Segment: No impairment  Pharyngeal Dysfunction: Decreased tongue base retraction; Other (comment)(mild reduction in epiglottic inversion)    Oral Phase Severity: Mild  Pharyngeal Phase Severity: Mild      COMMUNICATION/EDUCATION:   Patient's confusion negatively impacts comprehension and carryover of edcuation.      The patients plan of care including findings from MBS, recommendations, planned interventions, and recommended diet changes were discussed with: Registered nurse and Physician. Patient is unable to participate in goal setting and plan of care. Thank you for this referral.  Tripp Goodman M.S., MRd., CCC-SLP  Time Calculation: 29 mins    Problem: Dysphagia (Adult)  Goal: *Acute Goals and Plan of Care (Insert Text)  Description: Speech Therapy Swallow Goals  Initiated 12/3/2020  -Patient will tolerate full liquid diet with nectar thick liquids without clinical indicators of aspiration given moderate cues within 7 day(s). [ ] Not met  [ x]  MET   [ ] Progressing  [ ] Phan Ramirez  -Patient will tolerate PO trials without clinical indicators of aspiration given minimal cues within 7 day(s). [ ] Not met  [ ]  MET   [ ] Progressing  [ ] Phan Ramirez  -Patient will participate in modified barium swallow study within 7  day(s). [ ] Not met  [ x]  MET   [ ] Progressing  [ ] Phan Ramirez  -Patient will demonstrate understanding of swallow safety precautions and aspiration precautions, diet recs with min cues within 7 day(s).         [ ] Not met  [ ]  MET   [ ] Progressing  [ ] Discontinue      Outcome: Progressing Towards Goal No

## 2020-12-10 NOTE — PROGRESS NOTES
Progress note    Subjective:   Chief Complaint : seizure-like activity                                Worsening left sided weakness prior to arrival.   Source of information : EMS, patients grand-daughter. History of present illness:     89F, h.o breast cancer and stroke (prior left sided wekaness) with seizure like activity and left sided weakness prior to arrival.   Being treated for seizures with possible pneumonia. MRI negative for acute stroke but PRES syndrome cannot be ruled out given hi blood pressure  Per grand-daughter, at baseline she is ambulatory with walker. Also has a left sided weakness from stroke in 2019    : alert, pleasantly confused aao x 2. Denies specific complaints. BP remains uncontrolled.  Atul Nichole HT: increase metaprolol to 100 BID  Add hydralazine 25 TID  Continue amlodipine, losartan. Past Medical History:   Diagnosis Date    [facility] ([facility] [address]     Stroke (cerebrum) ([facility] [address] 2019     History reviewed. No pertinent surgical history. History reviewed. No pertinent family history. Social History     Tobacco Use    Smoking status: Not on file   Substance Use Topics    Alcohol use: Not on file       Prior to Admission medications    Not on File     No Known Allergies          Review of Systems:  Review of Systems   Constitutional: Negative for chills and fever. HENT: Negative. Eyes: Negative. Respiratory: Positive for cough. Cardiovascular: Negative. Gastrointestinal: Negative. Genitourinary: Negative. Musculoskeletal: Negative. Skin: Negative. Neurological: Positive for weakness. Endo/Heme/Allergies: Negative. Psychiatric/Behavioral: Negative. Vitals:     Visit Vitals  BP (!) 146/57 (BP 1 Location: Right arm, BP Patient Position: At rest)   Pulse 73   Temp 97.7 °F (36.5 °C)   Resp 18   Ht 5' 7\" (1.702 m)   Wt 113.4 kg (250 lb)   SpO2 95%   BMI 39.16 kg/m²       Physical Exam:   Constitutional: elderly, nad, looks stated age. Speaking clearly  Head: Normocephalic and atraumatic. Mouth/Throat: Moist mucous membranes. Eyes: EOMI. No scleral icterus. Neck: Neck supple. Cardiovascular: regular rate and rhythm. Normal heart sounds. No murmur, rub, or gallop. Good pulses throughout. Pulmonary/Chest: No respiratory distress. Clear to auscultation bilaterally. No wheezes, rales, or rhonchi. Abdominal/GI: BS normal, Soft, non-tender, non-distended. No rebound or guarding. Musculoskeletal: No gross injuries or deformities. Neurological: Awake and oriented to self and place. Psych: Calm, not agitated   Skin: Skin is warm and dry. No rash noted. Data Review:   Recent Results (from the past 24 hour(s))   METABOLIC PANEL, BASIC    Collection Time: 12/09/20  4:38 PM   Result Value Ref Range    Sodium 150 (H) 136 - 145 mmol/L    Potassium 3.7 3.5 - 5.1 mmol/L    Chloride 116 (H) 97 - 108 mmol/L    CO2 30 21 - 32 mmol/L    Anion gap 4 (L) 5 - 15 mmol/L    Glucose 84 65 - 100 mg/dL    BUN 24 (H) 6 - 20 mg/dL    Creatinine 1.01 0.55 - 1.02 mg/dL    BUN/Creatinine ratio 24 (H) 12 - 20      GFR est AA >60 >60 ml/min/1.73m2    GFR est non-AA 52 (L) >60 ml/min/1.73m2    Calcium 9.2 8.5 - 10.1 mg/dL   CBC WITH AUTOMATED DIFF    Collection Time: 12/09/20  4:38 PM   Result Value Ref Range    WBC 5.1 3.6 - 11.0 K/uL    RBC 3.79 (L) 3.80 - 5.20 M/uL    HGB 9.0 (L) 11.5 - 16.0 g/dL    HCT 29.9 (L) 35.0 - 47.0 %    MCV 78.9 (L) 80.0 - 99.0 FL    MCH 23.7 (L) 26.0 - 34.0 PG    MCHC 30.1 30.0 - 36.5 g/dL    RDW 16.3 (H) 11.5 - 14.5 %    PLATELET 441 803 - 132 K/uL    MPV 11.5 8.9 - 12.9 FL    NEUTROPHILS 61 32 - 75 %    LYMPHOCYTES 24 12 - 49 %    MONOCYTES 11 5 - 13 %    EOSINOPHILS 3 0 - 7 %    BASOPHILS 1 0 - 1 %    IMMATURE GRANULOCYTES 0 0.0 - 0.5 %    ABS. NEUTROPHILS 3.2 1.8 - 8.0 K/UL    ABS. LYMPHOCYTES 1.2 0.8 - 3.5 K/UL    ABS. MONOCYTES 0.6 0.0 - 1.0 K/UL    ABS. EOSINOPHILS 0.1 0.0 - 0.4 K/UL    ABS. BASOPHILS 0.0 0.0 - 0.1 K/UL    ABS. IMM. Shameka Mark. 0.0 0.00 - 0.04 K/UL    DF AUTOMATED           MRI Brain 12/3:  IMPRESSION: Cerebral atrophy. Right posterior parietal encephalomalacia. Periventricular/subcortical white matter gliosis. Evidence for advanced nonacute end vessel occlusive change.     Diffusion-weighted images demonstrate no signal abnormality/diffusion  restriction to suggest the presence of acute vascular insult. Assessment and Plan :     --Seizures : keppra 500mg BID. Seizures precaution. No seizures reported here overnight. --No acute stroke cva per mri- result above reviewed.     --HTN urgency: remains uncontrolled, amlodipine 10 mg daily, metoprolol 100 mg bid, Hydralazine increased 50 mg tid, Losartan 100 mg daily. Prn labetolol. --H/o CVA + left side weak, chronic but acutely    --RENAN: s/t prerenal. Cr improved. --Hypokalemia: repleted, follow, am labs pending    --Hypernatremia na     --Possible Pneumonia: azithromycin x 5 days. Afebrile. Resp status stable on 2 l/m. Covidd not detected. --Oral pharyngeal dysphagia: ST following, tolerating WFL, NTL, aspiration/gerd precautions, advance safely as tolerated, st following for further recs. MBS 12/10    --Covid Not Detected 12/4    PLAN:  IRF vs snf when ready, no iv meds. DVT ppx: heparin SubQ, pending home meds.      Signed By: Patience Williamson MD     December 10, 2020

## 2020-12-11 LAB
ANION GAP SERPL CALC-SCNC: 5 MMOL/L (ref 5–15)
BUN SERPL-MCNC: 26 MG/DL (ref 6–20)
BUN/CREAT SERPL: 27 (ref 12–20)
CA-I BLD-MCNC: 9.1 MG/DL (ref 8.5–10.1)
CHLORIDE SERPL-SCNC: 109 MMOL/L (ref 97–108)
CO2 SERPL-SCNC: 26 MMOL/L (ref 21–32)
CREAT SERPL-MCNC: 0.98 MG/DL (ref 0.55–1.02)
GLUCOSE SERPL-MCNC: 140 MG/DL (ref 65–100)
POTASSIUM SERPL-SCNC: 3.5 MMOL/L (ref 3.5–5.1)
SODIUM SERPL-SCNC: 140 MMOL/L (ref 136–145)

## 2020-12-11 PROCEDURE — 80048 BASIC METABOLIC PNL TOTAL CA: CPT

## 2020-12-11 PROCEDURE — 74011250637 HC RX REV CODE- 250/637: Performed by: STUDENT IN AN ORGANIZED HEALTH CARE EDUCATION/TRAINING PROGRAM

## 2020-12-11 PROCEDURE — 36415 COLL VENOUS BLD VENIPUNCTURE: CPT

## 2020-12-11 PROCEDURE — 92526 ORAL FUNCTION THERAPY: CPT

## 2020-12-11 PROCEDURE — 74011250637 HC RX REV CODE- 250/637: Performed by: HOSPITALIST

## 2020-12-11 PROCEDURE — 74011250637 HC RX REV CODE- 250/637: Performed by: FAMILY MEDICINE

## 2020-12-11 PROCEDURE — 65270000029 HC RM PRIVATE

## 2020-12-11 PROCEDURE — 74011250637 HC RX REV CODE- 250/637: Performed by: INTERNAL MEDICINE

## 2020-12-11 RX ADMIN — TRAZODONE HYDROCHLORIDE 50 MG: 50 TABLET ORAL at 21:17

## 2020-12-11 RX ADMIN — PANTOPRAZOLE SODIUM 40 MG: 20 TABLET, DELAYED RELEASE ORAL at 05:29

## 2020-12-11 RX ADMIN — LEVETIRACETAM 500 MG: 500 TABLET ORAL at 10:00

## 2020-12-11 RX ADMIN — ASPIRIN 325 MG ORAL TABLET 325 MG: 325 PILL ORAL at 10:00

## 2020-12-11 RX ADMIN — HYDRALAZINE HYDROCHLORIDE 50 MG: 50 TABLET, FILM COATED ORAL at 21:17

## 2020-12-11 RX ADMIN — ACETAMINOPHEN 650 MG: 325 TABLET, FILM COATED ORAL at 21:16

## 2020-12-11 RX ADMIN — HYDRALAZINE HYDROCHLORIDE 50 MG: 50 TABLET, FILM COATED ORAL at 10:00

## 2020-12-11 RX ADMIN — LOSARTAN POTASSIUM 100 MG: 50 TABLET, FILM COATED ORAL at 10:00

## 2020-12-11 RX ADMIN — AMLODIPINE BESYLATE 10 MG: 5 TABLET ORAL at 10:00

## 2020-12-11 RX ADMIN — LEVETIRACETAM 500 MG: 500 TABLET ORAL at 21:16

## 2020-12-11 RX ADMIN — METOPROLOL TARTRATE 100 MG: 50 TABLET, FILM COATED ORAL at 21:17

## 2020-12-11 RX ADMIN — METOPROLOL TARTRATE 100 MG: 50 TABLET, FILM COATED ORAL at 10:00

## 2020-12-11 RX ADMIN — MELOXICAM 7.5 MG: 7.5 TABLET ORAL at 10:00

## 2020-12-11 RX ADMIN — ATORVASTATIN CALCIUM 40 MG: 40 TABLET, FILM COATED ORAL at 10:00

## 2020-12-11 NOTE — PROGRESS NOTES
Progress note    Subjective:   Chief Complaint : seizure-like activity                                Worsening left sided weakness prior to arrival.   Source of information : EMS, patients grand-daughter. History of present illness:     89F, h.o breast cancer and stroke (prior left sided wekaness) with seizure like activity and left sided weakness prior to arrival.   Being treated for seizures with possible pneumonia. MRI negative for acute stroke but PRES syndrome cannot be ruled out given hi blood pressure  Per grand-daughter, at baseline she is ambulatory with walker. Also has a left sided weakness from stroke in 2019    alert, pleasantly confused aao x 3. Acknowledges less sharp memory. She does not look distressed and is interacting appropriately  Denies specific complaints. BP trend better        Past Medical History:   Diagnosis Date    Breast cancer ([facility] [address]     Stroke (cerebrum) ([facility] [address] 2019     History reviewed. No pertinent surgical history. History reviewed. No pertinent family history. Social History     Tobacco Use    Smoking status: Not on file   Substance Use Topics    Alcohol use: Not on file       Prior to Admission medications    Not on File     No Known Allergies          Review of Systems:  Review of Systems   Constitutional: Negative for chills and fever. HENT: Negative. Eyes: Negative. Respiratory: Negative for cough. Cardiovascular: Negative. Gastrointestinal: Negative. Genitourinary: Negative. Musculoskeletal: Negative. Skin: Negative. Neurological: Positive for weakness. Endo/Heme/Allergies: Negative. Psychiatric/Behavioral: Negative. Vitals:     Visit Vitals  BP (!) 164/67   Pulse 68   Temp 98.1 °F (36.7 °C)   Resp 18   Ht 5' 7\" (1.702 m)   Wt 113.4 kg (250 lb)   SpO2 99%   BMI 39.16 kg/m²       Physical Exam:   Constitutional: elderly, nad, looks stated age. Speaking clearly  Head: Normocephalic and atraumatic.   Mouth/Throat: Moist mucous membranes. Eyes: EOMI. No scleral icterus. Neck: Neck supple. Cardiovascular: regular rate and rhythm. Normal heart sounds. No murmur, rub, or gallop. Good pulses throughout. Pulmonary/Chest: No respiratory distress. Clear to auscultation bilaterally. No wheezes, rales, or rhonchi. Abdominal/GI: BS normal, Soft, non-tender, non-distended. No rebound or guarding. Musculoskeletal: No gross injuries or deformities. Neurological: Awake and oriented x 2, can't tell me why here, oriented still to place and time and uyear  Psych: Calm, not agitated   Skin: Skin is warm and dry. No rash noted. Data Review:   Recent Results (from the past 24 hour(s))   METABOLIC PANEL, BASIC    Collection Time: 12/11/20  7:40 AM   Result Value Ref Range    Sodium 140 136 - 145 mmol/L    Potassium 3.5 3.5 - 5.1 mmol/L    Chloride 109 (H) 97 - 108 mmol/L    CO2 26 21 - 32 mmol/L    Anion gap 5 5 - 15 mmol/L    Glucose 140 (H) 65 - 100 mg/dL    BUN 26 (H) 6 - 20 mg/dL    Creatinine 0.98 0.55 - 1.02 mg/dL    BUN/Creatinine ratio 27 (H) 12 - 20      GFR est AA >60 >60 ml/min/1.73m2    GFR est non-AA 53 (L) >60 ml/min/1.73m2    Calcium 9.1 8.5 - 10.1 mg/dL         MRI Brain 12/3:  IMPRESSION: Cerebral atrophy. Right posterior parietal encephalomalacia. Periventricular/subcortical white matter gliosis. Evidence for advanced nonacute end vessel occlusive change.     Diffusion-weighted images demonstrate no signal abnormality/diffusion  restriction to suggest the presence of acute vascular insult. Assessment and Plan :     --Seizures : keppra 500mg BID. Seizures precaution. No seizures reported here overnight. --No acute stroke cva per mri- result above reviewed. --Encephalopathy vs dementia, seems inproved though aao x 2.    --HTN urgency: trend improved, amlodipine 10 mg daily, metoprolol 100 mg bid, Hydralazine increased 50 mg tid, Losartan 100 mg daily. Prn labetolol.      --H/o CVA + left side weak, chronic but acutely    --RENAN: s/t prerenal. Cr improved. --Hypokalemia: repleted, follow, am labs pending    --Hypernatremia na     --Possible Pneumonia: azithromycin x 5 days. Afebrile. Resp status stable on 2 l/m. Covidd not detected. --Oral pharyngeal dysphagia: ST following, ground/ntl, free water protocol. Aspiration/gerd precautions, advance safely as tolerated, st following for further recs. --Covid Not Detected 12/4    PLAN:  IRF vs snf when ready, no iv meds. DVT ppx: heparin SubQ, pending home meds.      Signed By: Luis Krishnan MD     December 11, 2020

## 2020-12-11 NOTE — PROGRESS NOTES
SPEECH LANGUAGE PATHOLOGY DYSPHAGIA TREATMENT  Patient: Jose Juan Roper (30 y.o. female)  Date: 12/11/2020  Diagnosis: Seizure ([facility] [R56.9]     Precautions: Seizure, fall and aspiration     ASSESSMENT :  The patient presents with mild oropharyngeal dysphagia. Which continues to be negatively impacted by AMS and alertness. Patient benefited from frequent redirection to task s/t cognitive impairments. Oral phase c/b reduced bolus awareness, slow bolus manipulation, reduced mastication, and reduced A-P transit. Pharyngeal phase c/b mild swallow delay discoordinate at times and HLE is wlf upon palpation. Overt s/s of pen/asp w/ sequential NTL trials. She declined swallow strengthening exercises, appeared s/t poor comprehension of tasks and fatigue. Patient will benefit from skilled intervention to address the above impairments. Patients rehabilitation potential is considered to be Fair      PLAN :  Recommendations and Planned Interventions:  Rec cont ground/NTL w/ free water protocol b/w meals following oral care. ONLY feed when alert, slow rate of intake, no straws, single sips and remain upright 1 hour after PO intakea. Frequency/Duration: Patient will be followed by speech-language pathology 5 times a week to address goals. Discharge Recommendations: Skilled Nursing Facility     SUBJECTIVE:   Patient confused asking \"why am I the only one like this in the bed\". OBJECTIVE:     Past Medical History:   Diagnosis Date    Breast cancer ([facility]     Stroke (cerebrum) ([facility] 2019       CXR Results  (Last 48 hours)      None            Current Diet:  DIET NUTRITIONAL SUPPLEMENTS  DIET NUTRITIONAL SUPPLEMENTS  DIET CARDIAC     Cognitive and Communication Status:  Neurologic State: Confused, Drowsy  Orientation Level: Oriented to person  Cognition: Decreased attention/concentration, Decreased command following, Impaired decision making           Dysphagia Treatment:  Oral Assessment:     P.O.  Trials:  Patient Position: upright in bed  Vocal quality prior to P.O.: Hoarse  Consistency Presented: Nectar thick liquid;Puree; Solid; Thin liquid  How Presented: SLP-fed/presented;Cup/sip;Spoon; Successive swallows     Bolus Acceptance: No impairment  Bolus Formation/Control: Impaired  Type of Impairment: Delayed;Lip closure;Mastication  Propulsion: Delayed (# of seconds); Discoordination  Oral Residue: None  Initiation of Swallow: Delayed (# of seconds)  Laryngeal Elevation: Functional  Aspiration Signs/Symptoms: Strong cough  Pharyngeal Phase Characteristics: Easily fatigued              Oral Phase Severity: Mild  Pharyngeal Phase Severity : Mild    Pain:  Pain Scale 1: Numeric (0 - 10)  Pain Intensity 1: 0       After treatment:   Patient left in no apparent distress in bed, Call bell within reach, and Nursing notified     COMMUNICATION/EDUCATION:   Patient's confusion and alertness negatively impact swallow fxn. The patient's plan of care including recommendations, planned interventions, and recommended diet changes were discussed with: Registered nurse and Physician. Thank you for this referral.  Mickey Del Angel M.S., M.Ed., CCC-SLP  Time Calculation: 13 mins    Problem: Dysphagia (Adult)  Goal: *Acute Goals and Plan of Care (Insert Text)  Description: Speech Therapy Swallow Goals  Initiated 12/3/2020  -Patient will tolerate full liquid diet with nectar thick liquids without clinical indicators of aspiration given moderate cues within 7 day(s). [ ] Not met  [ x]  MET   [ ] Progressing  [ ] Lara Khan  -Patient will tolerate PO trials without clinical indicators of aspiration given minimal cues within 7 day(s). [ ] Not met  [ ]  MET   [ ] Progressing  [ ] Lara Khan  -Patient will participate in modified barium swallow study within 7  day(s).          [ ] Not met  [ x]  MET   [ ] Progressing  [ ] Lara Khan  -Patient will demonstrate understanding of swallow safety precautions and aspiration precautions, diet recs with min cues within 7 day(s).         [ ] Not met  [ ]  MET   [ ] Progressing  [ ] Discontinue      Outcome: Progressing Towards Goal

## 2020-12-11 NOTE — PROGRESS NOTES
Progress note Subjective: Chief Complaint : seizure-like activity Worsening left sided weakness prior to arrival.  
Source of information : EMS, patients grand-daughter. History of present illness:  
 
89F, h.o breast cancer and stroke (prior left sided wekaness) with seizure like activity and left sided weakness prior to arrival.  
Being treated for seizures with possible pneumonia. MRI negative for acute stroke but PRES syndrome cannot be ruled out given hi blood pressure Per grand-daughter, at baseline she is ambulatory with walker. Also has a left sided weakness from stroke in 2019 
 
pleasantly confused aao x 2. Denies specific complaints. BP trend better Past Medical History:  
Diagnosis Date  Breast cancer ([facility] [address]  Stroke (cerebrum) ([facility] [address] 2019 History reviewed. No pertinent surgical history. History reviewed. No pertinent family history. Social History Tobacco Use  Smoking status: Not on file Substance Use Topics  Alcohol use: Not on file Prior to Admission medications Not on File No Known Allergies Review of Systems: 
Review of Systems Constitutional: Negative for chills and fever. HENT: Negative. Eyes: Negative. Respiratory: Positive for cough. Cardiovascular: Negative. Gastrointestinal: Negative. Genitourinary: Negative. Musculoskeletal: Negative. Skin: Negative. Neurological: Positive for weakness. Endo/Heme/Allergies: Negative. Psychiatric/Behavioral: Negative. Vitals:  
 
Visit Vitals BP (!) 164/67 Pulse 68 Temp 98.1 °F (36.7 °C) Resp 18 Ht 5' 7\" (1.702 m) Wt 113.4 kg (250 lb) SpO2 99% BMI 39.16 kg/m² Physical Exam:  
Constitutional: elderly, nad, looks stated age. Speaking clearly Head: Normocephalic and atraumatic. Mouth/Throat: Moist mucous membranes. Eyes: EOMI. No scleral icterus. Neck: Neck supple. Cardiovascular: regular rate and rhythm. Normal heart sounds. No murmur, rub, or gallop. Good pulses throughout. Pulmonary/Chest: No respiratory distress. Clear to auscultation bilaterally. No wheezes, rales, or rhonchi. Abdominal/GI: BS normal, Soft, non-tender, non-distended. No rebound or guarding. Musculoskeletal: No gross injuries or deformities. Neurological: Awake and oriented to self and place. Psych: Calm, not agitated Skin: Skin is warm and dry. No rash noted. Data Review:  
Recent Results (from the past 24 hour(s)) METABOLIC PANEL, BASIC Collection Time: 12/11/20  7:40 AM  
Result Value Ref Range Sodium 140 136 - 145 mmol/L Potassium 3.5 3.5 - 5.1 mmol/L Chloride 109 (H) 97 - 108 mmol/L  
 CO2 26 21 - 32 mmol/L Anion gap 5 5 - 15 mmol/L Glucose 140 (H) 65 - 100 mg/dL BUN 26 (H) 6 - 20 mg/dL Creatinine 0.98 0.55 - 1.02 mg/dL BUN/Creatinine ratio 27 (H) 12 - 20 GFR est AA >60 >60 ml/min/1.73m2 GFR est non-AA 53 (L) >60 ml/min/1.73m2 Calcium 9.1 8.5 - 10.1 mg/dL MRI Brain 12/3: 
IMPRESSION: Cerebral atrophy. Right posterior parietal encephalomalacia. Periventricular/subcortical white matter gliosis. Evidence for advanced nonacute end vessel occlusive change. 
  
Diffusion-weighted images demonstrate no signal abnormality/diffusion 
restriction to suggest the presence of acute vascular insult. Assessment and Plan :  
 
--Seizures : keppra 500mg BID. Seizures precaution. No seizures reported here overnight. --No acute stroke cva per mri- result above reviewed.  
 
--HTN urgency: remains uncontrolled, amlodipine 10 mg daily, metoprolol 100 mg bid, Hydralazine increased 50 mg tid, Losartan 100 mg daily. Prn labetolol. --H/o CVA + left side weak, chronic but acutely 
 
--RENAN: s/t prerenal. Cr improved. --Hypokalemia: repleted, follow, bmp am 
 
--Hypernatremia na --Possible Pneumonia: azithromycin x 5 days. Afebrile. Resp status stable on 2 l/m. Covid not detected. --Oral pharyngeal dysphagia: ST following, tolerating WFL, NTL, aspiration/gerd precautions, advance safely as tolerated, st following for further recs. ST following. --Covid Not Detected 12/4 PLAN: 
[facility] pending DVT ppx: heparin SubQ, Signed By: Bianca Romero MD   
 December 11, 2020

## 2020-12-11 NOTE — PROGRESS NOTES
Dr. Michelle Hassan notified of patient's c/o feeling dizzy at rest and with movement.   Orders placed by MD.

## 2020-12-11 NOTE — PROGRESS NOTES
Bedside and Verbal shift change report given to Nancy Mireles RN (oncoming nurse) by Agnes Mathur LPN (offgoing nurse). Report included the following information SBAR, Kardex, MAR and Recent Results.

## 2020-12-12 LAB
GLUCOSE BLD STRIP.AUTO-MCNC: 138 MG/DL (ref 65–100)
GLUCOSE BLD STRIP.AUTO-MCNC: 204 MG/DL (ref 65–100)
PERFORMED BY, TECHID: ABNORMAL
PERFORMED BY, TECHID: ABNORMAL

## 2020-12-12 PROCEDURE — 92526 ORAL FUNCTION THERAPY: CPT

## 2020-12-12 PROCEDURE — 82962 GLUCOSE BLOOD TEST: CPT

## 2020-12-12 PROCEDURE — 74011250637 HC RX REV CODE- 250/637: Performed by: HOSPITALIST

## 2020-12-12 PROCEDURE — 74011250637 HC RX REV CODE- 250/637: Performed by: STUDENT IN AN ORGANIZED HEALTH CARE EDUCATION/TRAINING PROGRAM

## 2020-12-12 PROCEDURE — 97530 THERAPEUTIC ACTIVITIES: CPT

## 2020-12-12 PROCEDURE — 74011250637 HC RX REV CODE- 250/637: Performed by: INTERNAL MEDICINE

## 2020-12-12 PROCEDURE — 65270000029 HC RM PRIVATE

## 2020-12-12 RX ADMIN — METOPROLOL TARTRATE 100 MG: 50 TABLET, FILM COATED ORAL at 08:16

## 2020-12-12 RX ADMIN — LEVETIRACETAM 500 MG: 500 TABLET ORAL at 22:23

## 2020-12-12 RX ADMIN — HYDRALAZINE HYDROCHLORIDE 50 MG: 50 TABLET, FILM COATED ORAL at 22:23

## 2020-12-12 RX ADMIN — LOSARTAN POTASSIUM 100 MG: 50 TABLET, FILM COATED ORAL at 08:16

## 2020-12-12 RX ADMIN — TRAZODONE HYDROCHLORIDE 50 MG: 50 TABLET ORAL at 22:23

## 2020-12-12 RX ADMIN — METOPROLOL TARTRATE 100 MG: 50 TABLET, FILM COATED ORAL at 22:23

## 2020-12-12 RX ADMIN — LEVETIRACETAM 500 MG: 500 TABLET ORAL at 08:16

## 2020-12-12 RX ADMIN — PANTOPRAZOLE SODIUM 40 MG: 20 TABLET, DELAYED RELEASE ORAL at 05:09

## 2020-12-12 RX ADMIN — AMLODIPINE BESYLATE 10 MG: 5 TABLET ORAL at 08:16

## 2020-12-12 RX ADMIN — ASPIRIN 325 MG ORAL TABLET 325 MG: 325 PILL ORAL at 08:15

## 2020-12-12 RX ADMIN — HYDRALAZINE HYDROCHLORIDE 50 MG: 50 TABLET, FILM COATED ORAL at 08:15

## 2020-12-12 RX ADMIN — MELOXICAM 7.5 MG: 7.5 TABLET ORAL at 08:16

## 2020-12-12 RX ADMIN — ATORVASTATIN CALCIUM 40 MG: 40 TABLET, FILM COATED ORAL at 08:16

## 2020-12-12 RX ADMIN — HYDRALAZINE HYDROCHLORIDE 50 MG: 50 TABLET, FILM COATED ORAL at 15:37

## 2020-12-12 NOTE — PROGRESS NOTES
Problem: Self Care Deficits Care Plan (Adult)  Goal: *Acute Goals and Plan of Care (Insert Text)  Description: Pt will be SBA sup <> sit in prep for EOB ADLs  Pt will be SBA grooming sitting EOB  Pt will be min A LE dressing sitting EOB/long sit  Pt will be min A sit <>  prep for toileting LRAD  Pt will be min A toileting/toilet transfer/cloth mgmt LRAD  Pt will be SPV following UE HEP in prep for self care tasks      Outcome: Progressing Towards Goal     OCCUPATIONAL THERAPY RE-EVALUATION  Patient: Ezio Albarran (42 y.o. female)  Date: 12/12/2020  Diagnosis: Seizure ([facility] [address] [R56.9]   <principal problem not specified>       Precautions:  Falls  Chart, occupational therapy assessment, plan of care, and goals were reviewed. ASSESSMENT  Patient is 81 y/o female came to [facility] s/p seizure like activity and worsening left sided weakness prior to arrival and adm 12/2/2020 for partial seizure vs complex migraine, R/O CVA, HTN urgency, RENAN, hypokalemia, pneumonia, COVID negative (12/4/2020), oral pharyngeal dysphagia, encephalopathy vs dementia. MRI negative for CVA however stating PRES syndrome cannot be ruled out 2/2 BP (per Dr. Driver Parents note). Pt has hx of breast cancer, CVA (2019). Per CM note, pt lives in her own home and has 24/7 supervision/assist and requires assist for self care and per chart review pt ambulated using RW. Pt received semi supine in bed, A&O to self and year (believes it is October and she is at a home) and agreeable for OT/PT RA.  Pt has completed three treatment sessions with OT since initial eval and demonstrating slow progress towards goals and demonstrating overall increased LUE strength compared to eval.    Pt currently presents with decreased balance, decreased activity tolerance, decreased safety awareness, generalized weakness (LUE weaker and limited AROM compared to RUE) and increased need for assist withself care (min simple grooming simulated EOB level, dep LB dressing bed level and total A toileting simulated bed level) and functional transfers/mobility (mod A rolling bed level, mod Ax2 sup->sit, min Ax2 sit->sup, max A scooting EOB level, max A progressing to SBA for seated EOB balance in prep for self care tasks, max Ax2 sit<->stand and maintain stance 2/2 posterior lean). While seated EOB, pt bearing weight through marianne UE's to maintain balance and demonstrated posterio lateral lean to left at times however able to self correct with CGA and verbal cues. Pt would benefit from skilled OT services while at [facility] in order to increase safety and independence with self care and functional transfers/mobility. Recommend discharge to SNF when medically appropriate. Other factors to consider for discharge: PLOF, severity of deficits         PLAN :  Recommendations and Planned Interventions: self care training, functional mobility training, therapeutic exercise, balance training, cognitive retraining, endurance activities, patient education, and home safety training    Frequency/Duration: Patient will be followed by occupational therapy 5 times a week to address goals. Recommend with staff: bedpan use    Recommend next OT session: [facility] transfer    Recommendation for discharge: (in order for the patient to meet his/her long term goals)  SNF    This discharge recommendation:  Has been made in collaboration with the attending provider and/or case management    Equipment recommendations for successful discharge (if) home: TBD       SUBJECTIVE:   Patient stated I am in a home.     OBJECTIVE DATA SUMMARY:   Hospital course since last seen and reason for reevaluation: LOS    Cognitive/Behavioral Status:  Neurologic State: Alert;Confused;Drowsy  Orientation Level: Disoriented to place; Disoriented to situation;Disoriented to time;Oriented to person(oriented to year not month)  Cognition: Other (comment)(Fluctuating)     Range of Motion:    AROM: Generally decreased, functional  PROM: Generally decreased, functional     Strength:    Strength: Generally decreased, functional     RUE Strength  Observation: grossly observed to be 3+/5     LUE Strength  Observation: grossly 2/5    Functional Mobility and Transfers for ADLs:  Bed Mobility:  Rolling: Moderate assistance  Supine to Sit: Moderate assistance;Assist x2  Sit to Supine: Minimum assistance;Assist x2  Scooting: Maximum assistance    Transfers:  Sit to Stand: Maximum assistance;Assist x2       Balance:  Sitting: Impaired; With support  Sitting - Static: Fair (occasional)  Sitting - Dynamic: Fair (occasional)  Standing: Impaired;Pull to stand; With support  Standing - Static: Constant support;Poor    ADL Assessment:     Oral Facial Hygiene/Grooming: Minimum assistance    Lower Body Dressing: Total assistance    Toileting: Total assistance     ADL Intervention and task modifications:     Grooming  Grooming Assistance: Minimum assistance  Position Performed: Seated edge of bed    Lower Body Dressing Assistance  Antiembolitic Stockings: Total assistance (dependent)(simulated)    Toileting  Toileting Assistance: Total assistance(dependent)(simulated bed  leel)     Pain:  Back pain reported with activity however pt unable to quantify pain and reporting it hurts \"a little\"    Activity Tolerance:   Fair and requires rest breaks  Please refer to the flowsheet for vital signs taken during this treatment. After treatment patient left in no apparent distress:   Supine in bed, Call bell within reach, Bed / chair alarm activated, and Side rails x 3    COMMUNICATION/COLLABORATION:   The patients plan of care was discussed with: Physical therapist and Registered nurse. PT/OT sessions occurred together for increased safety of pt and clinician.       Princess Morfin  Time Calculation: 20 mins

## 2020-12-12 NOTE — PROGRESS NOTES
Patient: Elías Mckeon (77 y.o. female)  Date: 12/12/2020  Diagnosis: Seizure ([facility] [address] [R56.9]   <principal problem not specified>       Precautions:  Aspiration/GERD    ASSESSMENT :  Pt repositioned upright. Oral care completed prior to administering po trials of ice chips and thin from tsp. Pt expectorates thin liquid trial 1/7. Pt with reduced awareness of bolus and dis-coordinated oral phase with ice chips. Pt with mild bolus holding and requires verbal cueing with all trials to initiate swallow and HLE appears weak/reduced upon palpation. Concerns for aspiration given fluctuating alertness and intermittently confusion. Hold free [place] water protocol at this time. Patient will benefit from skilled intervention to address the above impairments. Patients rehabilitation potential is considered to be Good. PLAN :  Recommendations and Planned Interventions:  Continue nectar/ground-D2 with STRICT aspiration and GERD precautions advised. Monitor for overt s/sx of aspiration. 1:1 assist with all po intake given fluctuating status. Upright positioning for at least 1 hr post po intake. No straws. Only provided po intake when pt fully alert and receptive. ST to follow. Hold Free [place] water protocol at this time s/t to confusion. Frequency/Duration: Patient will be followed by speech-language pathology 5 times a week to address goals. Discharge Recommendations: To Be Determined     SUBJECTIVE:   Patient reports \"I did not sleep well last night. \" Pt with fluctuating alertness and presenting as confused.  RN reports poor po intake this AM.     OBJECTIVE:     Past Medical History:   Diagnosis Date    Breast cancer ([facility] [address]     [facility]       CXR Results  (Last 48 hours)      None            Current Diet:  DIET NUTRITIONAL SUPPLEMENTS  DIET NUTRITIONAL SUPPLEMENTS  DIET CARDIAC     Cognitive and Communication Status:  Neurologic State: Drowsy  Orientation Level: Oriented to person  Cognition: Other (comment)(Fluctuating)     Dysphagia Treatment:  See narrative above  Patient Position: Repositioned upright  Vocal quality prior to P.O.:    Consistency Presented: Ice chips; Thin liquid  How Presented: Spoon     Bolus Acceptance: Impaired  Bolus Formation/Control: (expectoring thin x1)     Propulsion: Discoordination     Initiation of Swallow: Delayed (# of seconds)  Laryngeal Elevation: Decreased;Weak  Aspiration Signs/Symptoms: None    After treatment:   Patient left in no apparent distress in bed    COMMUNICATION/EDUCATION:   Patient was educated regarding her deficit(s) of dysphagia and requires additional training and education given cognitive status. The patient's plan of care including recommendations, planned interventions, and recommended diet changes were discussed with: Registered nurse. Patient is unable to participate in goal setting and plan of care. Thank you for this referral.  Dominique Jimenez M.S., CCC-SLP  Time Calculation: 8 mins   Problem: Dysphagia (Adult)  Goal: *Acute Goals and Plan of Care (Insert Text)  Description: Speech Therapy Swallow Goals  Initiated 12/3/2020  -Patient will tolerate full liquid diet with nectar thick liquids without clinical indicators of aspiration given moderate cues within 7 day(s). [ ] Not met  [ x]  MET   [ ] Progressing  [ ] Karishma Hernandez  -Patient will tolerate PO trials without clinical indicators of aspiration given minimal cues within 7 day(s). [ ] Not met  [ ]  MET   [x] Progressing  [ ] Karishma Hernandez  -Patient will participate in modified barium swallow study within 7  day(s). [ ] Not met  [ x]  MET   [ ] Progressing  [ ] Karishma Hernandez  -Patient will demonstrate understanding of swallow safety precautions and aspiration precautions, diet recs with min cues within 7 day(s).         [ ] Not met  [ ]  MET   [x] Progressing  [ ] Discontinue      Outcome: Progressing Towards Goal     Problem: Patient Education: Go to Patient Education Activity  Goal: Patient/Family Education  Outcome: Progressing Towards Goal

## 2020-12-12 NOTE — PROGRESS NOTES
Bedside shift change report given to Brianna Winters RN (oncoming nurse) by Jose Mitchell RN (offgoing nurse). Report included the following information SBAR, Intake/Output, MAR and Recent Results.

## 2020-12-12 NOTE — PROGRESS NOTES
Problem: Mobility Impaired (Adult and Pediatric)  Goal: *Acute Goals and Plan of Care (Insert Text)  Description: Pt will be I with LE HEP in 7 days. Pt will perform bed mobility with min A x1 in 7 days. Pt will perform transfers with min A x1 in 7 days. Pt will amb 10-25 feet with LRAD safely with mod A x1 in 7 days. Outcome: Progressing Towards Goal  PHYSICAL THERAPY REEVALUATION  Patient: Ander Duverney (37 y.o. female)  Date: 12/12/2020  Primary Diagnosis: Seizure ([address] [R56.9]        Precautions: fall         ASSESSMENT  Pt received semi supine in bed, A&O to self and year (believes it is October and she is at a home) and agreeable for OT/PT RA. Pt has demonstrated progress towards goals since evaluation and now able to sit EOB with less A and attempt sit to stand transfer today. Pt currently presents with decreased balance, decreased activity tolerance, decreased safety awareness, generalized weakness, deficits in functional transfers/mobility (mod A rolling bed level, mod Ax2 sup->sit, min Ax2 sit->sup, max A scooting EOB level, max A progressing to SBA for seated EOB, max Ax2 sit<->stand and maintain stance 2/2 posterior lean). While seated EOB, pt bearing weight through marianne UE's to maintain balance and demonstrated posterio lateral lean to left at times however able to self correct with CGA and verbal cues. Pt c/o pain in LB with LE exercises at EOB and pt reported mild dizziness with sitting and standing activity. Other factors to consider for discharge: impaired mobility, level of support at home     Patient will benefit from skilled therapy intervention to address the above noted impairments.        PLAN :  Recommendations and Planned Interventions: bed mobility training, transfer training, gait training, therapeutic exercises, patient and family training/education, and therapeutic activities      Frequency/Duration: Patient will be followed by physical therapy:  5 times a week to address goals. Recommendation for discharge: (in order for the patient to meet his/her long term goals)  SNF    This discharge recommendation:  Has been made in collaboration with the attending provider and/or case management    Equipment recommendations for successful discharge (if) home: TBD         SUBJECTIVE:   Patient stated \"my back is hurting.     OBJECTIVE DATA SUMMARY:   HISTORY:    Past Medical History:   Diagnosis Date    Breast cancer ([facility]     Stroke (cerebrum) ([facility] 2019   History reviewed. No pertinent surgical history. Hospital course since last seen and reason for reevaluation: has been 7 days since last RA    Personal factors and/or comorbidities impacting plan of care: impaired mobility, level of family support    Home Situation  Home Environment: Private residence  Living Alone: No    EXAMINATION/PRESENTATION/DECISION MAKING:   Critical Behavior:  Neurologic State: Alert, Confused, Drowsy  Orientation Level: Disoriented to place, Disoriented to situation, Disoriented to time, Oriented to person(oriented to year not month)  Cognition: Impaired decision making, Other (comment)(Fluctuating)     Hearing:     Range Of Motion:  AROM: Generally decreased, functional           PROM: Generally decreased, functional           Strength:    Strength: Generally decreased, functional                       Functional Mobility:  Bed Mobility:  Rolling: Moderate assistance  Supine to Sit: Moderate assistance;Assist x2  Sit to Supine: Minimum assistance;Assist x2  Scooting: Maximum assistance  Transfers:  Sit to Stand: Maximum assistance;Assist x2  Stand to Sit: Maximum assistance;Assist x2    Pt with strong posterior lean, unable to straighten knees or tuck in buttocks to stand fully erect today. Balance:   Sitting: Impaired; With support  Sitting - Static: Fair (occasional)  Sitting - Dynamic: Fair (occasional)  Standing: Impaired;Pull to stand; With support  Standing - Static: Constant support;Poor  Ambulation/Gait Training:                                                            Therapeutic Exercises:   Seated marches, LAQ, ankle pumps x 10 each      Pain Ratin/10 in LB with exercise and sitting EOB    Activity Tolerance:   Fair and requires rest breaks  Please refer to the flowsheet for vital signs taken during this treatment. After treatment patient left in no apparent distress:   Supine in bed and Call bell within reach    COMMUNICATION/EDUCATION:   The patients plan of care was discussed with: Occupational therapist and Registered nurse. Patient/family agree to work toward stated goals and plan of care. PT/OT sessions occurred together for increased safety of pt and clinician.      Thank you for this referral.  Renard Speed   Time Calculation: 18 mins

## 2020-12-13 LAB
ANION GAP SERPL CALC-SCNC: 5 MMOL/L (ref 5–15)
BUN SERPL-MCNC: 21 MG/DL (ref 6–20)
BUN/CREAT SERPL: 21 (ref 12–20)
CA-I BLD-MCNC: 9.2 MG/DL (ref 8.5–10.1)
CHLORIDE SERPL-SCNC: 109 MMOL/L (ref 97–108)
CO2 SERPL-SCNC: 31 MMOL/L (ref 21–32)
CREAT SERPL-MCNC: 1 MG/DL (ref 0.55–1.02)
GLUCOSE BLD STRIP.AUTO-MCNC: 137 MG/DL (ref 65–100)
GLUCOSE BLD STRIP.AUTO-MCNC: 164 MG/DL (ref 65–100)
GLUCOSE BLD STRIP.AUTO-MCNC: 190 MG/DL (ref 65–100)
GLUCOSE SERPL-MCNC: 120 MG/DL (ref 65–100)
PERFORMED BY, TECHID: ABNORMAL
POTASSIUM SERPL-SCNC: 3.4 MMOL/L (ref 3.5–5.1)
SODIUM SERPL-SCNC: 145 MMOL/L (ref 136–145)

## 2020-12-13 PROCEDURE — 74011250637 HC RX REV CODE- 250/637: Performed by: HOSPITALIST

## 2020-12-13 PROCEDURE — 74011250637 HC RX REV CODE- 250/637: Performed by: STUDENT IN AN ORGANIZED HEALTH CARE EDUCATION/TRAINING PROGRAM

## 2020-12-13 PROCEDURE — 74011250637 HC RX REV CODE- 250/637: Performed by: INTERNAL MEDICINE

## 2020-12-13 PROCEDURE — 80048 BASIC METABOLIC PNL TOTAL CA: CPT

## 2020-12-13 PROCEDURE — 36415 COLL VENOUS BLD VENIPUNCTURE: CPT

## 2020-12-13 PROCEDURE — 65270000029 HC RM PRIVATE

## 2020-12-13 RX ORDER — LOSARTAN POTASSIUM 50 MG/1
50 TABLET ORAL DAILY
Status: DISCONTINUED | OUTPATIENT
Start: 2020-12-14 | End: 2020-12-13

## 2020-12-13 RX ORDER — POTASSIUM CHLORIDE 20 MEQ/1
20 TABLET, EXTENDED RELEASE ORAL
Status: COMPLETED | OUTPATIENT
Start: 2020-12-13 | End: 2020-12-13

## 2020-12-13 RX ORDER — LOSARTAN POTASSIUM 50 MG/1
100 TABLET ORAL DAILY
Status: DISCONTINUED | OUTPATIENT
Start: 2020-12-14 | End: 2020-12-15 | Stop reason: HOSPADM

## 2020-12-13 RX ADMIN — HYDRALAZINE HYDROCHLORIDE 50 MG: 50 TABLET, FILM COATED ORAL at 09:17

## 2020-12-13 RX ADMIN — MELOXICAM 7.5 MG: 7.5 TABLET ORAL at 09:18

## 2020-12-13 RX ADMIN — POTASSIUM CHLORIDE 20 MEQ: 1500 TABLET, EXTENDED RELEASE ORAL at 21:40

## 2020-12-13 RX ADMIN — TRAZODONE HYDROCHLORIDE 50 MG: 50 TABLET ORAL at 21:32

## 2020-12-13 RX ADMIN — LOSARTAN POTASSIUM 100 MG: 50 TABLET, FILM COATED ORAL at 09:17

## 2020-12-13 RX ADMIN — PANTOPRAZOLE SODIUM 40 MG: 20 TABLET, DELAYED RELEASE ORAL at 09:17

## 2020-12-13 RX ADMIN — HYDRALAZINE HYDROCHLORIDE 50 MG: 50 TABLET, FILM COATED ORAL at 16:58

## 2020-12-13 RX ADMIN — LEVETIRACETAM 500 MG: 500 TABLET ORAL at 09:17

## 2020-12-13 RX ADMIN — LEVETIRACETAM 500 MG: 500 TABLET ORAL at 21:31

## 2020-12-13 RX ADMIN — METOPROLOL TARTRATE 100 MG: 50 TABLET, FILM COATED ORAL at 09:18

## 2020-12-13 RX ADMIN — AMLODIPINE BESYLATE 10 MG: 5 TABLET ORAL at 09:16

## 2020-12-13 RX ADMIN — ATORVASTATIN CALCIUM 40 MG: 40 TABLET, FILM COATED ORAL at 09:17

## 2020-12-13 RX ADMIN — ASPIRIN 325 MG ORAL TABLET 325 MG: 325 PILL ORAL at 09:16

## 2020-12-13 NOTE — PROGRESS NOTES
Progress note    Subjective:   Chief Complaint : seizure-like activity                                Worsening left sided weakness prior to arrival.   Source of information : EMS, patients grand-daughter. History of present illness:     89F, h.o breast cancer and stroke (prior left sided wekaness) with seizure like activity and left sided weakness prior to arrival.   Being treated for seizures with possible pneumonia. MRI negative for acute stroke but PRES syndrome cannot be ruled out given hi blood pressure  Per grand-daughter, at baseline she is ambulatory with walker. Also has a left sided weakness from stroke in 2019    alert, pleasantly confused aao x 3. Acute events tolerating diet though decreased appetite  nad  Denies specific complaints. BP relatively stable        Past Medical History:   Diagnosis Date    Breast cancer ([facility]     Stroke (cerebrum) ([facility] [address] [facility] 2019     History reviewed. No pertinent surgical history. History reviewed. No pertinent family history. Social History     Tobacco Use    Smoking status: Not on file   Substance Use Topics    Alcohol use: Not on file       Prior to Admission medications    Not on File     No Known Allergies          Review of Systems:  Review of Systems   Constitutional: Negative for chills and fever. HENT: Negative. Eyes: Negative. Respiratory: Negative for cough. Cardiovascular: Negative. Gastrointestinal: Negative. Genitourinary: Negative. Musculoskeletal: Negative. Skin: Negative. Neurological: Positive for weakness. Endo/Heme/Allergies: Negative. Psychiatric/Behavioral: Negative. Vitals:     Visit Vitals  BP (!) 93/52   Pulse 68   Temp 98.8 °F (37.1 °C)   Resp 16   Ht 5' 7\" (1.702 m)   Wt 113.4 kg (250 lb)   SpO2 96%   BMI 39.16 kg/m²       Physical Exam:   Constitutional: elderly, nad, looks stated age. Speaking clearly and interactive, pleasant  Head: Normocephalic and atraumatic.   Mouth/Throat: Moist mucous membranes. Eyes: EOMI. No scleral icterus. Neck: Neck supple. Cardiovascular: regular rate and rhythm. Normal heart sounds. No murmur, rub, or gallop. Good pulses throughout. Pulmonary/Chest: No respiratory distress. Clear to auscultation bilaterally. No wheezes, rales, or rhonchi. Abdominal/GI: BS normal, Soft, non-tender, non-distended. No rebound or guarding. Musculoskeletal: No gross injuries or deformities. Neurological: Awake and oriented x 3, can't tell me why here, oriented still to place and time and year  Psych: Calm, not agitated   Skin: Skin is warm and dry. No rash noted. Data Review:   Recent Results (from the past 24 hour(s))   GLUCOSE, POC    Collection Time: 12/12/20  9:19 PM   Result Value Ref Range    Glucose (POC) 137 (H) 65 - 100 mg/dL    Performed by Fabby Wheeler    METABOLIC PANEL, BASIC    Collection Time: 12/13/20  5:40 AM   Result Value Ref Range    Sodium 145 136 - 145 mmol/L    Potassium 3.4 (L) 3.5 - 5.1 mmol/L    Chloride 109 (H) 97 - 108 mmol/L    CO2 31 21 - 32 mmol/L    Anion gap 5 5 - 15 mmol/L    Glucose 120 (H) 65 - 100 mg/dL    BUN 21 (H) 6 - 20 mg/dL    Creatinine 1.00 0.55 - 1.02 mg/dL    BUN/Creatinine ratio 21 (H) 12 - 20      GFR est AA >60 >60 ml/min/1.73m2    GFR est non-AA 52 (L) >60 ml/min/1.73m2    Calcium 9.2 8.5 - 10.1 mg/dL         MRI Brain 12/3:  IMPRESSION: Cerebral atrophy. Right posterior parietal encephalomalacia. Periventricular/subcortical white matter gliosis. Evidence for advanced nonacute end vessel occlusive change.     Diffusion-weighted images demonstrate no signal abnormality/diffusion  restriction to suggest the presence of acute vascular insult. Assessment and Plan :     --Seizures : keppra 500mg BID. Seizures precaution. No seizures reported here overnight. --No acute stroke cva per mri- result above reviewed.      --Encephalopathy vs dementia, seems inproved though aao x 3    --HTN urgency: trend improved, amlodipine 10 mg daily, metoprolol 100 mg bid, Hydralazine increased 50 mg tid, Losartan 100 mg daily. Prn labetolol. Remains adequate    --H/o CVA + left side weak, chronic and remains a bit weaker from baseline    --RENAN: s/t prerenal. Cr improved. --Hypokalemia: repleted, follow    --Hypernatremia na better    --Possible Pneumonia: azithromycin x 5 days. Afebrile. Resp status stable on 2 l/m. Covidd not detected. --Oral pharyngeal dysphagia: ST following, ground/ntl, free water protocol. Aspiration/gerd precautions, advance safely as tolerated, st following for further recs. --Covid Not Detected 12/4    PLAN:  Pending placement    DVT ppx: heparin SubQ, pending home meds.      Signed By: Rocco Puga MD     December 13, 2020

## 2020-12-13 NOTE — PROGRESS NOTES
Attempted to visit patient in [facility] oncology during rounds. Only the patient was present during the attempted visit. Patient seemed to be sleeping and did not respond to verbal greeting or knocking on door. I provided silent support and prayer. Chaplains will follow up if further referrals are requested. PUSHPA Abdullahi.Div.    can be reached by calling the  at [facility]  [phone]

## 2020-12-13 NOTE — ROUTINE PROCESS
Report given to ALYCIA Edwards RN oncoming nurse to include sbar, mar, kardex, and recent orders and changes.

## 2020-12-13 NOTE — ROUTINE PROCESS
Report given to ALYCIA Carreno RN oncoming nurse  To include sbar, mar, kardex, and recent orders and changes

## 2020-12-13 NOTE — ROUTINE PROCESS
Bedside and Verbal shift change report given to Chema Vela RN(oncoming nurse) by Alison Arboleda (offgoing nurse). Report included the following information SBAR, MAR and [employer].

## 2020-12-14 PROBLEM — Z86.73 HISTORY OF CVA (CEREBROVASCULAR ACCIDENT): Status: ACTIVE | Noted: 2020-12-14

## 2020-12-14 PROBLEM — R53.1 ASTHENIA: Status: ACTIVE | Noted: 2020-12-14

## 2020-12-14 PROBLEM — R13.12 OROPHARYNGEAL DYSPHAGIA: Status: ACTIVE | Noted: 2020-12-14

## 2020-12-14 PROBLEM — E87.6 HYPOKALEMIA: Status: RESOLVED | Noted: 2020-12-14 | Resolved: 2020-12-14

## 2020-12-14 PROBLEM — E87.0 HYPERNATREMIA: Status: ACTIVE | Noted: 2020-12-14

## 2020-12-14 PROBLEM — G81.94 LEFT HEMIPLEGIA (HCC): Status: ACTIVE | Noted: 2020-12-14

## 2020-12-14 PROBLEM — I16.0 HYPERTENSIVE URGENCY: Status: ACTIVE | Noted: 2020-12-14

## 2020-12-14 PROBLEM — E87.0 HYPERNATREMIA: Status: RESOLVED | Noted: 2020-12-14 | Resolved: 2020-12-14

## 2020-12-14 PROBLEM — N17.9 AKI (ACUTE KIDNEY INJURY) (HCC): Status: ACTIVE | Noted: 2020-12-14

## 2020-12-14 PROBLEM — J18.9 PNEUMONIA: Status: RESOLVED | Noted: 2020-12-14 | Resolved: 2020-12-14

## 2020-12-14 PROBLEM — E87.6 HYPOKALEMIA: Status: ACTIVE | Noted: 2020-12-14

## 2020-12-14 PROBLEM — J18.9 PNEUMONIA: Status: ACTIVE | Noted: 2020-12-14

## 2020-12-14 PROBLEM — Z20.822 LAB TEST NEGATIVE FOR COVID-19 VIRUS: Status: ACTIVE | Noted: 2020-12-14

## 2020-12-14 PROBLEM — N17.9 AKI (ACUTE KIDNEY INJURY) (HCC): Status: RESOLVED | Noted: 2020-12-14 | Resolved: 2020-12-14

## 2020-12-14 PROBLEM — G93.40 ENCEPHALOPATHY ACUTE: Status: RESOLVED | Noted: 2020-12-14 | Resolved: 2020-12-14

## 2020-12-14 PROCEDURE — 74011250637 HC RX REV CODE- 250/637: Performed by: INTERNAL MEDICINE

## 2020-12-14 PROCEDURE — 65270000029 HC RM PRIVATE

## 2020-12-14 PROCEDURE — 74011250637 HC RX REV CODE- 250/637: Performed by: STUDENT IN AN ORGANIZED HEALTH CARE EDUCATION/TRAINING PROGRAM

## 2020-12-14 PROCEDURE — 74011250637 HC RX REV CODE- 250/637: Performed by: HOSPITALIST

## 2020-12-14 PROCEDURE — 74011250637 HC RX REV CODE- 250/637: Performed by: FAMILY MEDICINE

## 2020-12-14 PROCEDURE — 97530 THERAPEUTIC ACTIVITIES: CPT

## 2020-12-14 RX ORDER — LEVETIRACETAM 500 MG/1
500 TABLET ORAL 2 TIMES DAILY
Qty: 60 TAB | Refills: 0 | Status: ON HOLD
Start: 2020-12-14 | End: 2021-02-23 | Stop reason: SDUPTHER

## 2020-12-14 RX ORDER — ATORVASTATIN CALCIUM 40 MG/1
40 TABLET, FILM COATED ORAL DAILY
Qty: 30 TAB | Refills: 0 | Status: SHIPPED
Start: 2020-12-15 | End: 2021-02-16

## 2020-12-14 RX ORDER — METOPROLOL TARTRATE 100 MG/1
100 TABLET ORAL 2 TIMES DAILY
Qty: 60 TAB | Refills: 0 | Status: SHIPPED
Start: 2020-12-14

## 2020-12-14 RX ORDER — HYDRALAZINE HYDROCHLORIDE 50 MG/1
50 TABLET, FILM COATED ORAL 3 TIMES DAILY
Qty: 90 TAB | Refills: 0 | Status: ON HOLD
Start: 2020-12-14 | End: 2021-02-23 | Stop reason: SDUPTHER

## 2020-12-14 RX ORDER — AMLODIPINE BESYLATE 10 MG/1
10 TABLET ORAL DAILY
Qty: 30 TAB | Refills: 0 | Status: SHIPPED
Start: 2020-12-15 | End: 2021-02-16

## 2020-12-14 RX ORDER — TRAZODONE HYDROCHLORIDE 50 MG/1
50 TABLET ORAL
Qty: 30 TAB | Refills: 0 | Status: ON HOLD
Start: 2020-12-14 | End: 2021-02-23

## 2020-12-14 RX ORDER — MECLIZINE HYDROCHLORIDE 25 MG/1
25 TABLET ORAL
Qty: 20 TAB | Refills: 0 | Status: SHIPPED
Start: 2020-12-14 | End: 2020-12-24

## 2020-12-14 RX ORDER — PANTOPRAZOLE SODIUM 40 MG/1
40 TABLET, DELAYED RELEASE ORAL
Qty: 30 TAB | Refills: 0 | Status: SHIPPED
Start: 2020-12-15 | End: 2021-02-23

## 2020-12-14 RX ORDER — ASPIRIN 325 MG
325 TABLET ORAL DAILY
Qty: 30 TAB | Refills: 0 | Status: SHIPPED
Start: 2020-12-15 | End: 2021-01-14

## 2020-12-14 RX ORDER — LOSARTAN POTASSIUM 100 MG/1
100 TABLET ORAL DAILY
Qty: 30 TAB | Refills: 0 | Status: ON HOLD
Start: 2020-12-15 | End: 2021-02-23

## 2020-12-14 RX ORDER — ACETAMINOPHEN 325 MG/1
650 TABLET ORAL
Qty: 30 TAB | Refills: 0 | Status: ON HOLD
Start: 2020-12-14 | End: 2021-02-23 | Stop reason: SDUPTHER

## 2020-12-14 RX ADMIN — TRAZODONE HYDROCHLORIDE 50 MG: 50 TABLET ORAL at 21:44

## 2020-12-14 RX ADMIN — ATORVASTATIN CALCIUM 40 MG: 40 TABLET, FILM COATED ORAL at 10:21

## 2020-12-14 RX ADMIN — METOPROLOL TARTRATE 100 MG: 50 TABLET, FILM COATED ORAL at 10:21

## 2020-12-14 RX ADMIN — MELOXICAM 7.5 MG: 7.5 TABLET ORAL at 10:21

## 2020-12-14 RX ADMIN — AMLODIPINE BESYLATE 10 MG: 5 TABLET ORAL at 10:21

## 2020-12-14 RX ADMIN — HYDRALAZINE HYDROCHLORIDE 50 MG: 50 TABLET, FILM COATED ORAL at 21:44

## 2020-12-14 RX ADMIN — HYDRALAZINE HYDROCHLORIDE 50 MG: 50 TABLET, FILM COATED ORAL at 10:21

## 2020-12-14 RX ADMIN — METOPROLOL TARTRATE 100 MG: 50 TABLET, FILM COATED ORAL at 21:44

## 2020-12-14 RX ADMIN — LEVETIRACETAM 500 MG: 500 TABLET ORAL at 10:21

## 2020-12-14 RX ADMIN — ACETAMINOPHEN 650 MG: 325 TABLET, FILM COATED ORAL at 10:24

## 2020-12-14 RX ADMIN — HYDRALAZINE HYDROCHLORIDE 50 MG: 50 TABLET, FILM COATED ORAL at 17:50

## 2020-12-14 RX ADMIN — LOSARTAN POTASSIUM 100 MG: 50 TABLET, FILM COATED ORAL at 10:22

## 2020-12-14 RX ADMIN — LEVETIRACETAM 500 MG: 500 TABLET ORAL at 21:44

## 2020-12-14 RX ADMIN — ASPIRIN 325 MG ORAL TABLET 325 MG: 325 PILL ORAL at 10:21

## 2020-12-14 RX ADMIN — ACETAMINOPHEN 650 MG: 325 TABLET, FILM COATED ORAL at 21:44

## 2020-12-14 NOTE — DISCHARGE SUMMARY
Physician Discharge Summary     Patient ID:    Milagros Cotton  [id number]  25 y.o.  9/3/1931    Admit date: 12/2/2020    Discharge date : 12/14/2020    Chronic Diagnoses:    Problem List as of 12/14/2020 Never Reviewed          Codes Class Noted - Resolved    Hypertensive urgency ICD-10-CM: I16.0  ICD-9-CM: 401.9  12/14/2020 - Present        History of CVA (cerebrovascular accident) ICD-10-CM: Z86.73  ICD-9-CM: V12.54  12/14/2020 - Present        Left hemiplegia ([facility] ICD-10-CM: G81.94  ICD-9-CM: 342.90  12/14/2020 - Present    Overview Signed 12/14/2020  5:24 PM by De Cano MD     From prior cva             Oropharyngeal dysphagia ICD-10-CM: R13.12  ICD-9-CM: 787.22  12/14/2020 - Present        Lab test negative for COVID-19 virus ICD-10-CM: Z03.818  ICD-9-CM: V71.83  12/14/2020 - Present        Asthenia ICD-10-CM: R53.1  ICD-9-CM: 780.79  12/14/2020 - Present        Seizure ([facility] ICD-10-CM: R56.9  ICD-9-CM: 780.39  12/2/2020 - Present        RESOLVED: Encephalopathy acute ICD-10-CM: G93.40  ICD-9-CM: 348.30  12/14/2020 - 12/14/2020        RESOLVED: Pneumonia ICD-10-CM: J18.9  ICD-9-CM: 486  12/14/2020 - 12/14/2020        RESOLVED: RENAN (acute kidney injury) ([facility] ICD-10-CM: N17.9  ICD-9-CM: 584.9  12/14/2020 - 12/14/2020        RESOLVED: Hypokalemia ICD-10-CM: E87.6  ICD-9-CM: 276.8  12/14/2020 - 12/14/2020        RESOLVED: Hypernatremia ICD-10-CM: E87.0  ICD-9-CM: 276.0  12/14/2020 - 12/14/2020          22    Final Diagnoses:   Seizure ([address] [R56.9]   Encephalopath, acute  Htpn Urgency  Hx cva, Chronic left hemiplegia    Reason for Hospitalization:  Encephalopathy, hypertensive urgency, seizures      Hospital Course:   89F, h.o breast cancer and stroke (prior left sided wekaness) with seizure like activity and left sided weakness prior to arrival.   She was loaded with keppra iv in ED. Concurrently hptn urgency.  Was code stroke in ED seen by teleneuro and subsequently Neuro:    Stroke Workup  CTH WO  NAICA  Remote but interval development of right parietooccipital infarct compared to [facility] from 12/2019  CTA Head/Neck  High-grade stenosis in the right M1  Moderate grade stenosis in the left M2  25 to 30% atherosclerotic disease in the right carotid bifurcation    MRI Brain WO  NAICA  Remote right posterior parietal infarct  Generalized cerebral atrophy  CMIC    Given patient's mentation is likely back to her baseline as she is fully alert and oriented without any focal deficits that are new on examination no further neurologic work-up will be necessary at the current time. Recommend asa 325, atorvastatin 40 mg daily, sbp gaol <160 mmhg    CXR:  Patchy groundglass opacities in the right upper lobe likely signifying pneumonia vs edema. Completed 5d abx. Clinically better, respiratory stable. Per grand-daughter, at baseline she is ambulatory with walker. Also has a left sided weakness from stroke in 2019. Delay 2/2 auth for snf. --Seizures : keppra 500mg BID. Seizures precaution. No seizures reported .     --No acute stroke cva per mri- result above reviewed.      --Encephalopathy vs dementia, seems inproved aao x 3, improved but feels not quite at baseline.     --HTN urgency: trend improved, amlodipine 10 mg daily, metoprolol 100 mg bid, Hydralazine increased 50 mg tid, Losartan 100 mg daily. Remains adequate     --H/o CVA + left side weak, chronic and remains a bit weaker from baseline     --RENAN: s/t prerenal. Cr stable.       --Hypokalemia: repleted, follow with repeat bmp 3-5 days     --Hypernatremia resolved, bmp 3-5 days.      --Possible Pneumonia: azithromycin x 5 days. Afebrile. Resp status stable weaned to room air. Covid not detected.      --Oral pharyngeal dysphagia: ST following, mechanical /ntl, free water protocol. Aspiration/gerd precautions, advance safely as tolerated, st following for further recs. Mbs12/10 Episode of penetration with thin consistency. ST to follow snf    --Asthenia snf pt/ot     --Covid Not Detected 12/4      Discharge Medications:   Current Discharge Medication List      START taking these medications    Details   acetaminophen (TYLENOL) 325 mg tablet Take 2 Tabs by mouth every four (4) hours as needed for Pain or Fever. Qty: 30 Tab, Refills: 0      amLODIPine (NORVASC) 10 mg tablet Take 1 Tab by mouth daily. Qty: 30 Tab, Refills: 0      aspirin (ASPIRIN) 325 mg tablet Take 1 Tab by mouth daily for 30 days. Qty: 30 Tab, Refills: 0      atorvastatin (LIPITOR) 40 mg tablet Take 1 Tab by mouth daily. Qty: 30 Tab, Refills: 0      hydrALAZINE (APRESOLINE) 50 mg tablet Take 1 Tab by mouth three (3) times daily. Qty: 90 Tab, Refills: 0      levETIRAcetam (KEPPRA) 500 mg tablet Take 1 Tab by mouth two (2) times a day. Qty: 60 Tab, Refills: 0      losartan (COZAAR) 100 mg tablet Take 1 Tab by mouth daily. Qty: 30 Tab, Refills: 0      meclizine (ANTIVERT) 25 mg tablet Take 1 Tab by mouth three (3) times daily as needed for Dizziness for up to 10 days. Qty: 20 Tab, Refills: 0      metoprolol tartrate (LOPRESSOR) 100 mg IR tablet Take 1 Tab by mouth two (2) times a day. Qty: 60 Tab, Refills: 0      pantoprazole (PROTONIX) 40 mg tablet Take 1 Tab by mouth Daily (before breakfast). Qty: 30 Tab, Refills: 0      traZODone (DESYREL) 50 mg tablet Take 1 Tab by mouth nightly. Qty: 30 Tab, Refills: 0               Follow up Care:    1. Randee Marinelli MD in 1-2 weeks. Please call to set up an appointment shortly after discharge. 2. Neurology Dr Marian Chavez 2 weeks    Diet: Cardiac, Ground/nectar w/ free water after meals following oral care  Only feed when alert, no straws, single sips and remain upright 1 hour after PO intake. Disposition:[place].    Patient will go to room 211A      Advanced Directive:   FULL x   DNR      Discharge Exam:  Patient Vitals for the past 12 hrs:   Temp Pulse Resp BP SpO2   12/14/20 1641 97.9 °F (36.6 °C) 71 18 (!) 152/68 98 %   12/14/20 0811 97.8 °F (36.6 °C) 88 18 (!) 165/65 96 %   Constitutional: elderly, nad, looks stated age. Speaking clearly and interactive, pleasant  Head: Normocephalic and atraumatic. Mouth/Throat: Moist mucous membranes. Eyes: EOMI. No scleral icterus. Neck: Neck supple. Cardiovascular: regular rate and rhythm. Normal heart sounds. No murmur, rub, or gallop. Good pulses throughout. Pulmonary/Chest: No respiratory distress. Clear to auscultation bilaterally.  No wheezes, rales, or rhonchi. Abdominal/GI: BS normal, Soft, non-tender, non-distended. No rebound or guarding. Musculoskeletal: No gross injuries or deformities. Neurological: Awake and oriented x 3, can't tell me why here, oriented still to place and time and year  Psych: Calm, not agitated   Skin: Skin is warm and dry. No rash noted. Significant Diagnostic Studies:   12/2/2020: BUN 27 mg/dL* (Ref range: 6 - 20 mg/dL); Calcium 9.0 mg/dL (Ref range: 8.5 - 10.1 mg/dL); CO2 26 mmol/L (Ref range: 21 - 32 mmol/L); Creatinine 1.10 mg/dL* (Ref range: 0.55 - 1.02 mg/dL); Glucose 169 mg/dL* (Ref range: 65 - 100 mg/dL); HCT 32.1 %* (Ref range: 35.0 - 47.0 %); HGB 9.6 g/dL* (Ref range: 11.5 - 16.0 g/dL); Potassium 3.4 mmol/L* (Ref range: 3.5 - 5.1 mmol/L); Sodium 144 mmol/L (Ref range: 136 - 145 mmol/L)  12/3/2020: BUN 20 mg/dL (Ref range: 6 - 20 mg/dL); Calcium 9.5 mg/dL (Ref range: 8.5 - 10.1 mg/dL); CO2 27 mmol/L (Ref range: 21 - 32 mmol/L); Creatinine 0.92 mg/dL (Ref range: 0.55 - 1.02 mg/dL); Glucose 113 mg/dL* (Ref range: 65 - 100 mg/dL); Potassium 3.3 mmol/L* (Ref range: 3.5 - 5.1 mmol/L); Sodium 143 mmol/L (Ref range: 136 - 145 mmol/L)  No results for input(s): WBC, HGB, HCT, PLT, HGBEXT, HCTEXT, PLTEXT in the last 72 hours.   Recent Labs     12/13/20  0540      K 3.4*   *   CO2 31   BUN 21*   CREA 1.00   *   CA 9.2     No results for input(s): ALT, AP, TBIL, TBILI, TP, ALB, GLOB, GGT, AML, LPSE in the last 72 hours.    No lab exists for component: SGOT, GPT, AMYP, HLPSE  No results for input(s): INR, PTP, APTT, INREXT in the last 72 hours. No results for input(s): FE, TIBC, PSAT, FERR in the last 72 hours. No results for input(s): PH, PCO2, PO2 in the last 72 hours. No results for input(s): CPK, CKMB in the last 72 hours.     No lab exists for component: TROPONINI  Lab Results   Component Value Date/Time    Glucose (POC) 164 (H) 12/13/2020 07:29 PM    Glucose (POC) 190 (H) 12/13/2020 04:57 PM    Glucose (POC) 137 (H) 12/12/2020 09:19 PM    Glucose (POC) 204 (H) 12/12/2020 11:54 AM    Glucose (POC) 138 (H) 12/12/2020 07:54 AM           Signed:  Wilmar Roberts MD  12/14/2020  4:53 PM    Time 45 min

## 2020-12-14 NOTE — PROGRESS NOTES
Comprehensive Nutrition Assessment    Type and Reason for Visit: Reassess(Goal)    Nutrition Recommendations/Plan:   Continue Ground/NTL diet     Continue Ensure Enlive (NTL) TID  Continue Ensure Pdg TID    Document all PO intakes in EMR    Obtain updated measured BW    Nutrition Assessment:  Seizure-like activity pta, ? stroke. Neuro consulted- MRI without acute findings. Noted possible PRES syndrome 2/2 persistently high BP. Limited changes to care plan at this time. Awaiting d/c to SNF pending placement. SLP following, advanced to current Full/NTL diet on 12/2- PO intakes documented as 25-35% on Full/NTL. Diet then advanced to Ground/NTL (12/10) per SLP recs. PO intakes documented as 0-10% (all meals 12/13), 15% (L 12/11). RN endorses PO intakes poor-fair, pt requires coaxing to consume PO. Consuming some supplementation. Do not rec NG placement TF at this time unless plans for PEG placement. Labs (12/13): , K 3.4. Meds: statin, keppra, metroprolol, PPI. Malnutrition Assessment:  Malnutrition Status:  Mild malnutrition    Context:  Acute illness     Findings of the 6 clinical characteristics of malnutrition:   Energy Intake:  7 - 50% or less of est energy requirements for 5 or more days  Weight Loss:  Unable to assess     Body Fat Loss:  No significant body fat loss,     Muscle Mass Loss:  No significant muscle mass loss,    Fluid Accumulation:  No significant fluid accumulation,        Estimated Daily Nutrient Needs:  Energy (kcal): 1850kcal (25kcal/kg); Weight Used for Energy Requirements: Adjusted  Protein (g): 74g (1g/kg); Weight Used for Protein Requirements: Adjusted  Fluid (ml/day): 1850mL; Method Used for Fluid Requirements: 1 ml/kcal      Nutrition Related Findings:  NFPE without acute findings. Last BM 12/11 per EMR- RN reports no BM today, unsure of over weekend. No edema. No documented hx dysphagia, n/v, or c/d in EMR.       Wounds:    None       Current Nutrition Therapies:  DIET NUTRITIONAL SUPPLEMENTS Breakfast, Lunch, Dinner; [employer] (NTL)  DIET NUTRITIONAL SUPPLEMENTS Breakfast, Lunch, Dinner; Ensure Pudding  DIET CARDIAC Mechanical Soft; 2 Nectar/2 Mildly Thick    Anthropometric Measures:  · Height:  5' 7\" (170.2 cm)  · Current Body Wt:  113.4 kg (250 lb)(est)    · Ideal Body Wt:  135 lbs:  185.2 %   · Adjusted Body Weight: (74.1kg)   · BMI Category:  Obese class 2 (BMI 35.0-39. 9)       Nutrition Diagnosis:   · Swallowing difficulty related to cognitive or neurological impairment as evidenced by (SLP rec'd dysphagia diet)      Nutrition Interventions:   Food and/or Nutrient Delivery: Continue current diet, Start oral nutrition supplement  Nutrition Education and Counseling: No recommendations at this time  Coordination of Nutrition Care: Continue to monitor while inpatient    Goals:  Meet >75% EENs via PO in 7 days (not met)  Wt loss 1kg +/- 0.5kg per week of measured wt (giuseppe)  Maintain skin integrity (not met)  Lytes wnl (not met)    Nutrition Monitoring and Evaluation:   Behavioral-Environmental Outcomes: None identified  Food/Nutrient Intake Outcomes: Supplement intake, Diet advancement/tolerance  Physical Signs/Symptoms Outcomes: Biochemical data, Weight, Skin    Discharge Planning:     Too soon to determine     Electronically signed by Beverly Tucker on 12/14/2020 at 4:13 PM    Contact:

## 2020-12-14 NOTE — PROGRESS NOTES
PHYSICAL THERAPY TREATMENT  Patient: Jen Grigsby (27 y.o. female)  Date: 12/14/2020  Diagnosis: Seizure ([facility] [address] [R56.9]   <principal problem not specified>       Precautions:    Chart, physical therapy assessment, plan of care and goals were reviewed. ASSESSMENT  Patient continues with skilled PT services and is progressing towards goals. Pt semi supine in bed upon  PT/OT arrival and agreeable to session. Performed sup>sit with SBA. Pt sat EOB ~20 minutes while performing LE therex, see details below. Performed self feed at EOB (see OT note for details. Pt able to performed STS with mod A x 2 and able to take side steps with RW and min A x 2. Pt reported feeling dizzy while sitting EOB, BP taken at 162/67. Pt returned to semi supine position post session with all needs met and bed alarm on. Current Level of Function Impacting Discharge (mobility/balance): level of assistance needed for OOB mobility    Other factors to consider for discharge: decreased activity tolerance. PLAN :  Patient continues to benefit from skilled intervention to address the above impairments. Continue treatment per established plan of care. to address goals.     Recommendation for discharge: (in order for the patient to meet his/her long term goals)  Therapy up to 5 days/week in SNF setting    This discharge recommendation:  Has been made in collaboration with the attending provider and/or case management    IF patient discharges home will need the following DME: to be determined (TBD)       SUBJECTIVE:   Patient stated I feel dizzy\"    OBJECTIVE DATA SUMMARY:   Critical Behavior:  Neurologic State: Alert, Confused  Orientation Level: Disoriented to situation, Disoriented to time, Oriented to person, Oriented to place  Cognition: Decreased attention/concentration, Impaired decision making     Functional Mobility Training:  Bed Mobility:  Supine to Sit: Stand-by assistance  Sit to Supine: Stand-by assistance  Scooting: Stand-by assistance    Transfers:  Sit to Stand: Moderate assistance;Assist x2  Stand to Sit: Moderate assistance;Assist x2    Balance:  Sitting: Intact  Standing: Impaired  Standing - Static: Constant support  Standing - Dynamic : Constant support     Ambulation/Gait Training:  Distance (ft): 3 Feet (ft)  Assistive Device: Gait belt;Walker, rolling  Ambulation - Level of Assistance: Minimal assistance;Assist x2  Step Length: Left shortened;Right shortened         Therapeutic Exercises:   1 x 20 LAQ  1 x 20 AP  1 x 20 Marches     Pain Rating:  Reports no pain    Activity Tolerance:   Fair and requires rest breaks  Please refer to the flowsheet for vital signs taken during this treatment. After treatment patient left in no apparent distress:   Supine in bed, Call bell within reach, Bed / chair alarm activated, and Side rails x 3    COMMUNICATION/COLLABORATION:   The patients plan of care was discussed with: Occupational therapy assistant. OT/PT sessions occurred together for increased patient and clinician safety     Problem: Mobility Impaired (Adult and Pediatric)  Goal: *Acute Goals and Plan of Care (Insert Text)  Description: Pt will be I with LE HEP in 7 days. Pt will perform bed mobility with min A x1 in 7 days. Pt will perform transfers with min A x1 in 7 days. Pt will amb 10-25 feet with LRAD safely with mod A x1 in 7 days.      Outcome: Progressing Towards Goal       [place] Batters, PTA   Time Calculation: 35 mins

## 2020-12-14 NOTE — PROGRESS NOTES
Problem: Self Care Deficits Care Plan (Adult)  Goal: *Acute Goals and Plan of Care (Insert Text)  Description: Pt will be SBA sup <> sit in prep for EOB ADLs  Pt will be SBA grooming sitting EOB  Pt will be min A LE dressing sitting EOB/long sit  Pt will be min A sit <>  prep for toileting LRAD  Pt will be min A toileting/toilet transfer/cloth mgmt LRAD  Pt will be SPV following UE HEP in prep for self care tasks      Outcome: Progressing Towards Goal   OCCUPATIONAL THERAPY TREATMENT  Patient: Markos Hawk (38 y.o. female)  Date: 12/14/2020  Diagnosis: Seizure ([facility] [address] [R56.9]   <principal problem not specified>       Precautions:    Chart, occupational therapy assessment, plan of care, and goals were reviewed. ASSESSMENT  Patient continues with skilled OT services and is progressing towards goals. Pt. Received semi-supine in bed. Pt performed bed mobility with SBA. Pt performed static and dynamic sitting balance seated at EOB while performing self-feeding with supervision. Pt required assistance with container management. Pt able to performed sit-> stand with mod A x 2 and able to take side steps with RW and min A x 2. Pt required verbal and tactile cues for walker initiation. Pt reported feeling dizzy while sitting EOB, BP taken at 162/67. Pt returned to semi supine position post session with all needs met and bed alarm on. PLAN :  Patient continues to benefit from skilled intervention to address the above impairments. Continue treatment per established plan of care. to address goals.     Recommendation for discharge: (in order for the patient to meet his/her long term goals)  Therapy up to 5 days/week in SNF setting    This discharge recommendation:  Has been made in collaboration with the attending provider and/or case management    IF patient discharges home will need the following DME: to bed deteremined       SUBJECTIVE:   Patient stated i'm feeling really dizzy right now.    OBJECTIVE DATA SUMMARY:   Cognitive/Behavioral Status:  Neurologic State: Alert;Confused     Cognition: Decreased attention/concentration; Impaired decision making    Functional Mobility and Transfers for ADLs:  Bed Mobility:  Supine to Sit: Stand-by assistance  Sit to Supine: Stand-by assistance  Scooting: Stand-by assistance    Transfers:  Sit to Stand: Moderate assistance;Assist x2    Balance:  Sitting: Intact  Sitting - Static: Good (unsupported)  Sitting - Dynamic: Fair (occasional)  Standing: Impaired  Standing - Static: Constant support  Standing - Dynamic : Constant support    ADL Intervention:  Feeding  Feeding Assistance: Stand-by assistance  Container Management: Minimum assistance  Food to Mouth: Supervision  Drink to Mouth: Supervision    Upper Body Bathing  Bathing Assistance: Minimum assistance  Position Performed: Seated edge of bed    Lower Body Bathing  Perineal  : Moderate assistance  Position Performed: Supine    Pain:  0/10 pain reported    Activity Tolerance:   Fair- increased dizziness  Please refer to the flowsheet for vital signs taken during this treatment. After treatment patient left in no apparent distress:   Supine in bed, Call bell within reach, and Bed / chair alarm activated    COMMUNICATION/COLLABORATION:   The patients plan of care was discussed with: Physical therapy assistant and Registered nurse.      Tia Pepper  Time Calculation: 35 mins

## 2020-12-14 NOTE — ROUTINE PROCESS
Bedside and Verbal shift change report given to Mallika Carrillo (oncoming nurse) by Nancy Dodd (offgoing nurse). Report included the following information SBAR, Kardex, MAR and Accordion.

## 2020-12-14 NOTE — PROGRESS NOTES
[facility] has accepted patient. We received authorization for her to go to [facility]. Patient will go to room 211A  Call report to [phone]    CM has reached out to Dr. Jolan Riedel and he is aware of the authorization. Will continue to follow for discharge needs.

## 2020-12-15 VITALS
SYSTOLIC BLOOD PRESSURE: 152 MMHG | HEIGHT: 67 IN | WEIGHT: 250 LBS | DIASTOLIC BLOOD PRESSURE: 68 MMHG | BODY MASS INDEX: 39.24 KG/M2 | RESPIRATION RATE: 20 BRPM | TEMPERATURE: 97.6 F | HEART RATE: 74 BPM | OXYGEN SATURATION: 96 %

## 2020-12-15 PROCEDURE — 74011250637 HC RX REV CODE- 250/637: Performed by: INTERNAL MEDICINE

## 2020-12-15 PROCEDURE — 74011250637 HC RX REV CODE- 250/637: Performed by: STUDENT IN AN ORGANIZED HEALTH CARE EDUCATION/TRAINING PROGRAM

## 2020-12-15 PROCEDURE — 74011250637 HC RX REV CODE- 250/637: Performed by: HOSPITALIST

## 2020-12-15 RX ADMIN — LOSARTAN POTASSIUM 100 MG: 50 TABLET, FILM COATED ORAL at 09:26

## 2020-12-15 RX ADMIN — PANTOPRAZOLE SODIUM 40 MG: 20 TABLET, DELAYED RELEASE ORAL at 09:35

## 2020-12-15 RX ADMIN — ASPIRIN 325 MG ORAL TABLET 325 MG: 325 PILL ORAL at 09:26

## 2020-12-15 RX ADMIN — MELOXICAM 7.5 MG: 7.5 TABLET ORAL at 09:26

## 2020-12-15 RX ADMIN — ATORVASTATIN CALCIUM 40 MG: 40 TABLET, FILM COATED ORAL at 09:26

## 2020-12-15 RX ADMIN — HYDRALAZINE HYDROCHLORIDE 50 MG: 50 TABLET, FILM COATED ORAL at 09:26

## 2020-12-15 RX ADMIN — AMLODIPINE BESYLATE 10 MG: 5 TABLET ORAL at 09:26

## 2020-12-15 RX ADMIN — METOPROLOL TARTRATE 100 MG: 50 TABLET, FILM COATED ORAL at 09:26

## 2020-12-15 RX ADMIN — LEVETIRACETAM 500 MG: 500 TABLET ORAL at 09:26

## 2020-12-15 NOTE — PROGRESS NOTES
Discharge plan of care/case management plan validated with provider discharge order. Report called to Rachele at [facility]. Patient to transport via medical transport with [facility].

## 2020-12-15 NOTE — ROUTINE PROCESS
Bedside and Verbal shift change report given to Danial Woodward  (oncoming nurse) by Josselyn Dick RN(offgoing nurse). Report included the following information SBAR, Kardex, MAR and Accordion.
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
## 2021-01-29 ENCOUNTER — APPOINTMENT (OUTPATIENT)
Dept: GENERAL RADIOLOGY | Age: 86
End: 2021-01-29
Attending: STUDENT IN AN ORGANIZED HEALTH CARE EDUCATION/TRAINING PROGRAM
Payer: MEDICARE

## 2021-01-29 ENCOUNTER — HOSPITAL ENCOUNTER (EMERGENCY)
Age: 86
Discharge: HOME | End: 2021-01-30
Attending: EMERGENCY MEDICINE
Payer: MEDICARE

## 2021-01-29 DIAGNOSIS — J90 CHRONIC PLEURAL EFFUSION: Primary | ICD-10-CM

## 2021-01-29 DIAGNOSIS — J18.9 PNEUMONIA DUE TO INFECTIOUS ORGANISM, UNSPECIFIED LATERALITY, UNSPECIFIED PART OF LUNG: ICD-10-CM

## 2021-01-29 LAB
ALBUMIN SERPL-MCNC: 2.8 G/DL (ref 3.5–5)
ALBUMIN/GLOB SERPL: 0.5 {RATIO} (ref 1.1–2.2)
ALP SERPL-CCNC: 102 U/L (ref 45–117)
ALT SERPL-CCNC: 11 U/L (ref 12–78)
ANION GAP SERPL CALC-SCNC: 9 MMOL/L (ref 5–15)
APPEARANCE UR: CLEAR
AST SERPL W P-5'-P-CCNC: 12 U/L (ref 15–37)
BACTERIA URNS QL MICRO: NEGATIVE /HPF
BASOPHILS # BLD: 0 K/UL (ref 0–0.1)
BASOPHILS NFR BLD: 0 % (ref 0–1)
BILIRUB SERPL-MCNC: 0.4 MG/DL (ref 0.2–1)
BILIRUB UR QL: NEGATIVE
BNP SERPL-MCNC: 833 PG/ML
BUN SERPL-MCNC: 18 MG/DL (ref 6–20)
BUN/CREAT SERPL: 19 (ref 12–20)
CA-I BLD-MCNC: 9.4 MG/DL (ref 8.5–10.1)
CHLORIDE SERPL-SCNC: 110 MMOL/L (ref 97–108)
CO2 SERPL-SCNC: 26 MMOL/L (ref 21–32)
COLOR UR: ABNORMAL
CREAT SERPL-MCNC: 0.94 MG/DL (ref 0.55–1.02)
DIFFERENTIAL METHOD BLD: ABNORMAL
EOSINOPHIL # BLD: 0.1 K/UL (ref 0–0.4)
EOSINOPHIL NFR BLD: 2 % (ref 0–7)
ERYTHROCYTE [DISTWIDTH] IN BLOOD BY AUTOMATED COUNT: 16.5 % (ref 11.5–14.5)
GLOBULIN SER CALC-MCNC: 5.1 G/DL (ref 2–4)
GLUCOSE SERPL-MCNC: 147 MG/DL (ref 65–100)
GLUCOSE UR STRIP.AUTO-MCNC: 150 MG/DL
HCT VFR BLD AUTO: 30.4 % (ref 35–47)
HGB BLD-MCNC: 9.2 G/DL (ref 11.5–16)
HGB UR QL STRIP: NEGATIVE
IMM GRANULOCYTES # BLD AUTO: 0 K/UL (ref 0–0.04)
IMM GRANULOCYTES NFR BLD AUTO: 0 % (ref 0–0.5)
KETONES UR QL STRIP.AUTO: NEGATIVE MG/DL
LEUKOCYTE ESTERASE UR QL STRIP.AUTO: NEGATIVE
LYMPHOCYTES # BLD: 1.5 K/UL (ref 0.8–3.5)
LYMPHOCYTES NFR BLD: 23 % (ref 12–49)
MCH RBC QN AUTO: 23.9 PG (ref 26–34)
MCHC RBC AUTO-ENTMCNC: 30.3 G/DL (ref 30–36.5)
MCV RBC AUTO: 79 FL (ref 80–99)
MONOCYTES # BLD: 0.8 K/UL (ref 0–1)
MONOCYTES NFR BLD: 12 % (ref 5–13)
NEUTS SEG # BLD: 4.1 K/UL (ref 1.8–8)
NEUTS SEG NFR BLD: 63 % (ref 32–75)
NITRITE UR QL STRIP.AUTO: NEGATIVE
PH UR STRIP: 5 [PH] (ref 5–8)
PLATELET # BLD AUTO: 305 K/UL (ref 150–400)
PMV BLD AUTO: 10.9 FL (ref 8.9–12.9)
POTASSIUM SERPL-SCNC: 3.4 MMOL/L (ref 3.5–5.1)
PROT SERPL-MCNC: 7.9 G/DL (ref 6.4–8.2)
PROT UR STRIP-MCNC: 30 MG/DL
RBC # BLD AUTO: 3.85 M/UL (ref 3.8–5.2)
RBC #/AREA URNS HPF: ABNORMAL /HPF (ref 0–5)
SODIUM SERPL-SCNC: 145 MMOL/L (ref 136–145)
SP GR UR REFRACTOMETRY: 1.02 (ref 1–1.03)
TROPONIN I SERPL-MCNC: <0.05 NG/ML
UROBILINOGEN UR QL STRIP.AUTO: 2 EU/DL (ref 0.1–1)
WBC # BLD AUTO: 6.5 K/UL (ref 3.6–11)
WBC URNS QL MICRO: ABNORMAL /HPF (ref 0–4)

## 2021-01-29 PROCEDURE — 84484 ASSAY OF TROPONIN QUANT: CPT

## 2021-01-29 PROCEDURE — 80053 COMPREHEN METABOLIC PANEL: CPT

## 2021-01-29 PROCEDURE — 85025 COMPLETE CBC W/AUTO DIFF WBC: CPT

## 2021-01-29 PROCEDURE — 93005 ELECTROCARDIOGRAM TRACING: CPT

## 2021-01-29 PROCEDURE — U0003 INFECTIOUS AGENT DETECTION BY NUCLEIC ACID (DNA OR RNA); SEVERE ACUTE RESPIRATORY SYNDROME CORONAVIRUS 2 (SARS-COV-2) (CORONAVIRUS DISEASE [COVID-19]), AMPLIFIED PROBE TECHNIQUE, MAKING USE OF HIGH THROUGHPUT TECHNOLOGIES AS DESCRIBED BY CMS-2020-01-R: HCPCS

## 2021-01-29 PROCEDURE — 71045 X-RAY EXAM CHEST 1 VIEW: CPT

## 2021-01-29 PROCEDURE — 81001 URINALYSIS AUTO W/SCOPE: CPT

## 2021-01-29 PROCEDURE — 83880 ASSAY OF NATRIURETIC PEPTIDE: CPT

## 2021-01-29 PROCEDURE — 74011250637 HC RX REV CODE- 250/637: Performed by: EMERGENCY MEDICINE

## 2021-01-29 PROCEDURE — 99285 EMERGENCY DEPT VISIT HI MDM: CPT

## 2021-01-29 PROCEDURE — 36415 COLL VENOUS BLD VENIPUNCTURE: CPT

## 2021-01-29 RX ORDER — LEVOFLOXACIN 500 MG/1
500 TABLET, FILM COATED ORAL ONCE
Status: COMPLETED | OUTPATIENT
Start: 2021-01-29 | End: 2021-01-29

## 2021-01-29 RX ORDER — LEVOFLOXACIN 500 MG/1
500 TABLET, FILM COATED ORAL DAILY
Qty: 7 TAB | Refills: 0 | Status: SHIPPED | OUTPATIENT
Start: 2021-01-29 | End: 2021-02-05

## 2021-01-29 RX ADMIN — LEVOFLOXACIN 500 MG: 500 TABLET, FILM COATED ORAL at 23:27

## 2021-01-30 VITALS
TEMPERATURE: 98.9 F | SYSTOLIC BLOOD PRESSURE: 176 MMHG | RESPIRATION RATE: 16 BRPM | HEIGHT: 65 IN | WEIGHT: 200 LBS | BODY MASS INDEX: 33.32 KG/M2 | HEART RATE: 82 BPM | OXYGEN SATURATION: 99 % | DIASTOLIC BLOOD PRESSURE: 88 MMHG

## 2021-01-30 LAB — SARS-COV-2, COV2: NORMAL

## 2021-01-30 RX ORDER — LEVOFLOXACIN 500 MG/1
500 TABLET, FILM COATED ORAL DAILY
Qty: 7 TAB | Refills: 0 | Status: SHIPPED | OUTPATIENT
Start: 2021-01-30 | End: 2021-02-06

## 2021-01-30 NOTE — ED TRIAGE NOTES
Pt was sitting down and hard sharp breast pain that went away then had sob and called ems because she did not want to be left aloned because care giver was leaving.

## 2021-01-30 NOTE — ED PROVIDER NOTES
EMERGENCY DEPARTMENT HISTORY AND PHYSICAL EXAM      Date: 1/29/2021  Patient Name: Wade Signs      History of Presenting Illness     Chief Complaint   Patient presents with    Chest Pain       History Provided By: Patient    HPI: Wade Craig, 80 y.o. female with a past medical history significant for breast cancer, CVA, hypertension, CAD, seizure presents to the ED with cc of acute onset sharp pain underneath her right breast associated with episode of shortness of breath that has since resolved. She has noted a dry cough for the past 1 to 2 days. No fevers no chills no body aches. No abdominal pain nausea or vomiting. Chest pains are intermittent lasting several seconds at the time up to a minute. She started to feel better however she was concerned about being left home alone without having her symptoms evaluated. There are no other complaints, changes, or physical findings at this time. PCP: Prabhjot David MD    Current Outpatient Medications   Medication Sig Dispense Refill    levoFLOXacin (LEVAQUIN) 500 mg tablet Take 1 Tab by mouth daily for 7 days. 7 Tab 0    levoFLOXacin (Levaquin) 500 mg tablet Take 1 Tab by mouth daily for 7 days. 7 Tab 0    acetaminophen (TYLENOL) 325 mg tablet Take 2 Tabs by mouth every four (4) hours as needed for Pain or Fever. 30 Tab 0    amLODIPine (NORVASC) 10 mg tablet Take 1 Tab by mouth daily. 30 Tab 0    atorvastatin (LIPITOR) 40 mg tablet Take 1 Tab by mouth daily. 30 Tab 0    hydrALAZINE (APRESOLINE) 50 mg tablet Take 1 Tab by mouth three (3) times daily. 90 Tab 0    levETIRAcetam (KEPPRA) 500 mg tablet Take 1 Tab by mouth two (2) times a day. 60 Tab 0    losartan (COZAAR) 100 mg tablet Take 1 Tab by mouth daily. 30 Tab 0    metoprolol tartrate (LOPRESSOR) 100 mg IR tablet Take 1 Tab by mouth two (2) times a day. 60 Tab 0    pantoprazole (PROTONIX) 40 mg tablet Take 1 Tab by mouth Daily (before breakfast).  30 Tab 0    traZODone (DESYREL) 50 mg tablet Take 1 Tab by mouth nightly. 30 Tab 0       Past History     Past Medical History:  Past Medical History:   Diagnosis Date    Breast cancer ([facility] [address]     Hypertension     [facility]     [facility]     [facility]cerebrum[facility]       Past Surgical History:  No past surgical history on file. Family History:  No family history on file. Social History:  Social History     Tobacco Use    Smoking status: Never Smoker    Smokeless tobacco: Never Used   Substance Use Topics    Alcohol use: Never     Frequency: Never    Drug use: Never       Allergies:  No Known Allergies      Review of Systems     Review of Systems   Constitutional: Negative for appetite change, fatigue and fever. HENT: Negative for congestion, ear pain, sinus pressure, sinus pain and sore throat. Eyes: Negative for redness and visual disturbance. Respiratory: Positive for chest tightness and shortness of breath. Negative for cough. Cardiovascular: Positive for chest pain. Negative for palpitations and leg swelling. Gastrointestinal: Negative for abdominal pain, diarrhea, nausea and vomiting. Genitourinary: Negative for dysuria and flank pain. Musculoskeletal: Negative for arthralgias, back pain, gait problem and myalgias. Skin: Negative for rash. Neurological: Negative for dizziness, speech difficulty, light-headedness, numbness and headaches. Psychiatric/Behavioral: Negative for suicidal ideas. The patient is not nervous/anxious. Physical Exam     Physical Exam  Vitals signs and nursing note reviewed. Constitutional:       General: She is not in acute distress. Appearance: Normal appearance. She is not ill-appearing. Comments: Anxious otherwise in no distress   HENT:      Head: Normocephalic and atraumatic.       Right Ear: External ear normal.      Left Ear: External ear normal.      Nose: Nose normal.      Mouth/Throat:      Mouth: Mucous membranes are moist.      Pharynx: Oropharynx is clear. Eyes:      Conjunctiva/sclera: Conjunctivae normal.      Pupils: Pupils are equal, round, and reactive to light. Neck:      Musculoskeletal: Normal range of motion and neck supple. Cardiovascular:      Rate and Rhythm: Normal rate and regular rhythm. Pulses: Normal pulses. Heart sounds: Normal heart sounds. Pulmonary:      Effort: Pulmonary effort is normal. No tachypnea, accessory muscle usage or respiratory distress. Breath sounds: Normal breath sounds. No stridor. Abdominal:      Palpations: Abdomen is soft. Musculoskeletal: Normal range of motion. Skin:     General: Skin is warm and dry. Capillary Refill: Capillary refill takes less than 2 seconds. Neurological:      General: No focal deficit present. Mental Status: She is alert and oriented to person, place, and time. Mental status is at baseline. Psychiatric:         Mood and Affect: Mood normal.         Thought Content: Thought content normal.         Judgment: Judgment normal.         Lab and Diagnostic Study Results     Labs -     No results found for this or any previous visit (from the past 12 hour(s)). Radiologic Studies -   [unfilled]  CT Results  (Last 48 hours)    None        CXR Results  (Last 48 hours)    None          Medical Decision Making and ED Course   - I am the first and primary provider for this patient AND AM THE PRIMARY PROVIDER OF RECORD. - I reviewed the vital signs, available nursing notes, past medical history, past surgical history, family history and social history. - Initial assessment performed. The patients presenting problems have been discussed, and the staff are in agreement with the care plan formulated and outlined with them. I have encouraged them to ask questions as they arise throughout their visit. Vital Signs-Reviewed the patient's vital signs. No data found. ED Course:   Patient feeling improved labs unremarkable.  No recurrence of pain and sob has resolved. Discussed results of testing. Will dc w OP follouwp and return precautions discussed.     Provider Notes (Medical Decision Making):   89-year-old female presenting with an episode of sharp pain underneath her left breast that has resolved.  Also some shortness of breath that is resolved.  She has a history of stroke, cancer as well as coronary artery disease although her symptoms tonight seem more GI related.  Her imaging does show a chronic pleural effusion with a questionable new infiltrate on that right side.  After discussion with patient we will cover with antibiotics.  Follow-up with her primary care doctor.  MDM             Disposition     Disposition:      Discharged        DISCHARGE PLAN:  1.   Current Discharge Medication List      CONTINUE these medications which have NOT CHANGED    Details   acetaminophen (TYLENOL) 325 mg tablet Take 2 Tabs by mouth every four (4) hours as needed for Pain or Fever.  Qty: 30 Tab, Refills: 0      amLODIPine (NORVASC) 10 mg tablet Take 1 Tab by mouth daily.  Qty: 30 Tab, Refills: 0      atorvastatin (LIPITOR) 40 mg tablet Take 1 Tab by mouth daily.  Qty: 30 Tab, Refills: 0      hydrALAZINE (APRESOLINE) 50 mg tablet Take 1 Tab by mouth three (3) times daily.  Qty: 90 Tab, Refills: 0      levETIRAcetam (KEPPRA) 500 mg tablet Take 1 Tab by mouth two (2) times a day.  Qty: 60 Tab, Refills: 0      losartan (COZAAR) 100 mg tablet Take 1 Tab by mouth daily.  Qty: 30 Tab, Refills: 0      metoprolol tartrate (LOPRESSOR) 100 mg IR tablet Take 1 Tab by mouth two (2) times a day.  Qty: 60 Tab, Refills: 0      pantoprazole (PROTONIX) 40 mg tablet Take 1 Tab by mouth Daily (before breakfast).  Qty: 30 Tab, Refills: 0      traZODone (DESYREL) 50 mg tablet Take 1 Tab by mouth nightly.  Qty: 30 Tab, Refills: 0           2.   Follow-up Information     Follow up With Specialties Details Why Contact Info    Jaziel Ramos MD Internal Medicine Schedule an appointment as  soon as possible for a visit in 3 days For followup and recheck of todays symptoms [address] 1998 [address] [facility]      Mark Hassan DO Pulmonary Critical Care  For followup and recheck of todays symptoms [address] [facility] [address]  [phone]      [facility] EMERGENCY DEPT Emergency Medicine Go to  As needed, or for any concerns or deteriorations. , if symptoms persist or worsen. [address] [place] [address]  [phone]        3. Return to ED if worse   4. Discharge Medication List as of 1/29/2021 11:52 PM      START taking these medications    Details   levoFLOXacin (Levaquin) 500 mg tablet Take 1 Tab by mouth daily for 7 days. , Normal, Disp-7 Tab, R-0         CONTINUE these medications which have NOT CHANGED    Details   acetaminophen (TYLENOL) 325 mg tablet Take 2 Tabs by mouth every four (4) hours as needed for Pain or Fever., No Print, Disp-30 Tab,R-0      amLODIPine (NORVASC) 10 mg tablet Take 1 Tab by mouth daily. , No Print, Disp-30 Tab,R-0      atorvastatin (LIPITOR) 40 mg tablet Take 1 Tab by mouth daily. , No Print, Disp-30 Tab,R-0      hydrALAZINE (APRESOLINE) 50 mg tablet Take 1 Tab by mouth three (3) times daily. , No Print, Disp-90 Tab,R-0      levETIRAcetam (KEPPRA) 500 mg tablet Take 1 Tab by mouth two (2) times a day., No Print, Disp-60 Tab,R-0      losartan (COZAAR) 100 mg tablet Take 1 Tab by mouth daily. , No Print, Disp-30 Tab,R-0      metoprolol tartrate (LOPRESSOR) 100 mg IR tablet Take 1 Tab by mouth two (2) times a day., No Print, Disp-60 Tab,R-0      pantoprazole (PROTONIX) 40 mg tablet Take 1 Tab by mouth Daily (before breakfast). , No Print, Disp-30 Tab,R-0      traZODone (DESYREL) 50 mg tablet Take 1 Tab by mouth nightly., No Print, Disp-30 Tab,R-0             Diagnosis     Clinical Impression:   1. Chronic pleural effusion    2.  Pneumonia due to infectious organism, unspecified laterality, unspecified part of lung Attestations:    Mandie Jo MD    Please note that this dictation was completed with [employer], the computer voice recognition software. Quite often unanticipated grammatical, syntax, homophones, and other interpretive errors are inadvertently transcribed by the computer software. Please disregard these errors. Please excuse any errors that have escaped final proofreading. Thank you.

## 2021-01-31 LAB — SARS-COV-2, COV2NT: NOT DETECTED

## 2021-02-01 LAB
ATRIAL RATE: 71 BPM
CALCULATED P AXIS, ECG09: 76 DEGREES
CALCULATED R AXIS, ECG10: -4 DEGREES
CALCULATED T AXIS, ECG11: 2 DEGREES
DIAGNOSIS, 93000: NORMAL
P-R INTERVAL, ECG05: 156 MS
Q-T INTERVAL, ECG07: 390 MS
QRS DURATION, ECG06: 78 MS
QTC CALCULATION (BEZET), ECG08: 423 MS
VENTRICULAR RATE, ECG03: 71 BPM

## 2021-02-16 ENCOUNTER — HOSPITAL ENCOUNTER (INPATIENT)
Age: 86
LOS: 7 days | Discharge: HOME | DRG: 423 | End: 2021-02-23
Attending: EMERGENCY MEDICINE | Admitting: FAMILY MEDICINE
Payer: MEDICARE

## 2021-02-16 ENCOUNTER — APPOINTMENT (OUTPATIENT)
Dept: CT IMAGING | Age: 86
DRG: 423 | End: 2021-02-16
Attending: EMERGENCY MEDICINE
Payer: MEDICARE

## 2021-02-16 DIAGNOSIS — E80.6 HYPERBILIRUBINEMIA: ICD-10-CM

## 2021-02-16 DIAGNOSIS — G89.3 CANCER RELATED PAIN: ICD-10-CM

## 2021-02-16 DIAGNOSIS — C79.9 METASTATIC MALIGNANT NEOPLASM, UNSPECIFIED SITE (HCC): ICD-10-CM

## 2021-02-16 DIAGNOSIS — R17 JAUNDICE: ICD-10-CM

## 2021-02-16 DIAGNOSIS — K86.89 PANCREATIC MASS: Primary | ICD-10-CM

## 2021-02-16 DIAGNOSIS — R74.01 TRANSAMINITIS: ICD-10-CM

## 2021-02-16 DIAGNOSIS — E88.09 HYPOALBUMINEMIA: ICD-10-CM

## 2021-02-16 DIAGNOSIS — K59.03 DRUG INDUCED CONSTIPATION: ICD-10-CM

## 2021-02-16 LAB
ALBUMIN SERPL-MCNC: 2.5 G/DL (ref 3.5–5)
ALBUMIN/GLOB SERPL: 0.5 {RATIO} (ref 1.1–2.2)
ALP SERPL-CCNC: 368 U/L (ref 45–117)
ALT SERPL-CCNC: 179 U/L (ref 12–78)
ANION GAP SERPL CALC-SCNC: 7 MMOL/L (ref 5–15)
AST SERPL-CCNC: 214 U/L (ref 15–37)
ATRIAL RATE: 106 BPM
BASOPHILS # BLD: 0 K/UL (ref 0–0.1)
BASOPHILS NFR BLD: 0 % (ref 0–1)
BILIRUB SERPL-MCNC: 7.3 MG/DL (ref 0.2–1)
BUN SERPL-MCNC: 12 MG/DL (ref 6–20)
BUN/CREAT SERPL: 12 (ref 12–20)
CALCIUM SERPL-MCNC: 9.2 MG/DL (ref 8.5–10.1)
CALCULATED P AXIS, ECG09: 52 DEGREES
CALCULATED R AXIS, ECG10: -9 DEGREES
CALCULATED T AXIS, ECG11: 17 DEGREES
CHLORIDE SERPL-SCNC: 107 MMOL/L (ref 97–108)
CO2 SERPL-SCNC: 26 MMOL/L (ref 21–32)
COMMENT, HOLDF: NORMAL
CREAT SERPL-MCNC: 0.97 MG/DL (ref 0.55–1.02)
DIAGNOSIS, 93000: NORMAL
DIFFERENTIAL METHOD BLD: ABNORMAL
EOSINOPHIL # BLD: 0 K/UL (ref 0–0.4)
EOSINOPHIL NFR BLD: 0 % (ref 0–7)
ERYTHROCYTE [DISTWIDTH] IN BLOOD BY AUTOMATED COUNT: 17.2 % (ref 11.5–14.5)
GLOBULIN SER CALC-MCNC: 5.1 G/DL (ref 2–4)
GLUCOSE SERPL-MCNC: 116 MG/DL (ref 65–100)
HCT VFR BLD AUTO: 27.4 % (ref 35–47)
HGB BLD-MCNC: 8.5 G/DL (ref 11.5–16)
IMM GRANULOCYTES # BLD AUTO: 0.1 K/UL (ref 0–0.04)
IMM GRANULOCYTES NFR BLD AUTO: 1 % (ref 0–0.5)
LIPASE SERPL-CCNC: 480 U/L (ref 73–393)
LYMPHOCYTES # BLD: 1.1 K/UL (ref 0.8–3.5)
LYMPHOCYTES NFR BLD: 9 % (ref 12–49)
MCH RBC QN AUTO: 24.1 PG (ref 26–34)
MCHC RBC AUTO-ENTMCNC: 31 G/DL (ref 30–36.5)
MCV RBC AUTO: 77.8 FL (ref 80–99)
MONOCYTES # BLD: 1 K/UL (ref 0–1)
MONOCYTES NFR BLD: 9 % (ref 5–13)
NEUTS SEG # BLD: 9.8 K/UL (ref 1.8–8)
NEUTS SEG NFR BLD: 81 % (ref 32–75)
NRBC # BLD: 0 K/UL (ref 0–0.01)
NRBC BLD-RTO: 0 PER 100 WBC
P-R INTERVAL, ECG05: 142 MS
PLATELET # BLD AUTO: 408 K/UL (ref 150–400)
PMV BLD AUTO: 11.6 FL (ref 8.9–12.9)
POTASSIUM SERPL-SCNC: 3.3 MMOL/L (ref 3.5–5.1)
PROT SERPL-MCNC: 7.6 G/DL (ref 6.4–8.2)
Q-T INTERVAL, ECG07: 344 MS
QRS DURATION, ECG06: 80 MS
QTC CALCULATION (BEZET), ECG08: 456 MS
RBC # BLD AUTO: 3.52 M/UL (ref 3.8–5.2)
SAMPLES BEING HELD,HOLD: NORMAL
SODIUM SERPL-SCNC: 140 MMOL/L (ref 136–145)
TROPONIN I SERPL-MCNC: <0.05 NG/ML
TROPONIN I SERPL-MCNC: <0.05 NG/ML
VENTRICULAR RATE, ECG03: 106 BPM
WBC # BLD AUTO: 12 K/UL (ref 3.6–11)

## 2021-02-16 PROCEDURE — 99285 EMERGENCY DEPT VISIT HI MDM: CPT

## 2021-02-16 PROCEDURE — 74011000636 HC RX REV CODE- 636: Performed by: EMERGENCY MEDICINE

## 2021-02-16 PROCEDURE — 80177 DRUG SCRN QUAN LEVETIRACETAM: CPT

## 2021-02-16 PROCEDURE — 65270000032 HC RM SEMIPRIVATE

## 2021-02-16 PROCEDURE — 74011250637 HC RX REV CODE- 250/637: Performed by: FAMILY MEDICINE

## 2021-02-16 PROCEDURE — 85025 COMPLETE CBC W/AUTO DIFF WBC: CPT

## 2021-02-16 PROCEDURE — C9113 INJ PANTOPRAZOLE SODIUM, VIA: HCPCS | Performed by: FAMILY MEDICINE

## 2021-02-16 PROCEDURE — 80053 COMPREHEN METABOLIC PANEL: CPT

## 2021-02-16 PROCEDURE — 74011250636 HC RX REV CODE- 250/636: Performed by: FAMILY MEDICINE

## 2021-02-16 PROCEDURE — 83690 ASSAY OF LIPASE: CPT

## 2021-02-16 PROCEDURE — 96374 THER/PROPH/DIAG INJ IV PUSH: CPT

## 2021-02-16 PROCEDURE — 84484 ASSAY OF TROPONIN QUANT: CPT

## 2021-02-16 PROCEDURE — 36415 COLL VENOUS BLD VENIPUNCTURE: CPT

## 2021-02-16 PROCEDURE — 93005 ELECTROCARDIOGRAM TRACING: CPT

## 2021-02-16 PROCEDURE — 74177 CT ABD & PELVIS W/CONTRAST: CPT

## 2021-02-16 PROCEDURE — 74011000250 HC RX REV CODE- 250: Performed by: FAMILY MEDICINE

## 2021-02-16 PROCEDURE — 74011250636 HC RX REV CODE- 250/636: Performed by: EMERGENCY MEDICINE

## 2021-02-16 RX ORDER — OMEPRAZOLE 40 MG/1
40 CAPSULE, DELAYED RELEASE ORAL DAILY
COMMUNITY

## 2021-02-16 RX ORDER — GUAIFENESIN 100 MG/5ML
81 LIQUID (ML) ORAL DAILY
Status: DISCONTINUED | OUTPATIENT
Start: 2021-02-17 | End: 2021-02-18

## 2021-02-16 RX ORDER — MECLIZINE HYDROCHLORIDE 25 MG/1
25 TABLET ORAL
COMMUNITY
End: 2021-02-23

## 2021-02-16 RX ORDER — SODIUM CHLORIDE 9 MG/ML
75 INJECTION, SOLUTION INTRAVENOUS CONTINUOUS
Status: DISCONTINUED | OUTPATIENT
Start: 2021-02-16 | End: 2021-02-20

## 2021-02-16 RX ORDER — HYDRALAZINE HYDROCHLORIDE 20 MG/ML
10 INJECTION INTRAMUSCULAR; INTRAVENOUS
Status: DISCONTINUED | OUTPATIENT
Start: 2021-02-16 | End: 2021-02-23 | Stop reason: HOSPADM

## 2021-02-16 RX ORDER — ONDANSETRON 2 MG/ML
4 INJECTION INTRAMUSCULAR; INTRAVENOUS
Status: DISCONTINUED | OUTPATIENT
Start: 2021-02-16 | End: 2021-02-23 | Stop reason: HOSPADM

## 2021-02-16 RX ORDER — MECLIZINE HCL 12.5 MG 12.5 MG/1
25 TABLET ORAL
Status: DISCONTINUED | OUTPATIENT
Start: 2021-02-16 | End: 2021-02-23 | Stop reason: HOSPADM

## 2021-02-16 RX ORDER — SODIUM CHLORIDE 0.9 % (FLUSH) 0.9 %
5-40 SYRINGE (ML) INJECTION AS NEEDED
Status: DISCONTINUED | OUTPATIENT
Start: 2021-02-16 | End: 2021-02-23 | Stop reason: HOSPADM

## 2021-02-16 RX ORDER — PROMETHAZINE HYDROCHLORIDE 25 MG/1
25 TABLET ORAL
COMMUNITY

## 2021-02-16 RX ORDER — SODIUM CHLORIDE 0.9 % (FLUSH) 0.9 %
5-40 SYRINGE (ML) INJECTION EVERY 8 HOURS
Status: DISCONTINUED | OUTPATIENT
Start: 2021-02-16 | End: 2021-02-23 | Stop reason: HOSPADM

## 2021-02-16 RX ORDER — LOSARTAN POTASSIUM 50 MG/1
100 TABLET ORAL DAILY
Status: DISCONTINUED | OUTPATIENT
Start: 2021-02-17 | End: 2021-02-18

## 2021-02-16 RX ORDER — LEVETIRACETAM 500 MG/1
500 TABLET ORAL 2 TIMES DAILY
Status: DISCONTINUED | OUTPATIENT
Start: 2021-02-16 | End: 2021-02-23 | Stop reason: HOSPADM

## 2021-02-16 RX ORDER — GUAIFENESIN 100 MG/5ML
81 LIQUID (ML) ORAL DAILY
COMMUNITY
End: 2021-02-23

## 2021-02-16 RX ORDER — METOPROLOL TARTRATE 50 MG/1
100 TABLET ORAL 2 TIMES DAILY
Status: DISCONTINUED | OUTPATIENT
Start: 2021-02-16 | End: 2021-02-23 | Stop reason: HOSPADM

## 2021-02-16 RX ORDER — HYDROMORPHONE HYDROCHLORIDE 1 MG/ML
1 INJECTION, SOLUTION INTRAMUSCULAR; INTRAVENOUS; SUBCUTANEOUS
Status: DISCONTINUED | OUTPATIENT
Start: 2021-02-16 | End: 2021-02-18

## 2021-02-16 RX ORDER — MORPHINE SULFATE 2 MG/ML
2 INJECTION, SOLUTION INTRAMUSCULAR; INTRAVENOUS ONCE
Status: COMPLETED | OUTPATIENT
Start: 2021-02-16 | End: 2021-02-16

## 2021-02-16 RX ORDER — HYDRALAZINE HYDROCHLORIDE 50 MG/1
50 TABLET, FILM COATED ORAL 3 TIMES DAILY
Status: DISCONTINUED | OUTPATIENT
Start: 2021-02-16 | End: 2021-02-20

## 2021-02-16 RX ORDER — METFORMIN HYDROCHLORIDE 500 MG/1
500 TABLET ORAL 2 TIMES DAILY WITH MEALS
COMMUNITY
End: 2021-02-23

## 2021-02-16 RX ADMIN — MORPHINE SULFATE 2 MG: 2 INJECTION, SOLUTION INTRAMUSCULAR; INTRAVENOUS at 16:43

## 2021-02-16 RX ADMIN — HYDROMORPHONE HYDROCHLORIDE 1 MG: 1 INJECTION, SOLUTION INTRAMUSCULAR; INTRAVENOUS; SUBCUTANEOUS at 21:18

## 2021-02-16 RX ADMIN — SODIUM CHLORIDE 75 ML/HR: 9 INJECTION, SOLUTION INTRAVENOUS at 21:41

## 2021-02-16 RX ADMIN — IOPAMIDOL 100 ML: 755 INJECTION, SOLUTION INTRAVENOUS at 14:27

## 2021-02-16 RX ADMIN — Medication 10 ML: at 21:20

## 2021-02-16 RX ADMIN — LEVETIRACETAM 500 MG: 500 TABLET ORAL at 21:19

## 2021-02-16 RX ADMIN — METOPROLOL TARTRATE 100 MG: 50 TABLET, FILM COATED ORAL at 21:18

## 2021-02-16 RX ADMIN — HYDRALAZINE HYDROCHLORIDE 50 MG: 50 TABLET, FILM COATED ORAL at 21:19

## 2021-02-16 RX ADMIN — PANTOPRAZOLE SODIUM 40 MG: 40 INJECTION, POWDER, FOR SOLUTION INTRAVENOUS at 21:18

## 2021-02-16 NOTE — ED TRIAGE NOTES
Triage:  Pt to the ED due to concerns over continued upper abd pain, intermittent headache and nausea, family also states they have noted yellowing of her eyes.  0

## 2021-02-16 NOTE — H&P
History & Physical    Primary Care Provider: Tj Florence MD  Source of Information: Patient     History of Presenting Illness:   Sabino Cranker is a 80 y.o. female who presents with abdominal pain    History was obtained from the patient    Patient reports that for about a month now she has been experiencing increased abdominal pain associated with some nausea and vomiting. Patient's family members all so had been noticing worsening yellowing of her eyes and they got concerned and decided to bring the patient to the hospital.  No family is currently present at bedside and patient is somewhat of a poor historian. Patient reports that she feels excruciating pain in his belly. Reports that her urine color change to dark yellow and she has a very poor appetite. Patient came to the ER, was found to have a pancreatic mass and was requested to be admitted to the hospitalist service. The patient denies any Headache, blurry vision, sore throat, trouble swallowing, trouble with speech, chest pain, SOB, cough, fever, chills, , urinary symptoms, constipation, recent travels, sick contacts, focal or generalized neurological symptoms,  falls, injuries, rashes, contact with COVID-19 diagnosed patients, hematemesis, melena, hemoptysis, hematuria, rashes, denies starting any new medications and denies any other concerns or problems besides as mentioned above. Review of Systems:  A comprehensive review of systems was negative except for that written in the History of Present Illness. Past Medical History:   Diagnosis Date    [facility]     Hypertension     Myocardial infarction ([facility]     Seizure ([facility]     Stroke (cerebrum) ([facility] [address] [facility] 2019      No past surgical history on file. Prior to Admission medications    Medication Sig Start Date End Date Taking? Authorizing Provider   aspirin 81 mg chewable tablet Take 81 mg by mouth daily.    Yes Other, MD bhargavi Lang (ANTIVERT) 25 mg tablet Take 25 mg by mouth three (3) times daily as needed for Dizziness. Yes Rogers, MD Rickey   promethazine (PHENERGAN) 25 mg tablet Take 25 mg by mouth two (2) times daily as needed for Nausea. Yes Rogers, MD Rickey   omeprazole (PRILOSEC) 40 mg capsule Take 40 mg by mouth daily. Yes Rogers, MD Rickey   metFORMIN (GLUCOPHAGE) 500 mg tablet Take 500 mg by mouth two (2) times daily (with meals). Yes Rogers, MD Rickey   hydrALAZINE (APRESOLINE) 50 mg tablet Take 1 Tab by mouth three (3) times daily. 12/14/20  Yes Mariel Nicholson MD   metoprolol tartrate (LOPRESSOR) 100 mg IR tablet Take 1 Tab by mouth two (2) times a day. 12/14/20  Yes Mariel Nicholson MD   acetaminophen (TYLENOL) 325 mg tablet Take 2 Tabs by mouth every four (4) hours as needed for Pain or Fever. 12/14/20   Mariel Nicholson MD   levETIRAcetam (KEPPRA) 500 mg tablet Take 1 Tab by mouth two (2) times a day. 12/14/20   Mariel Nicholson MD   losartan (COZAAR) 100 mg tablet Take 1 Tab by mouth daily. 12/15/20   Mariel Nicholson MD   pantoprazole (PROTONIX) 40 mg tablet Take 1 Tab by mouth Daily (before breakfast). 12/15/20   Mariel Nicholson MD   traZODone (DESYREL) 50 mg tablet Take 1 Tab by mouth nightly. 12/14/20   Mariel Nicholson MD     No Known Allergies   History reviewed. No pertinent family history.      SOCIAL HISTORY:  Patient resides:  Independently x   Assisted Living    SNF    With family care       Smoking history:   None x   Former    Chronic      Alcohol history:   None    Social x   Chronic      Ambulates:   Independently x   w/cane    w/walker    w/wc    CODE STATUS:  DNR    Full x   Other      Objective:     Physical Exam:     Visit Vitals  BP (!) 164/87   Pulse 99   Temp 98.5 °F (36.9 °C)   Resp 18   Ht 5' 5\" (1.651 m)   Wt 90.7 kg (200 lb)   SpO2 99%   BMI 33.28 kg/m²      O2 Device: Room air    General : alert x 2, awake, moderately distressed, pleasant female, appears to be stated age, icteric sclera  HEENT: PEERL, EOMI, moist mucus membrane, TM clear  Neck: supple,   Chest: Clear to auscultation bilaterally   CVS: S1 S2 heard,   Abd: soft/tender to palpation diffusely/no rebound/mild guarding/bowel sounds were hypoactive  Ext: no clubbing, no cyanosis, no edema,  Neuro/Psych: pleasant mood and affect, patient not participating in exam, states her stomach hurts too much, DTR 1+ to 4  Skin: warm      EKG: Sinus tachycardia with nonspecific ST changes      Data Review:     Recent Days:  Recent Labs     02/16/21  1248   WBC 12.0*   HGB 8.5*   HCT 27.4*   *     Recent Labs     02/16/21  1248      K 3.3*      CO2 26   *   BUN 12   CREA 0.97   CA 9.2   ALB 2.5*   *     No results for input(s): PH, PCO2, PO2, HCO3, FIO2 in the last 72 hours.     24 Hour Results:  Recent Results (from the past 24 hour(s))   EKG, 12 LEAD, INITIAL    Collection Time: 02/16/21 12:41 PM   Result Value Ref Range    Ventricular Rate 106 BPM    Atrial Rate 106 BPM    P-R Interval 142 ms    QRS Duration 80 ms    Q-T Interval 344 ms    QTC Calculation (Bezet) 456 ms    Calculated P Axis 52 degrees    Calculated R Axis -9 degrees    Calculated T Axis 17 degrees    Diagnosis       Sinus tachycardia with premature supraventricular complexes  No previous ECGs available  Confirmed by Angi Murillo M.D., Margoth Espino ([id number]) on 2/16/2021 6:31:32 PM     METABOLIC PANEL, COMPREHENSIVE    Collection Time: 02/16/21 12:48 PM   Result Value Ref Range    Sodium 140 136 - 145 mmol/L    Potassium 3.3 (L) 3.5 - 5.1 mmol/L    Chloride 107 97 - 108 mmol/L    CO2 26 21 - 32 mmol/L    Anion gap 7 5 - 15 mmol/L    Glucose 116 (H) 65 - 100 mg/dL    BUN 12 6 - 20 MG/DL    Creatinine 0.97 0.55 - 1.02 MG/DL    BUN/Creatinine ratio 12 12 - 20      GFR est AA >60 >60 ml/min/1.73m2    GFR est non-AA 54 (L) >60 ml/min/1.73m2    Calcium 9.2 8.5 - 10.1 MG/DL    Bilirubin, total 7.3 (H) 0.2 - 1.0 MG/DL    ALT (SGPT) 179 (H) 12 - 78 U/L    AST (SGOT) 214 (H) 15 - 37 U/L    Alk. phosphatase 368 (H) 45 - 117 U/L    Protein, total 7.6 6.4 - 8.2 g/dL    Albumin 2.5 (L) 3.5 - 5.0 g/dL    Globulin 5.1 (H) 2.0 - 4.0 g/dL    A-G Ratio 0.5 (L) 1.1 - 2.2     LIPASE    Collection Time: 02/16/21 12:48 PM   Result Value Ref Range    Lipase 480 (H) 73 - 393 U/L   CBC WITH AUTOMATED DIFF    Collection Time: 02/16/21 12:48 PM   Result Value Ref Range    WBC 12.0 (H) 3.6 - 11.0 K/uL    RBC 3.52 (L) 3.80 - 5.20 M/uL    HGB 8.5 (L) 11.5 - 16.0 g/dL    HCT 27.4 (L) 35.0 - 47.0 %    MCV 77.8 (L) 80.0 - 99.0 FL    MCH 24.1 (L) 26.0 - 34.0 PG    MCHC 31.0 30.0 - 36.5 g/dL    RDW 17.2 (H) 11.5 - 14.5 %    PLATELET 568 (H) 900 - 400 K/uL    MPV 11.6 8.9 - 12.9 FL    NRBC 0.0 0  WBC    ABSOLUTE NRBC 0.00 0.00 - 0.01 K/uL    NEUTROPHILS 81 (H) 32 - 75 %    LYMPHOCYTES 9 (L) 12 - 49 %    MONOCYTES 9 5 - 13 %    EOSINOPHILS 0 0 - 7 %    BASOPHILS 0 0 - 1 %    IMMATURE GRANULOCYTES 1 (H) 0.0 - 0.5 %    ABS. NEUTROPHILS 9.8 (H) 1.8 - 8.0 K/UL    ABS. LYMPHOCYTES 1.1 0.8 - 3.5 K/UL    ABS. MONOCYTES 1.0 0.0 - 1.0 K/UL    ABS. EOSINOPHILS 0.0 0.0 - 0.4 K/UL    ABS. BASOPHILS 0.0 0.0 - 0.1 K/UL    ABS. IMM. GRANS. 0.1 (H) 0.00 - 0.04 K/UL    DF AUTOMATED     SAMPLES BEING HELD    Collection Time: 02/16/21 12:48 PM   Result Value Ref Range    SAMPLES BEING HELD 1RED,1BLU     COMMENT        Add-on orders for these samples will be processed based on acceptable specimen integrity and analyte stability, which may vary by analyte. TROPONIN I    Collection Time: 02/16/21 12:48 PM   Result Value Ref Range    Troponin-I, Qt. <0.05 <0.05 ng/mL         Imaging:   Ct Abd Pelv W Cont    Result Date: 2/16/2021  1. There is a 2 x 1.5 cm nodule left lung base which could represent metastatic disease versus primary neoplasm. 2. There is intrahepatic ductal dilatation. There is extrahepatic ductal dilatation.  There is a mass in the region of the pancreatic head extending cephalad obstructing the common duct. There is also dilatation of the pancreatic duct in the body and tail and there is a normal-sized pancreatic duct in the pancreatic head. There is slight narrowing of the portal vein. There is hepatogastric ligament adenopathy. There is retroperitoneal adenopathy Differential includes metastatic adenopathy obstructing the common duct and involving the pancreatic head versus neoplasm of the pancreatic head extending superiorly with retroperitoneal and hepatogastric ligament adenopathy. The gallbladder is distended and there are gallstones.     Assessment:     Pancreatic mass: Newly diagnosed, patient will be admitted on a surgical bed, highly concerning for metastatic malignancy, start patient on IV hydration, pain control, oncology and GI consult, trend LFTs, surveillance CT scans, supportive care and close monitoring, further intervention per hospital course, reassess as needed    Hypokalemia: Replace potassium, continue to monitor    Seizure disorder: Continue home medications, check Keppra levels, reassess as needed    Anemia: Unclear etiology, likely secondary to metastatic malignancy, stool for occult blood, Protonix twice daily, n.p.o., IV hydration, monitor H&H, transfuse as needed, further intervention per hospital course, continue to monitor and reassess as needed, aspirin on hold    Accelerated hypertension: Continue home medication hydralazine as needed, continue to monitor, reassess as needed    GI DVT prophylaxis: Patient will be on SCDs             Signed By: Liam Hale MD     February 16, 2021

## 2021-02-16 NOTE — ED NOTES
46 -Dr Ivey Billing notified of pt's teresa, cloudy urine output. 80 - Dr Ivey Billmarissa at bedside discussing CT results with pt.

## 2021-02-16 NOTE — SENIOR SERVICES NOTE
SSED Visit  Noted: TRST score of 5, HX breast cancer, CVA, HTN, CAD and seizure. Patient greeted while in [facility] while awaiting room. Patient hard of hearing, alert, poor historian- unsure of daily medications. No geriatric screenings done today, HX stroke of 12/2019. I will assess home needs and update medication list.    Maykel Quiñonez daughter, Alexis Campos #[phone] is patients POA, patient gave me verbal permission to call [employer]. I telephoned [employer], and she had actually just arrived in the ED, so I greeted her in the hallway with the patient. Kirti was able to perform the medication reconciliation with me, however only with the medications that she brought in a bag from home. Per Kirti, a close friend by the name of Dc Patten lives with the patient and she handles all medication administration. [facility] endorses that she sees the patient daily to check in on her and assists with all transportation needs. [facility] endorses that the patient has not had an recent falls or difficulty ambulating- uses a cane and rolling walker at home. Abdominal pain x 2 weeks with decreased appetite, denies seeing PCP recently for this pain, patient stating that her PCP was not \"seeing patients in person. \"   Patient is currently receiving in [address] [place] [address] by RN and PT/OT- 2 times a week/ Kirti is unsure of company name. [facility] expresses that they have everything they need at this time in the home. I will collaborate findings with Dr. Dennis Chiu and the [place] [id number] team for any further needs. Lizbeth Mcnulty NP  SSED  4:28 PM    **Upon performing/ updating Medication Reconciliation, there were several medications that Kirti did not mention. I spoke with Vania Fontanez (patients nurse) and asked her to verify with Kirti once she returned.

## 2021-02-16 NOTE — ED PROVIDER NOTES
Pt is a 81 yo female with hx of breast cancer, CVA, hypertension, CAD, seizure who presents with periumbilical abdominal pain 'for some time now'. No acute worsening today. Per family, they noticed her eyes were yellow and brought her into the ER for work up. Pt denies N/V. No change in bowel habits. No urinary or vaginal symptoms. Past Medical History:   Diagnosis Date    [facility] ([facility] [address]     Hypertension     Myocardial infarction ([facility] [address] [facility]     Seizure ([facility] [address] [facility]     Stroke (cerebrum) ([facility] [address] 2019       No past surgical history on file. History reviewed. No pertinent family history.     Social History     Socioeconomic History    Marital status:      Spouse name: Not on file    Number of children: Not on file    Years of education: Not on file    Highest education level: Not on file   Occupational History    Not on file   Social Needs    Financial resource strain: Not on file    Food insecurity     Worry: Not on file     Inability: Not on file    Transportation needs     Medical: Not on file     Non-medical: Not on file   Tobacco Use    Smoking status: Never Smoker    Smokeless tobacco: Never Used   Substance and Sexual Activity    Alcohol use: Never     Frequency: Never    Drug use: Never    Sexual activity: Not on file   Lifestyle    Physical activity     Days per week: Not on file     Minutes per session: Not on file    Stress: Not on file   Relationships    Social connections     Talks on phone: Not on file     Gets together: Not on file     Attends [employer] service: Not on file     Active member of club or organization: Not on file     Attends meetings of clubs or organizations: Not on file     Relationship status: Not on file    Intimate partner violence     Fear of current or ex partner: Not on file     Emotionally abused: Not on file     Physically abused: Not on file     Forced sexual activity: Not on file   Other Topics Concern    Not on file   Social History Narrative    Not on file         ALLERGIES: Patient has no known allergies. Review of Systems   Constitutional: Negative for chills and fever. HENT: Negative for drooling and nosebleeds. Eyes: Negative for pain and itching. Respiratory: Negative for choking and stridor. Cardiovascular: Negative for leg swelling. Gastrointestinal: Positive for abdominal pain. Negative for abdominal distention and rectal pain. Endocrine: Negative for heat intolerance and polyphagia. Genitourinary: Negative for enuresis and genital sores. Musculoskeletal: Negative for arthralgias and joint swelling. Skin: Negative for color change. Allergic/Immunologic: Negative for immunocompromised state. Neurological: Negative for tremors and speech difficulty. Hematological: Negative for adenopathy. Psychiatric/Behavioral: Negative for dysphoric mood and sleep disturbance. Vitals:    02/16/21 1231   BP: (!) 174/79   Pulse: (!) 109   Resp: 18   Temp: 97.9 °F (36.6 °C)   SpO2: 96%   Weight: 90.7 kg (200 lb)   Height: 5' 5\" (1.651 m)            Physical Exam  Vitals signs and nursing note reviewed. Constitutional:       General: She is not in acute distress. Appearance: She is well-developed. She is not ill-appearing, toxic-appearing or diaphoretic. HENT:      Head: Normocephalic and atraumatic. Nose: Nose normal.   Eyes:      Conjunctiva/sclera: Conjunctivae normal.   Neck:      Musculoskeletal: Normal range of motion and neck supple. Cardiovascular:      Rate and Rhythm: Regular rhythm. Heart sounds: Normal heart sounds. Pulmonary:      Effort: Pulmonary effort is normal. No respiratory distress. Breath sounds: Normal breath sounds. Abdominal:      General: There is no distension. Palpations: Abdomen is soft. Tenderness: There is abdominal tenderness in the periumbilical area. There is no guarding or rebound. Musculoskeletal: Normal range of motion.          General: No deformity. Skin:     General: Skin is warm and dry. Neurological:      Mental Status: She is alert and oriented to person, place, and time. Coordination: Coordination normal.   Psychiatric:         Behavior: Behavior normal.          MDM  Number of Diagnoses or Management Options  Hyperbilirubinemia  Pancreatic mass  Transaminitis  Diagnosis management comments: Pt resting in no distress. Reports chronic abdominal pain, unable to tell me when it started. Was not aware that she appeared jaundiced to family. Discussed findings on CT scan. Pt is amenable to admission for further work up. Amount and/or Complexity of Data Reviewed  Decide to obtain previous medical records or to obtain history from someone other than the patient: yes           Procedures    Perfect Serve Consult for Admission  5:23 PM    ED Room Number: ER19/19  Patient Name and age:  Wayne Cutler 80 y.o.  female  Working Diagnosis:   1. Pancreatic mass    2. Transaminitis    3. Hyperbilirubinemia        COVID-19 Suspicion:  no  Sepsis present:  no  Reassessment needed: no  Code Status:  Full Code  Readmission: no  Isolation Requirements:  no  Recommended Level of Care:  telemetry  Department:[facility] Adult ED - 21   Other:   Pt with periumbilical pain and new jaundice. Pancreatic mass on CT scan. Patient is being admitted to the hospital.  The results of their tests and reasons for their admission have been discussed with them and/or available family. They convey agreement and understanding for the need to be admitted and for their admission diagnosis.

## 2021-02-16 NOTE — PROGRESS NOTES
Admission Medication Reconciliation: In progress:    Unable to speak with patient face to face at this time due to general isolation precautions in the ED related to COVID-19 pandemic. Attempted to call Fernanda Luna @ [phone] (chart notes indicate that she resides with patient)-voicemail box is full. Note that Dr. Ary Dixon has already completed portion of medication history interview today. I will update PTA med list and post progress note if additional information is obtained. Thank you for allowing me to participate in the care of your patient. Mckenzie KhannaD, RN # [phone]     [facility] pharmacy benefit data reflects medications filled and processed through the patient's insurance, however   this data does NOT capture whether the medication was picked up or is currently being taken by the patient. Allergies:  Patient has no known allergies. Significant PMH/Disease States:   Past Medical History:   Diagnosis Date    [facility] ([facility] [address] [facility]     Hypertension     Myocardial infarction ([facility] [address] [facility]     Seizure ([facility] [address] [facility]     Stroke (cerebrum) ([facility] [address] 2019     Chief Complaint for this Admission:    Chief Complaint   Patient presents with    Abdominal Pain     Prior to Admission Medications:   Prior to Admission Medications   Prescriptions Last Dose Informant Taking?   acetaminophen (TYLENOL) 325 mg tablet Unknown at Unknown time  No   Sig: Take 2 Tabs by mouth every four (4) hours as needed for Pain or Fever. aspirin 81 mg chewable tablet 2/16/2021 at 0900  Yes   Sig: Take 81 mg by mouth daily. hydrALAZINE (APRESOLINE) 50 mg tablet 2/16/2021 at 0900  Yes   Sig: Take 1 Tab by mouth three (3) times daily. levETIRAcetam (KEPPRA) 500 mg tablet Unknown at Unknown time  No   Sig: Take 1 Tab by mouth two (2) times a day. losartan (COZAAR) 100 mg tablet Unknown at Unknown time  No   Sig: Take 1 Tab by mouth daily.    meclizine (ANTIVERT) 25 mg tablet 2/16/2021 at 0900  Yes   Sig: Take 25 mg by mouth three (3) times daily as needed for Dizziness. metFORMIN (GLUCOPHAGE) 500 mg tablet 2/16/2021 at 0900  Yes   Sig: Take 500 mg by mouth two (2) times daily (with meals). metoprolol tartrate (LOPRESSOR) 100 mg IR tablet 2/16/2021 at 0900  Yes   Sig: Take 1 Tab by mouth two (2) times a day. omeprazole (PRILOSEC) 40 mg capsule 2/16/2021 at 0900  Yes   Sig: Take 40 mg by mouth daily. pantoprazole (PROTONIX) 40 mg tablet Unknown at Unknown time  No   Sig: Take 1 Tab by mouth Daily (before breakfast). promethazine (PHENERGAN) 25 mg tablet 2/16/2021 at 0900  Yes   Sig: Take 25 mg by mouth two (2) times daily as needed for Nausea. traZODone (DESYREL) 50 mg tablet Unknown at Unknown time  No   Sig: Take 1 Tab by mouth nightly. Facility-Administered Medications: None     Please contact the main inpatient pharmacy with any questions or concerns at [phone] and we will direct you to the clinical pharmacist covering this patient's care while in-house.    CALE Munoz

## 2021-02-17 ENCOUNTER — APPOINTMENT (OUTPATIENT)
Dept: ULTRASOUND IMAGING | Age: 86
DRG: 423 | End: 2021-02-17
Attending: INTERNAL MEDICINE
Payer: MEDICARE

## 2021-02-17 ENCOUNTER — APPOINTMENT (OUTPATIENT)
Dept: GENERAL RADIOLOGY | Age: 86
DRG: 423 | End: 2021-02-17
Attending: INTERNAL MEDICINE
Payer: MEDICARE

## 2021-02-17 ENCOUNTER — ANESTHESIA (OUTPATIENT)
Dept: ENDOSCOPY | Age: 86
DRG: 423 | End: 2021-02-17
Payer: MEDICARE

## 2021-02-17 ENCOUNTER — APPOINTMENT (OUTPATIENT)
Dept: CT IMAGING | Age: 86
DRG: 423 | End: 2021-02-17
Attending: INTERNAL MEDICINE
Payer: MEDICARE

## 2021-02-17 ENCOUNTER — DOCUMENTATION ONLY (OUTPATIENT)
Dept: CASE MANAGEMENT | Age: 86
End: 2021-02-17

## 2021-02-17 ENCOUNTER — ANESTHESIA EVENT (OUTPATIENT)
Dept: ENDOSCOPY | Age: 86
DRG: 423 | End: 2021-02-17
Payer: MEDICARE

## 2021-02-17 LAB
ALBUMIN SERPL-MCNC: 2.2 G/DL (ref 3.5–5)
ALBUMIN/GLOB SERPL: 0.5 {RATIO} (ref 1.1–2.2)
ALP SERPL-CCNC: 348 U/L (ref 45–117)
ALT SERPL-CCNC: 153 U/L (ref 12–78)
ANION GAP SERPL CALC-SCNC: 8 MMOL/L (ref 5–15)
AST SERPL-CCNC: 166 U/L (ref 15–37)
BASOPHILS # BLD: 0 K/UL (ref 0–0.1)
BASOPHILS NFR BLD: 0 % (ref 0–1)
BILIRUB SERPL-MCNC: 7.1 MG/DL (ref 0.2–1)
BUN SERPL-MCNC: 10 MG/DL (ref 6–20)
BUN/CREAT SERPL: 13 (ref 12–20)
CALCIUM SERPL-MCNC: 8.7 MG/DL (ref 8.5–10.1)
CHLORIDE SERPL-SCNC: 107 MMOL/L (ref 97–108)
CO2 SERPL-SCNC: 25 MMOL/L (ref 21–32)
COVID-19 RAPID TEST, COVR: NOT DETECTED
CREAT SERPL-MCNC: 0.78 MG/DL (ref 0.55–1.02)
DIFFERENTIAL METHOD BLD: ABNORMAL
EOSINOPHIL # BLD: 0.1 K/UL (ref 0–0.4)
EOSINOPHIL NFR BLD: 0 % (ref 0–7)
ERYTHROCYTE [DISTWIDTH] IN BLOOD BY AUTOMATED COUNT: 17 % (ref 11.5–14.5)
GLOBULIN SER CALC-MCNC: 4.6 G/DL (ref 2–4)
GLUCOSE SERPL-MCNC: 98 MG/DL (ref 65–100)
HAV IGM SER QL: NONREACTIVE
HBV CORE IGM SER QL: NONREACTIVE
HBV SURFACE AG SER QL: <0.1 INDEX
HBV SURFACE AG SER QL: NEGATIVE
HCT VFR BLD AUTO: 23.5 % (ref 35–47)
HCV AB SERPL QL IA: NONREACTIVE
HCV COMMENT,HCGAC: NORMAL
HGB BLD-MCNC: 7.2 G/DL (ref 11.5–16)
IMM GRANULOCYTES # BLD AUTO: 0.1 K/UL (ref 0–0.04)
IMM GRANULOCYTES NFR BLD AUTO: 1 % (ref 0–0.5)
LEVETIRACETAM SERPL-MCNC: 17.7 UG/ML (ref 10–40)
LYMPHOCYTES # BLD: 0.9 K/UL (ref 0.8–3.5)
LYMPHOCYTES NFR BLD: 8 % (ref 12–49)
MCH RBC QN AUTO: 23.9 PG (ref 26–34)
MCHC RBC AUTO-ENTMCNC: 30.6 G/DL (ref 30–36.5)
MCV RBC AUTO: 78.1 FL (ref 80–99)
MONOCYTES # BLD: 1.1 K/UL (ref 0–1)
MONOCYTES NFR BLD: 10 % (ref 5–13)
NEUTS SEG # BLD: 9.1 K/UL (ref 1.8–8)
NEUTS SEG NFR BLD: 81 % (ref 32–75)
NRBC # BLD: 0 K/UL (ref 0–0.01)
NRBC BLD-RTO: 0 PER 100 WBC
PLATELET # BLD AUTO: 318 K/UL (ref 150–400)
PMV BLD AUTO: 11.8 FL (ref 8.9–12.9)
POTASSIUM SERPL-SCNC: 2.5 MMOL/L (ref 3.5–5.1)
POTASSIUM SERPL-SCNC: 2.7 MMOL/L (ref 3.5–5.1)
PROT SERPL-MCNC: 6.8 G/DL (ref 6.4–8.2)
RBC # BLD AUTO: 3.01 M/UL (ref 3.8–5.2)
SARS-COV-2, COV2: NORMAL
SODIUM SERPL-SCNC: 140 MMOL/L (ref 136–145)
SOURCE, COVRS: NORMAL
SP1: NORMAL
SP2: NORMAL
SP3: NORMAL
TROPONIN I SERPL-MCNC: 0.07 NG/ML
WBC # BLD AUTO: 11.3 K/UL (ref 3.6–11)

## 2021-02-17 PROCEDURE — 2709999900 HC NON-CHARGEABLE SUPPLY: Performed by: INTERNAL MEDICINE

## 2021-02-17 PROCEDURE — 77030012596 HC SPHNTOM BILI BSC -E: Performed by: INTERNAL MEDICINE

## 2021-02-17 PROCEDURE — 07BB4ZX EXCISION OF MESENTERIC LYMPHATIC, PERCUTANEOUS ENDOSCOPIC APPROACH, DIAGNOSTIC: ICD-10-PCS | Performed by: INTERNAL MEDICINE

## 2021-02-17 PROCEDURE — 77030034509 HC NDL ENDOSC BNX ASPIR SYS COVD -D: Performed by: INTERNAL MEDICINE

## 2021-02-17 PROCEDURE — 88305 TISSUE EXAM BY PATHOLOGIST: CPT

## 2021-02-17 PROCEDURE — 77030008684 HC TU ET CUF COVD -B: Performed by: NURSE ANESTHETIST, CERTIFIED REGISTERED

## 2021-02-17 PROCEDURE — 36415 COLL VENOUS BLD VENIPUNCTURE: CPT

## 2021-02-17 PROCEDURE — 85025 COMPLETE CBC W/AUTO DIFF WBC: CPT

## 2021-02-17 PROCEDURE — 84484 ASSAY OF TROPONIN QUANT: CPT

## 2021-02-17 PROCEDURE — 74011250636 HC RX REV CODE- 250/636: Performed by: NURSE ANESTHETIST, CERTIFIED REGISTERED

## 2021-02-17 PROCEDURE — 99222 1ST HOSP IP/OBS MODERATE 55: CPT | Performed by: INTERNAL MEDICINE

## 2021-02-17 PROCEDURE — 77030040361 HC SLV COMPR DVT MDII -B: Performed by: INTERNAL MEDICINE

## 2021-02-17 PROCEDURE — 76060000034 HC ANESTHESIA 1.5 TO 2 HR: Performed by: INTERNAL MEDICINE

## 2021-02-17 PROCEDURE — 88177 CYTP FNA EVAL EA ADDL: CPT

## 2021-02-17 PROCEDURE — 74011250636 HC RX REV CODE- 250/636: Performed by: NURSE PRACTITIONER

## 2021-02-17 PROCEDURE — 65270000032 HC RM SEMIPRIVATE

## 2021-02-17 PROCEDURE — 74011000250 HC RX REV CODE- 250: Performed by: NURSE ANESTHETIST, CERTIFIED REGISTERED

## 2021-02-17 PROCEDURE — 80053 COMPREHEN METABOLIC PANEL: CPT

## 2021-02-17 PROCEDURE — 70450 CT HEAD/BRAIN W/O DYE: CPT

## 2021-02-17 PROCEDURE — 87635 SARS-COV-2 COVID-19 AMP PRB: CPT

## 2021-02-17 PROCEDURE — 84132 ASSAY OF SERUM POTASSIUM: CPT

## 2021-02-17 PROCEDURE — C2625 STENT, NON-COR, TEM W/DEL SY: HCPCS | Performed by: INTERNAL MEDICINE

## 2021-02-17 PROCEDURE — 74011000250 HC RX REV CODE- 250: Performed by: FAMILY MEDICINE

## 2021-02-17 PROCEDURE — 76040000009: Performed by: INTERNAL MEDICINE

## 2021-02-17 PROCEDURE — 80074 ACUTE HEPATITIS PANEL: CPT

## 2021-02-17 PROCEDURE — 74011000636 HC RX REV CODE- 636: Performed by: INTERNAL MEDICINE

## 2021-02-17 PROCEDURE — C9113 INJ PANTOPRAZOLE SODIUM, VIA: HCPCS | Performed by: FAMILY MEDICINE

## 2021-02-17 PROCEDURE — 77030007288 HC DEV LOK BILI BSC -A: Performed by: INTERNAL MEDICINE

## 2021-02-17 PROCEDURE — 74011250637 HC RX REV CODE- 250/637: Performed by: INTERNAL MEDICINE

## 2021-02-17 PROCEDURE — 74011250636 HC RX REV CODE- 250/636: Performed by: FAMILY MEDICINE

## 2021-02-17 PROCEDURE — 0F798DZ DILATION OF COMMON BILE DUCT WITH INTRALUMINAL DEVICE, VIA NATURAL OR ARTIFICIAL OPENING ENDOSCOPIC: ICD-10-PCS | Performed by: INTERNAL MEDICINE

## 2021-02-17 PROCEDURE — 77030026438 HC STYL ET INTUB CARD -A: Performed by: NURSE ANESTHETIST, CERTIFIED REGISTERED

## 2021-02-17 PROCEDURE — 74011250637 HC RX REV CODE- 250/637: Performed by: FAMILY MEDICINE

## 2021-02-17 PROCEDURE — 88172 CYTP DX EVAL FNA 1ST EA SITE: CPT

## 2021-02-17 PROCEDURE — 88173 CYTOPATH EVAL FNA REPORT: CPT

## 2021-02-17 PROCEDURE — 77030036672 HC NDL BIOP ENDOSC SHRKCOR COVD -D: Performed by: INTERNAL MEDICINE

## 2021-02-17 PROCEDURE — 0FBG8ZX EXCISION OF PANCREAS, VIA NATURAL OR ARTIFICIAL OPENING ENDOSCOPIC, DIAGNOSTIC: ICD-10-PCS | Performed by: INTERNAL MEDICINE

## 2021-02-17 PROCEDURE — 71250 CT THORAX DX C-: CPT

## 2021-02-17 DEVICE — BILIARY STENT WITH NAVIFLEXTM RX DELIVERY SYSTEM
Type: IMPLANTABLE DEVICE | Site: BILE DUCT | Status: FUNCTIONAL
Brand: ADVANIX™ BILIARY

## 2021-02-17 RX ORDER — CEFAZOLIN SODIUM 1 G/3ML
INJECTION, POWDER, FOR SOLUTION INTRAMUSCULAR; INTRAVENOUS AS NEEDED
Status: DISCONTINUED | OUTPATIENT
Start: 2021-02-17 | End: 2021-02-17 | Stop reason: HOSPADM

## 2021-02-17 RX ORDER — SODIUM CHLORIDE 0.9 % (FLUSH) 0.9 %
5-40 SYRINGE (ML) INJECTION AS NEEDED
Status: DISCONTINUED | OUTPATIENT
Start: 2021-02-17 | End: 2021-02-23 | Stop reason: HOSPADM

## 2021-02-17 RX ORDER — CEFAZOLIN SODIUM 1 G/3ML
INJECTION, POWDER, FOR SOLUTION INTRAMUSCULAR; INTRAVENOUS
Status: COMPLETED
Start: 2021-02-17 | End: 2021-02-17

## 2021-02-17 RX ORDER — SUCCINYLCHOLINE CHLORIDE 20 MG/ML
INJECTION INTRAMUSCULAR; INTRAVENOUS AS NEEDED
Status: DISCONTINUED | OUTPATIENT
Start: 2021-02-17 | End: 2021-02-17 | Stop reason: HOSPADM

## 2021-02-17 RX ORDER — PROPOFOL 10 MG/ML
INJECTION, EMULSION INTRAVENOUS AS NEEDED
Status: DISCONTINUED | OUTPATIENT
Start: 2021-02-17 | End: 2021-02-17 | Stop reason: HOSPADM

## 2021-02-17 RX ORDER — ONDANSETRON 2 MG/ML
INJECTION INTRAMUSCULAR; INTRAVENOUS AS NEEDED
Status: DISCONTINUED | OUTPATIENT
Start: 2021-02-17 | End: 2021-02-17 | Stop reason: HOSPADM

## 2021-02-17 RX ORDER — POTASSIUM CHLORIDE 7.45 MG/ML
10 INJECTION INTRAVENOUS
Status: COMPLETED | OUTPATIENT
Start: 2021-02-17 | End: 2021-02-17

## 2021-02-17 RX ORDER — LIDOCAINE HYDROCHLORIDE 20 MG/ML
INJECTION, SOLUTION EPIDURAL; INFILTRATION; INTRACAUDAL; PERINEURAL AS NEEDED
Status: DISCONTINUED | OUTPATIENT
Start: 2021-02-17 | End: 2021-02-17 | Stop reason: HOSPADM

## 2021-02-17 RX ORDER — DEXTROMETHORPHAN/PSEUDOEPHED 2.5-7.5/.8
1.2 DROPS ORAL
Status: DISCONTINUED | OUTPATIENT
Start: 2021-02-17 | End: 2021-02-17 | Stop reason: HOSPADM

## 2021-02-17 RX ORDER — NALOXONE HYDROCHLORIDE 0.4 MG/ML
0.4 INJECTION, SOLUTION INTRAMUSCULAR; INTRAVENOUS; SUBCUTANEOUS
Status: DISCONTINUED | OUTPATIENT
Start: 2021-02-17 | End: 2021-02-17 | Stop reason: HOSPADM

## 2021-02-17 RX ORDER — POTASSIUM CHLORIDE 750 MG/1
40 TABLET, FILM COATED, EXTENDED RELEASE ORAL
Status: COMPLETED | OUTPATIENT
Start: 2021-02-17 | End: 2021-02-17

## 2021-02-17 RX ORDER — SODIUM CHLORIDE, SODIUM LACTATE, POTASSIUM CHLORIDE, CALCIUM CHLORIDE 600; 310; 30; 20 MG/100ML; MG/100ML; MG/100ML; MG/100ML
INJECTION, SOLUTION INTRAVENOUS
Status: DISCONTINUED | OUTPATIENT
Start: 2021-02-17 | End: 2021-02-17 | Stop reason: HOSPADM

## 2021-02-17 RX ORDER — SODIUM CHLORIDE 0.9 % (FLUSH) 0.9 %
5-40 SYRINGE (ML) INJECTION EVERY 8 HOURS
Status: DISCONTINUED | OUTPATIENT
Start: 2021-02-17 | End: 2021-02-23 | Stop reason: HOSPADM

## 2021-02-17 RX ORDER — DEXMEDETOMIDINE HYDROCHLORIDE 100 UG/ML
INJECTION, SOLUTION INTRAVENOUS AS NEEDED
Status: DISCONTINUED | OUTPATIENT
Start: 2021-02-17 | End: 2021-02-17 | Stop reason: HOSPADM

## 2021-02-17 RX ORDER — ATROPINE SULFATE 0.1 MG/ML
0.5 INJECTION INTRAVENOUS
Status: DISCONTINUED | OUTPATIENT
Start: 2021-02-17 | End: 2021-02-17 | Stop reason: HOSPADM

## 2021-02-17 RX ORDER — LABETALOL HYDROCHLORIDE 5 MG/ML
INJECTION, SOLUTION INTRAVENOUS AS NEEDED
Status: DISCONTINUED | OUTPATIENT
Start: 2021-02-17 | End: 2021-02-17 | Stop reason: HOSPADM

## 2021-02-17 RX ORDER — EPINEPHRINE 0.1 MG/ML
1 INJECTION INTRACARDIAC; INTRAVENOUS
Status: DISCONTINUED | OUTPATIENT
Start: 2021-02-17 | End: 2021-02-17 | Stop reason: HOSPADM

## 2021-02-17 RX ORDER — DEXAMETHASONE SODIUM PHOSPHATE 4 MG/ML
INJECTION, SOLUTION INTRA-ARTICULAR; INTRALESIONAL; INTRAMUSCULAR; INTRAVENOUS; SOFT TISSUE AS NEEDED
Status: DISCONTINUED | OUTPATIENT
Start: 2021-02-17 | End: 2021-02-17 | Stop reason: HOSPADM

## 2021-02-17 RX ORDER — FLUMAZENIL 0.1 MG/ML
0.2 INJECTION INTRAVENOUS
Status: DISCONTINUED | OUTPATIENT
Start: 2021-02-17 | End: 2021-02-17 | Stop reason: HOSPADM

## 2021-02-17 RX ORDER — ROCURONIUM BROMIDE 10 MG/ML
INJECTION, SOLUTION INTRAVENOUS AS NEEDED
Status: DISCONTINUED | OUTPATIENT
Start: 2021-02-17 | End: 2021-02-17 | Stop reason: HOSPADM

## 2021-02-17 RX ORDER — PHENYLEPHRINE HCL IN 0.9% NACL 0.4MG/10ML
SYRINGE (ML) INTRAVENOUS AS NEEDED
Status: DISCONTINUED | OUTPATIENT
Start: 2021-02-17 | End: 2021-02-17 | Stop reason: HOSPADM

## 2021-02-17 RX ORDER — SEVOFLURANE 250 ML/250ML
LIQUID RESPIRATORY (INHALATION)
Status: DISPENSED
Start: 2021-02-17 | End: 2021-02-18

## 2021-02-17 RX ADMIN — DEXMEDETOMIDINE HYDROCHLORIDE 4 MCG: 100 INJECTION, SOLUTION, CONCENTRATE INTRAVENOUS at 15:53

## 2021-02-17 RX ADMIN — Medication 80 MCG: at 16:12

## 2021-02-17 RX ADMIN — LABETALOL HYDROCHLORIDE 5 MG: 5 INJECTION INTRAVENOUS at 17:22

## 2021-02-17 RX ADMIN — HYDRALAZINE HYDROCHLORIDE 50 MG: 50 TABLET, FILM COATED ORAL at 18:46

## 2021-02-17 RX ADMIN — SUCCINYLCHOLINE CHLORIDE 120 MG: 20 INJECTION, SOLUTION INTRAMUSCULAR; INTRAVENOUS; PARENTERAL at 15:40

## 2021-02-17 RX ADMIN — PANTOPRAZOLE SODIUM 40 MG: 40 INJECTION, POWDER, FOR SOLUTION INTRAVENOUS at 18:45

## 2021-02-17 RX ADMIN — DEXMEDETOMIDINE HYDROCHLORIDE 4 MCG: 100 INJECTION, SOLUTION, CONCENTRATE INTRAVENOUS at 16:00

## 2021-02-17 RX ADMIN — CEFAZOLIN 2 G: 330 INJECTION, POWDER, FOR SOLUTION INTRAMUSCULAR; INTRAVENOUS at 17:06

## 2021-02-17 RX ADMIN — PROPOFOL 20 MG: 10 INJECTION, EMULSION INTRAVENOUS at 16:00

## 2021-02-17 RX ADMIN — Medication 10 ML: at 18:45

## 2021-02-17 RX ADMIN — Medication 80 MCG: at 16:14

## 2021-02-17 RX ADMIN — Medication 80 MCG: at 16:19

## 2021-02-17 RX ADMIN — POTASSIUM CHLORIDE 10 MEQ: 10 INJECTION, SOLUTION INTRAVENOUS at 14:25

## 2021-02-17 RX ADMIN — DEXAMETHASONE SODIUM PHOSPHATE 4 MG: 4 INJECTION, SOLUTION INTRAMUSCULAR; INTRAVENOUS at 15:55

## 2021-02-17 RX ADMIN — POTASSIUM CHLORIDE 10 MEQ: 10 INJECTION, SOLUTION INTRAVENOUS at 10:00

## 2021-02-17 RX ADMIN — SUCCINYLCHOLINE CHLORIDE 60 MG: 20 INJECTION, SOLUTION INTRAMUSCULAR; INTRAVENOUS; PARENTERAL at 16:10

## 2021-02-17 RX ADMIN — POTASSIUM CHLORIDE 10 MEQ: 10 INJECTION, SOLUTION INTRAVENOUS at 07:30

## 2021-02-17 RX ADMIN — SODIUM CHLORIDE, POTASSIUM CHLORIDE, SODIUM LACTATE AND CALCIUM CHLORIDE: 600; 310; 30; 20 INJECTION, SOLUTION INTRAVENOUS at 15:22

## 2021-02-17 RX ADMIN — HYDRALAZINE HYDROCHLORIDE 50 MG: 50 TABLET, FILM COATED ORAL at 22:29

## 2021-02-17 RX ADMIN — PROPOFOL 150 MG: 10 INJECTION, EMULSION INTRAVENOUS at 15:40

## 2021-02-17 RX ADMIN — DEXMEDETOMIDINE HYDROCHLORIDE 4 MCG: 100 INJECTION, SOLUTION, CONCENTRATE INTRAVENOUS at 15:55

## 2021-02-17 RX ADMIN — Medication 80 MCG: at 16:24

## 2021-02-17 RX ADMIN — PROPOFOL 30 MG: 10 INJECTION, EMULSION INTRAVENOUS at 16:01

## 2021-02-17 RX ADMIN — ROCURONIUM BROMIDE 5 MG: 10 SOLUTION INTRAVENOUS at 15:40

## 2021-02-17 RX ADMIN — METOPROLOL TARTRATE 100 MG: 50 TABLET, FILM COATED ORAL at 18:42

## 2021-02-17 RX ADMIN — SUCCINYLCHOLINE CHLORIDE 20 MG: 20 INJECTION, SOLUTION INTRAMUSCULAR; INTRAVENOUS; PARENTERAL at 16:18

## 2021-02-17 RX ADMIN — Medication 10 ML: at 18:44

## 2021-02-17 RX ADMIN — GLUCAGON HYDROCHLORIDE 0.5 MG: KIT at 16:56

## 2021-02-17 RX ADMIN — POTASSIUM CHLORIDE 10 MEQ: 10 INJECTION, SOLUTION INTRAVENOUS at 20:28

## 2021-02-17 RX ADMIN — LEVETIRACETAM 500 MG: 500 TABLET ORAL at 18:43

## 2021-02-17 RX ADMIN — LIDOCAINE HYDROCHLORIDE 100 MG: 20 INJECTION, SOLUTION EPIDURAL; INFILTRATION; INTRACAUDAL; PERINEURAL at 15:40

## 2021-02-17 RX ADMIN — ONDANSETRON HYDROCHLORIDE 4 MG: 2 INJECTION, SOLUTION INTRAMUSCULAR; INTRAVENOUS at 15:55

## 2021-02-17 RX ADMIN — SUCCINYLCHOLINE CHLORIDE 20 MG: 20 INJECTION, SOLUTION INTRAMUSCULAR; INTRAVENOUS; PARENTERAL at 16:25

## 2021-02-17 RX ADMIN — SUCCINYLCHOLINE CHLORIDE 20 MG: 20 INJECTION, SOLUTION INTRAMUSCULAR; INTRAVENOUS; PARENTERAL at 16:32

## 2021-02-17 RX ADMIN — PROPOFOL 30 MG: 10 INJECTION, EMULSION INTRAVENOUS at 15:51

## 2021-02-17 RX ADMIN — POTASSIUM CHLORIDE 40 MEQ: 750 TABLET, FILM COATED, EXTENDED RELEASE ORAL at 18:44

## 2021-02-17 NOTE — ROUTINE PROCESS
TRANSFER - OUT REPORT:    Verbal report given to Jenn Linda RN on [address]  being transferred to [address] [place] [address] for routine progression of care       Report consisted of patients Situation, Background, Assessment and   Recommendations(SBAR). Information from the following report(s) SBAR, Procedure Summary, Intake/Output, Cardiac Rhythm SR and Quality Measures was reviewed with the receiving nurse. Lines:   Peripheral IV 02/17/21 Right Antecubital (Active)   Site Assessment Clean, dry, & intact 02/17/21 0330   Phlebitis Assessment 0 02/17/21 0330   Infiltration Assessment 0 02/17/21 0330   Dressing Status New 02/17/21 0330   Dressing Type Tape;Transparent 02/17/21 0330   Hub Color/Line Status Blue; Infusing;Flushed 02/17/21 0330       Peripheral IV 02/17/21 Posterior;Right Wrist (Active)   Site Assessment Clean, dry, & intact 02/17/21 0953   Phlebitis Assessment 0 02/17/21 0953   Infiltration Assessment 0 02/17/21 0953   Dressing Status Clean, dry, & intact 02/17/21 0953   Dressing Type Transparent 02/17/21 0953   Hub Color/Line Status Blue 02/17/21 8239        Opportunity for questions and clarification was provided.       Patient transported with:   Tech, IV, IV pump, Chart, RN

## 2021-02-17 NOTE — CONSULTS
118 [facility].  [facility], [address]  [phone]                     GI CONSULTATION NOTE      NAME:  Jovan Davis   :   9/3/1931   MRN:   [id number]     Consult Date: 2021     Chief Complaint: Pancreatic mass, jaundice    History of Present Illness:  Patient is a 80 y.o. who is seen in consultation at the request of Dr. Kim Montes for the above problems. Patient was admitted yesterday with c/o persistent mid-abdominal / epigastric pain x 4 months that has recently been aggravated by eating. She reports a decreased appetite, nausea and vomiting, and weight loss of approx 50 lbs since 20 per review of the chart. Her granddaughter is present at bedside and reports that she notices patient with scleral icterus over the past several days, prompting her to bring her to the ER. Patient denies hematemesis, pruritus, change in BMs, hematochezia. ER evaluation revealed elevated LFTs: , , , TBili 7.1 (this had been 0.4 on 21). CT abd/pelvis showed a 4.7 x 3.8 cm mass in the pancreatic head extending to the angela hepatis region, with slight narrowing of the main portal vein and an obstructed CBD. PMH:  Past Medical History:   Diagnosis Date    [facility]     Hypertension     Myocardial infarction ([facility]     Seizure ([facility]     Stroke (cerebrum) ([facility]        PSH:  No past surgical history on file. Allergies:  No Known Allergies    Home Medications:  Prior to Admission Medications   Prescriptions Last Dose Informant Patient Reported? Taking?   acetaminophen (TYLENOL) 325 mg tablet Unknown at Unknown time  No No   Sig: Take 2 Tabs by mouth every four (4) hours as needed for Pain or Fever. aspirin 81 mg chewable tablet 2021 at 0900  Yes Yes   Sig: Take 81 mg by mouth daily. hydrALAZINE (APRESOLINE) 50 mg tablet 2021 at 0900  No Yes   Sig: Take 1 Tab by mouth three (3) times daily.    levETIRAcetam (KEPPRA) 500 mg tablet Unknown at Unknown time  No No   Sig: Take 1 Tab by mouth two (2) times a day. losartan (COZAAR) 100 mg tablet Unknown at Unknown time  No No   Sig: Take 1 Tab by mouth daily. meclizine (ANTIVERT) 25 mg tablet 2/16/2021 at 0900  Yes Yes   Sig: Take 25 mg by mouth three (3) times daily as needed for Dizziness. metFORMIN (GLUCOPHAGE) 500 mg tablet 2/16/2021 at 0900  Yes Yes   Sig: Take 500 mg by mouth two (2) times daily (with meals). metoprolol tartrate (LOPRESSOR) 100 mg IR tablet 2/16/2021 at 0900  No Yes   Sig: Take 1 Tab by mouth two (2) times a day. omeprazole (PRILOSEC) 40 mg capsule 2/16/2021 at 0900  Yes Yes   Sig: Take 40 mg by mouth daily. pantoprazole (PROTONIX) 40 mg tablet Unknown at Unknown time  No No   Sig: Take 1 Tab by mouth Daily (before breakfast). promethazine (PHENERGAN) 25 mg tablet 2/16/2021 at 0900  Yes Yes   Sig: Take 25 mg by mouth two (2) times daily as needed for Nausea. traZODone (DESYREL) 50 mg tablet Unknown at Unknown time  No No   Sig: Take 1 Tab by mouth nightly.       Facility-Administered Medications: None       Hospital Medications:  Current Facility-Administered Medications   Medication Dose Route Frequency    potassium chloride 10 mEq in 100 ml IVPB  10 mEq IntraVENous Q1H    potassium chloride SR (KLOR-CON 10) tablet 40 mEq  40 mEq Oral NOW    [Held by provider] aspirin chewable tablet 81 mg  81 mg Oral DAILY    hydrALAZINE (APRESOLINE) tablet 50 mg  50 mg Oral TID    levETIRAcetam (KEPPRA) tablet 500 mg  500 mg Oral BID    losartan (COZAAR) tablet 100 mg  100 mg Oral DAILY    meclizine (ANTIVERT) tablet 25 mg  25 mg Oral TID PRN    metoprolol tartrate (LOPRESSOR) tablet 100 mg  100 mg Oral BID    sodium chloride (NS) flush 5-40 mL  5-40 mL IntraVENous Q8H    sodium chloride (NS) flush 5-40 mL  5-40 mL IntraVENous PRN    0.9% sodium chloride infusion  75 mL/hr IntraVENous CONTINUOUS    ondansetron (ZOFRAN) injection 4 mg  4 mg IntraVENous Q4H PRN    pantoprazole (PROTONIX) 40 mg in 0.9% sodium chloride 10 mL injection  40 mg IntraVENous BID    HYDROmorphone (PF) (DILAUDID) injection 1 mg  1 mg IntraVENous Q3H PRN    hydrALAZINE (APRESOLINE) 20 mg/mL injection 10 mg  10 mg IntraVENous Q6H PRN       Social History:  Social History     Tobacco Use    Smoking status: Never Smoker    Smokeless tobacco: Never Used   Substance Use Topics    Alcohol use: Never     Frequency: Never       Family History:  History reviewed. No pertinent family history. Review of Systems:    Constitutional: negative fever, negative chills, positive weight loss  Eyes:   negative visual changes  ENT:   negative sore throat, tongue or lip swelling  Respiratory:  negative cough, negative dyspnea  Cards:  negative for chest pain, palpitations, lower extremity edema  GI:   See HPI  :  negative for frequency, dysuria  Integument:  negative for rash and pruritus  Heme:  negative for easy bruising and gum/nose bleeding  Musculoskel: negative for myalgias,  back pain and muscle weakness  Neuro: negative for headaches, dizziness, vertigo  Psych:  negative for feelings of anxiety, depression      Objective:     Patient Vitals for the past 8 hrs:   BP Temp Pulse Resp SpO2   02/17/21 0844 (!) 188/78 98.2 °F (36.8 °C) 86 16 97 %     No intake/output data recorded. 02/15 1901 - 02/17 0700  In: -   Out: 370 [Urine:370]      PHYSICAL EXAM:  General appearance: cooperative, no distress, appears stated age  Skin: Extremities and face reveal no rashes, pallor or jaundice  HEENT: Scleral icterus. Extra-occular muscles are intact. Cardiovascular: Regular rate and rhythm. No murmurs, gallops, or rubs. Respiratory: Comfortable breathing with no accessory muscle use. Clear breath sounds with no wheezes, rales, or rhonchi. GI: Abdomen nondistended, soft, and active bowel sounds. Mild-moderate epigastric ttp. No enlargement of the liver or spleen. No masses palpable.    Rectal: Deferred   Musculoskeletal: No edema of the lower legs. Neurological: Gross memory appears intact. Patient is alert and oriented. Psychiatric: Mood appears appropriate with good judgement. No anxiety or agitation. Data Review     Recent Labs     02/17/21  0349 02/16/21  1248   WBC 11.3* 12.0*   HGB 7.2* 8.5*   HCT 23.5* 27.4*    408*     Recent Labs     02/17/21  0349 02/16/21  1248    140   K 2.5* 3.3*    107   CO2 25 26   BUN 10 12   CREA 0.78 0.97   GLU 98 116*   CA 8.7 9.2     Recent Labs     02/17/21  0349 02/16/21  1248   * 368*   TP 6.8 7.6   ALB 2.2* 2.5*   GLOB 4.6* 5.1*   LPSE  --  480*     No results for input(s): INR, PTP, APTT, INREXT in the last 72 hours. Imaging studies reviewed    Assessment / Plan :     89-yo with 4 months of persistent epigastric pain, intermittent N/V and a weight loss of 50 lbs over the past 3 months with new onset scleral icterus, prompting an ER visit yesterday. Pancreatic mass, jaundice/scleral icterus, elevated LFTs, weight loss  - CT shows a 4.7 x 3.8 cm mass in the pancreatic head causing obstruction of the CBD  - Monitor LFTs -- TBili today 7.1  - Plan for EUS/ERCP today with Dr. Amanda De Jesus, tentatively at 1400 -- need rapid COVID prior, talked to nurse     Hypokalemia  - repletion per hospitalist - getting PO and IV K         Patient Active Hospital Problem List:   Active Problems:    Pancreatic mass (2/16/2021)    I have personally reviewed the history and independently examined the patient. I have reviewed the chart and agree with the documentation recorded by the Mid Level Provider, including the assessment, treatment plan, and disposition.     Cheryl Hurtado MD

## 2021-02-17 NOTE — CONSULTS
[facility] [place] [address] [address], [address]  W: [phone]  F: [phone]    Reason for consult    Funmilayo Pyle is a 80 y.o. female who is seen in consultation at the request of  for evaluation of Pancreatic mass    Treatment History:   · none    History of Present Illness:   Patient is a 80 y.o. female with PMH as below is seen for pancreatic and lung mass. She is a poor historian and we also reached out to her daughter who was not aware of past Breast cancer treatment. She was admitted on 2/16/2021 with c/o increasing mid epigastric pain with nausea and emesis, weight loss and decreased appetite over several months. She also noted yellow discoloration of skin and eyes . Upon admission was noted to have a Bilirubin of 7.1, , , . CT abdomen showed a 4.7 x 3.8 cm mass in the pancreatic head, RP and hepatogastric nodes as well as a 2x 1.5 cm Left lung lesion. She is to have an ERCP/ EUS and stent with Dr. Amanda Castano today. She has no fevers, chills, bleeding, breast lumps, HA, cough      Past Medical History:   Diagnosis Date    [facility] ([facility] [address]     Hypertension     Myocardial infarction ([facility] [facility]     Seizure ([facility] [facility]     Stroke (cerebrum) ([facility] [address] 2019      History reviewed. No pertinent surgical history. Social History     Tobacco Use    Smoking status: Never Smoker    Smokeless tobacco: Never Used   Substance Use Topics    Alcohol use: Never     Frequency: Never      History reviewed. No pertinent family history.   Current Facility-Administered Medications   Medication Dose Route Frequency    potassium chloride SR (KLOR-CON 10) tablet 40 mEq  40 mEq Oral NOW    sodium chloride (NS) flush 5-40 mL  5-40 mL IntraVENous Q8H    sodium chloride (NS) flush 5-40 mL  5-40 mL IntraVENous PRN    naloxone (NARCAN) injection 0.4 mg  0.4 mg IntraVENous Multiple    flumazeniL (ROMAZICON) 0.1 mg/mL injection 0.2 mg  0.2 mg IntraVENous Multiple    simethicone (MYLICON) 47BC/1.9ND oral drops 80 mg  1.2 mL Oral Multiple    atropine injection 0.5 mg  0.5 mg IntraVENous ONCE PRN    EPINEPHrine (ADRENALIN) 0.1 mg/mL syringe 1 mg  1 mg Endoscopically ONCE PRN    glucagon (GLUCAGEN) injection 1 mg  1 mg IntraVENous ONCE PRN    sevoflurane (ULTANE) for inhalation        [Held by provider] aspirin chewable tablet 81 mg  81 mg Oral DAILY    hydrALAZINE (APRESOLINE) tablet 50 mg  50 mg Oral TID    levETIRAcetam (KEPPRA) tablet 500 mg  500 mg Oral BID    losartan (COZAAR) tablet 100 mg  100 mg Oral DAILY    meclizine (ANTIVERT) tablet 25 mg  25 mg Oral TID PRN    metoprolol tartrate (LOPRESSOR) tablet 100 mg  100 mg Oral BID    sodium chloride (NS) flush 5-40 mL  5-40 mL IntraVENous Q8H    sodium chloride (NS) flush 5-40 mL  5-40 mL IntraVENous PRN    0.9% sodium chloride infusion  75 mL/hr IntraVENous CONTINUOUS    ondansetron (ZOFRAN) injection 4 mg  4 mg IntraVENous Q4H PRN    pantoprazole (PROTONIX) 40 mg in 0.9% sodium chloride 10 mL injection  40 mg IntraVENous BID    HYDROmorphone (PF) (DILAUDID) injection 1 mg  1 mg IntraVENous Q3H PRN    hydrALAZINE (APRESOLINE) 20 mg/mL injection 10 mg  10 mg IntraVENous Q6H PRN      No Known Allergies     Review of Systems: A complete review of systems was obtained, negative except as described above. Physical Exam:     Visit Vitals  BP (!) 178/71   Pulse 97   Temp 98 °F (36.7 °C)   Resp 20   Ht 5' 5\" (1.651 m)   Wt 200 lb (90.7 kg)   SpO2 98%   BMI 33.28 kg/m²     ECOG PS: 2  General: No distress  Eyes: PERRLA, icteric  HENT: Atraumatic  Neck: Supple  Lymphatic: No cervical, supraclavicular, or inguinal adenopathy  Respiratory:  normal respiratory effort  CV: Normal rate,no peripheral edema  GI: Soft, epigastric tenderness   MS: . Digits without clubbing or cyanosis. Skin: No rashes, ecchymoses, or petechiae. Normal temperature, turgor, and texture.   Psych: Alert, oriented    Results:     Lab Results   Component Value Date/Time    WBC 11.3 (H) 02/17/2021 03:49 AM    HGB 7.2 (L) 02/17/2021 03:49 AM    HCT 23.5 (L) 02/17/2021 03:49 AM    PLATELET 338 31/43/5134 03:49 AM    MCV 78.1 (L) 02/17/2021 03:49 AM    ABS. NEUTROPHILS 9.1 (H) 02/17/2021 03:49 AM     Lab Results   Component Value Date/Time    Sodium 140 02/17/2021 03:49 AM    Potassium 2.7 (LL) 02/17/2021 02:30 PM    Chloride 107 02/17/2021 03:49 AM    CO2 25 02/17/2021 03:49 AM    Glucose 98 02/17/2021 03:49 AM    BUN 10 02/17/2021 03:49 AM    Creatinine 0.78 02/17/2021 03:49 AM    GFR est AA >60 02/17/2021 03:49 AM    GFR est non-AA >60 02/17/2021 03:49 AM    Calcium 8.7 02/17/2021 03:49 AM    Glucose (POC) 164 (H) 12/13/2020 07:29 PM     Lab Results   Component Value Date/Time    Bilirubin, total 7.1 (H) 02/17/2021 03:49 AM    ALT (SGPT) 153 (H) 02/17/2021 03:49 AM    Alk. phosphatase 348 (H) 02/17/2021 03:49 AM    Protein, total 6.8 02/17/2021 03:49 AM    Albumin 2.2 (L) 02/17/2021 03:49 AM    Globulin 4.6 (H) 02/17/2021 03:49 AM         Records reviewed and summarized above. Pathology report(s) reviewed above. Radiology report(s) reviewed above. CT abdomen 2/16/2021  IMPRESSION     1. There is a 2 x 1.5 cm nodule left lung base which could represent metastatic  disease versus primary neoplasm. 2. There is intrahepatic ductal dilatation. There is extrahepatic ductal  dilatation. There is a mass in the region of the pancreatic head extending  cephalad obstructing the common duct. There is also dilatation of the pancreatic  duct in the body and tail and there is a normal-sized pancreatic duct in the  pancreatic head. There is slight narrowing of the portal vein. There is  hepatogastric ligament adenopathy.  There is retroperitoneal adenopathy  Differential includes metastatic adenopathy obstructing the common duct and  involving the pancreatic head versus neoplasm of the pancreatic head extending  superiorly with retroperitoneal and hepatogastric ligament adenopathy.     The gallbladder is distended and there are gallstones. Assessment:   1) Pancreatic head mass    CT abdomen shows a pancreatic head mass with surrounding adenopathy suspicious fro pancreatic primary  ERCP/ EUS and biopsy planned  Does appear atleast locally advanced though metastatic disease is not r/o with a L lung nodule  With her comorbidities and age if confirmed as pancreatic cancer will discuss palliative RT or celiac plexus block and supportive care dayanara    2) Left lung nodule  CT chest    3) H/O Breast cancer    4) h/o MI    5) H/O Stroke        Plan:     · CA 19-9, CT chest, await EUS biopsy results  · Consult palliative care    I appreciate the opportunity to participate in Ms. [address] PUSHPA Wilcox's care.     Signed By: Destini Miranda MD

## 2021-02-17 NOTE — ED NOTES
TRANSFER - OUT REPORT:    Verbal report given to [facility], RN(name) on David Trejo  being transferred to [facility](unit) for routine progression of care       Report consisted of patients Situation, Background, Assessment and   Recommendations(SBAR). Information from the following report(s) SBAR, Kardex, ED Summary, Procedure Summary, MAR and Recent Results was reviewed with the receiving nurse. Lines:   Peripheral IV 02/16/21 Right Antecubital (Active)        Opportunity for questions and clarification was provided.       Patient transported with:  transport

## 2021-02-17 NOTE — ANESTHESIA PREPROCEDURE EVALUATION
Relevant Problems   NEUROLOGY   (+) Seizure (HCC)      CARDIOVASCULAR   (+) Hypertensive urgency       Anesthetic History   No history of anesthetic complications            Review of Systems / Medical History  Patient summary reviewed, nursing notes reviewed and pertinent labs reviewed    Pulmonary  Within defined limits                 Neuro/Psych     seizures  CVA       Cardiovascular    Hypertension          Past MI and CAD    Exercise tolerance: <4 METS     GI/Hepatic/Renal  Within defined limits              Endo/Other        Anemia     Other Findings   Comments: Pancreatic mass:      Hypokalemia:   Seizure disorder:     Anemia:      Accelerated hypertension           Physical Exam    Airway  Mallampati: III  TM Distance: > 6 cm  Neck ROM: normal range of motion   Mouth opening: Normal     Cardiovascular  Regular rate and rhythm,  S1 and S2 normal,  no murmur, click, rub, or gallop             Dental  No notable dental hx       Pulmonary  Breath sounds clear to auscultation               Abdominal  GI exam deferred       Other Findings            Anesthetic Plan    ASA: 3  Anesthesia type: general          Induction: Intravenous  Anesthetic plan and risks discussed with: Patient

## 2021-02-17 NOTE — PROCEDURES
[address]  [address]  [address] [place], [address]  [phone]                   ERCP NOTE    NAME:  Elías Lund   :   9/3/1931   MRN:   [id number]     Date/Time:  2021 5:26 PM    Procedure Type:   ERCPwith biliary sphincterotomy, biliary stent placement     Indications: jaundice, abnormal CT/MRCP, pancreatic mass  Pre-operative Diagnosis: see indication above  Post-operative Diagnosis:  See findings below  : Meche Juares MD    Staff: Endoscopy Technician-1: Ab Barron  Endoscopy RN-1: Mannie Hashimoto, RN     Implants: 1 plastic biliary stent 10 Fr X 9 cm was placed in the common bile duct ([employer]van[employer], [employer])    Referring Provider:     Iveth Shirley MD    Sedation:  General anesthesia    Procedure Details:  After informed consent was obtained with all risks and benefits of procedure explained, the patient was taken to the fluoroscopy suite and placed in the prone position. Upon sequential sedation as per above, the Olympus duodenoscope [id number]   was inserted via the mouthpeice and carefully advanced to the second portion of the duodenum. The quality of visualization was good. The duodenoscope was withdrawn into a short position. Findings:   Esophagus:indirect visualization  Stomach: indirect visualization  Duodenum/jejunum: indirect visualization  Ampulla:-normal   Cholangiogram: -Bile duct was selectively cannulated using dream wire. Contrast was injected. A tight stricture was noted in the distal common bile duct in the intrapancreatic portion. The stricture was about 3 cm in length. There was no mucosal irregularity or filling defect noted. No luminal mass was noted. Bile duct proximal to the stricture was diffusely and tortuously dilated. Biliary sphincterotomy was performed using E RB E.  1 plastic biliary stent was placed in the common bile duct (10 Fr X 9 cm, [employer]).   Stent was in good position and copious amount of dark bile was flowing. Pancreatogram:not performed    Specimen Removed: * No specimens in log *    Complications: None. EBL:  None. Interventions:    Pancreatic: none  Biliary: Bile duct was selectively cannulated using dream wire. Contrast was injected. A tight stricture was noted in the distal common bile duct in the intrapancreatic portion. The stricture was about 3 cm in length. There was no mucosal irregularity or filling defect noted. No luminal mass was noted. Bile duct proximal to the stricture was diffusely and tortuously dilated. Biliary sphincterotomy was performed using E RB E.  1 plastic biliary stent was placed in the common bile duct (10 Fr X 9 cm, [employer]). Stent was in good position and copious amount of dark bile was flowing. Impression:  -Bile duct stricture-stent placed    Recommendations:    Clear liquid diet and advance as tolerated. Resume normal medication(s).   NO aspirin for 7 days   ERCP with stent change in 3 months  Consult Surgery and Oncology      Discharge Disposition: Continue care in the hospital    Daquan Sheehan MD  2/17/2021  5:26 PM

## 2021-02-17 NOTE — ANESTHESIA POSTPROCEDURE EVALUATION
Procedure(s):  ENDOSCOPIC RETROGRADE CHOLANGIOPANCREATOGRAPHY (ERCP)  ENDOSCOPIC ULTRASOUND (EUS)  ESOPHAGOGASTRODUODENOSCOPY (EGD)  FINE NEEDLE BIOPSY  ENDOSCOPIC SPHINCTEROTOMY  BILIARY STENT PLACEMENT. general    Anesthesia Post Evaluation        Patient location during evaluation: PACU  Note status: Adequate. Level of consciousness: responsive to verbal stimuli and sleepy but conscious  Pain management: satisfactory to patient  Airway patency: patent  Anesthetic complications: no  Cardiovascular status: acceptable  Respiratory status: acceptable  Hydration status: acceptable  Comments: +Post-Anesthesia Evaluation and Assessment    Patient: Bhavna Pahram MRN: [id number]  SSN: [id number]   YOB: 1931  Age: 80 y.o. Sex: female          Cardiovascular Function/Vital Signs    BP (!) 152/61   Pulse 99   Temp 36.8 °C (98.2 °F)   Resp 19   Ht 5' 5\" (1.651 m)   Wt 90.7 kg (200 lb)   SpO2 98%   BMI 33.28 kg/m²     Patient is status post Procedure(s):  ENDOSCOPIC RETROGRADE CHOLANGIOPANCREATOGRAPHY (ERCP)  ENDOSCOPIC ULTRASOUND (EUS)  ESOPHAGOGASTRODUODENOSCOPY (EGD)  FINE NEEDLE BIOPSY  ENDOSCOPIC SPHINCTEROTOMY  BILIARY STENT PLACEMENT. Nausea/Vomiting: Controlled. Postoperative hydration reviewed and adequate. Pain:  Pain Scale 1: Numeric (0 - 10) (02/17/21 1750)  Pain Intensity 1: 3 (02/17/21 1750)   Managed. Neurological Status: At baseline. Mental Status and Level of Consciousness: Arousable. Pulmonary Status:   O2 Device: Room air (02/17/21 1735)   Adequate oxygenation and airway patent. Complications related to anesthesia: None    Post-anesthesia assessment completed. No concerns. I have evaluated the patient and the patient is stable and ready to be discharged from PACU .     Signed By: Halima Aguilera MD    2/17/2021        INITIAL Post-op Vital signs:   Vitals Value Taken Time   /63 02/17/21 1755   Temp 36.8 °C (98.2 °F) 02/17/21 1729   Pulse 95 02/17/21 1759   Resp 26 02/17/21 1759   SpO2 99 % 02/17/21 1759   Vitals shown include unvalidated device data.

## 2021-02-17 NOTE — PERIOP NOTES
TRANSFER - IN REPORT:    Verbal report received from [address] on [address]  being received from [phone] for ordered procedure      Report consisted of patients Situation, Background, Assessment and   Recommendations(SBAR). Information from the following report(s) SBAR, Procedure Summary, Cardiac Rhythm SR and Pre Procedure Checklist was reviewed with the receiving nurse. Opportunity for questions and clarification was provided. Assessment completed upon patients arrival to unit and care assumed.

## 2021-02-17 NOTE — PROGRESS NOTES
[facility]  Hospitalist Group                                                                                          Hospitalist Progress Note  Tal Almonte MD  Answering service: [phone] OR [phone] from in house phone        Date of Service:  2021  NAME:  Joie Wharton  :  9/3/1931  MRN:  [id number]      Admission Summary:   Joie Wharton is a 80 y.o. female who presents with abdominal pain, and jaundice. Interval history / Subjective:   Pt has ongoing abdominal pain, achy. She hasnt noticed if its worse with eating or drinking. Plan for EUS and potential ERCP today   Discussed findings of pancreatic mass and concern for pancreatic cancer      Assessment & Plan:     Abdominal pain 2/2  Pancreatic mass - concern for carcinoma  Obstructive jaundice  - GI following, plan for ERCP and EUS today   - Continue IVF for now  - PRN dilaudid for pain  - Daily CMP   - Oncology consulted     Biliary stones - unlikely to be cause of abdominal pain  - ERCP today   - GI following    HTN - hydralazine, losartan and metoprolol. Monitor   Hypokalemia - severe, given 40mg IV, and 40 PO repeat level this evening. H/o seizure disorder  - home meds  Anemia - unclear etiology, suspect chronic disease due to malignancy. Stool blood pending, monitor hgb, and transfuse if less than 7. Holding ASA for now. H/o breast cancer - reported in remission, was > 10 year ago per patient. Code status: FULL, need to have ACP discussion once we have more information. DVT prophylaxis:SCDs pending procedures.      Care Plan discussed with: Patient/Family  Anticipated Disposition: Home w/Family  Anticipated Discharge: 24 hours to 48 hours     Hospital Problems  Date Reviewed: 2021          Codes Class Noted POA    Pancreatic mass ICD-10-CM: K86.89  ICD-9-CM: 577.8  2021 Unknown                Review of Systems:   A comprehensive review of systems was negative except for that written in the HPI. Vital Signs:    Last 24hrs VS reviewed since prior progress note. Most recent are:  Visit Vitals  BP (!) 187/69   Pulse (!) 107   Temp 98 °F (36.7 °C)   Resp 16   Ht 5' 5\" (1.651 m)   Wt 90.7 kg (200 lb)   SpO2 95%   BMI 33.28 kg/m²         Intake/Output Summary (Last 24 hours) at 2/17/2021 1514  Last data filed at 2/16/2021 1915  Gross per 24 hour   Intake    Output 370 ml   Net -370 ml        Physical Examination:     I had a face to face encounter with this patient and independently examined them on 2/17/2021 as outlined below:          Constitutional:  No acute distress, cooperative, pleasant, appears younger than stated age. ENT:  Oral mucosa moist, oropharynx benign. + scleral icterus   Resp:  CTA bilaterally. No wheezing/rhonchi/rales. No accessory muscle use   CV:  Regular rhythm, normal rate, no murmurs, gallops, rubs    GI:  Soft, non distended, + tender to palpation. normoactive bowel sounds, no hepatosplenomegaly     Musculoskeletal:  No edema, warm, 2+ pulses throughout    Neurologic:  Moves all extremities. AAOx3, CN II-XII reviewed     Psych:  Good insight, Not anxious nor agitated. Data Review:    Review and/or order of clinical lab test  Review and/or order of tests in the radiology section of CPT  Review and/or order of tests in the medicine section of CPT      Labs:     Recent Labs     02/17/21  0349 02/16/21  1248   WBC 11.3* 12.0*   HGB 7.2* 8.5*   HCT 23.5* 27.4*    408*     Recent Labs     02/17/21  0349 02/16/21  1248    140   K 2.5* 3.3*    107   CO2 25 26   BUN 10 12   CREA 0.78 0.97   GLU 98 116*   CA 8.7 9.2     Recent Labs     02/17/21  0349 02/16/21  1248   * 179*   * 368*   TBILI 7.1* 7.3*   TP 6.8 7.6   ALB 2.2* 2.5*   GLOB 4.6* 5.1*   LPSE  --  480*     No results for input(s): INR, PTP, APTT, INREXT in the last 72 hours. No results for input(s): FE, TIBC, PSAT, FERR in the last 72 hours.    No results found for: FOL, RBCF No results for input(s): PH, PCO2, PO2 in the last 72 hours.   Recent Labs     02/16/21  1910 02/16/21  1248   TROIQ <0.05 <0.05     Lab Results   Component Value Date/Time    Cholesterol, total 190 12/03/2020 04:00 AM    HDL Cholesterol 59 12/03/2020 04:00 AM    LDL, calculated 107 (H) 12/03/2020 04:00 AM    Triglyceride 120 12/03/2020 04:00 AM    CHOL/HDL Ratio 3.2 12/03/2020 04:00 AM     Lab Results   Component Value Date/Time    Glucose (POC) 164 (H) 12/13/2020 07:29 PM    Glucose (POC) 190 (H) 12/13/2020 04:57 PM    Glucose (POC) 137 (H) 12/12/2020 09:19 PM    Glucose (POC) 204 (H) 12/12/2020 11:54 AM    Glucose (POC) 138 (H) 12/12/2020 07:54 AM     Lab Results   Component Value Date/Time    Color Yellow/Straw 01/29/2021 10:00 PM    Appearance Clear 01/29/2021 10:00 PM    Specific gravity 1.024 01/29/2021 10:00 PM    pH (UA) 5.0 01/29/2021 10:00 PM    Protein 30 (A) 01/29/2021 10:00 PM    Glucose 150 (A) 01/29/2021 10:00 PM    Ketone Negative 01/29/2021 10:00 PM    Bilirubin Negative 01/29/2021 10:00 PM    Urobilinogen 2.0 (H) 01/29/2021 10:00 PM    Nitrites Negative 01/29/2021 10:00 PM    Leukocyte Esterase Negative 01/29/2021 10:00 PM    Bacteria Negative 01/29/2021 10:00 PM    WBC 0-4 01/29/2021 10:00 PM    RBC 0-5 01/29/2021 10:00 PM         Medications Reviewed:     Current Facility-Administered Medications   Medication Dose Route Frequency    sevoflurane (ULTANE) for inhalation        potassium chloride SR (KLOR-CON 10) tablet 40 mEq  40 mEq Oral NOW    [Held by provider] aspirin chewable tablet 81 mg  81 mg Oral DAILY    hydrALAZINE (APRESOLINE) tablet 50 mg  50 mg Oral TID    levETIRAcetam (KEPPRA) tablet 500 mg  500 mg Oral BID    losartan (COZAAR) tablet 100 mg  100 mg Oral DAILY    meclizine (ANTIVERT) tablet 25 mg  25 mg Oral TID PRN    metoprolol tartrate (LOPRESSOR) tablet 100 mg  100 mg Oral BID    sodium chloride (NS) flush 5-40 mL  5-40 mL IntraVENous Q8H    sodium chloride (NS) flush 5-40 mL  5-40 mL IntraVENous PRN    0.9% sodium chloride infusion  75 mL/hr IntraVENous CONTINUOUS    ondansetron (ZOFRAN) injection 4 mg  4 mg IntraVENous Q4H PRN    pantoprazole (PROTONIX) 40 mg in 0.9% sodium chloride 10 mL injection  40 mg IntraVENous BID    HYDROmorphone (PF) (DILAUDID) injection 1 mg  1 mg IntraVENous Q3H PRN    hydrALAZINE (APRESOLINE) 20 mg/mL injection 10 mg  10 mg IntraVENous Q6H PRN     ______________________________________________________________________  EXPECTED LENGTH OF STAY: 3d 2h  ACTUAL LENGTH OF STAY:          1                 Aston Painting MD

## 2021-02-17 NOTE — PROGRESS NOTES
Spiritual Care Assessment/Progress Note  [facility]      NAME: Luna Toth      MRN: [id number]  AGE: 80 y.o.  SEX: female  Episcopalian Affiliation: Anglican   Language: English     2/16/2021     Total Time (in minutes): 32     Spiritual Assessment begun in [facility] [phone] through conversation with:         [x]Patient        [] Family    [] Friend(s)        Reason for Consult: Request by staff     Spiritual beliefs: (Please include comment if needed)     [x] Identifies with a lin tradition:         [] Supported by a lin community:            [] Claims no spiritual orientation:           [] Seeking spiritual identity:                [] Adheres to an individual form of spirituality:           [] Not able to assess:                           Identified resources for coping:      [x] Prayer                               [] Music                  [] Guided Imagery     [x] Family/friends                 [] Pet visits     [] Devotional reading                         [] Unknown     [] Other:                                              Interventions offered during this visit: (See comments for more details)    Patient Interventions: Affirmation of emotions/emotional suffering, Affirmation of lin, Catharsis/review of pertinent events in supportive environment, Initial/Spiritual assessment, patient floor, Initial visit, Normalization of emotional/spiritual concerns           Plan of Care:     [x] Support spiritual and/or cultural needs    [] Support AMD and/or advance care planning process      [] Support grieving process   [] Coordinate Rites and/or Rituals    [] Coordination with community clergy   [] No spiritual needs identified at this time   [] Detailed Plan of Care below (See Comments)  [] Make referral to Music Therapy  [] Make referral to Pet Therapy     [] Make referral to Addiction services  [] Make referral to [facility]  [] Make referral to Spiritual Care Partner  [] No future visits requested        [x] Follow up upon further referrals     Comments:  made visit to patients per staff request RN Bc French. Patient was lying in bed when  arrived in the room.  was told that the patient got bad news today and wanted to now talk about it. Patient was complaining about being in pain NAD said all she wanted to do is go home, because they can not do anything for her here. She said she wanted no surgery, but medical treatment that does not require surgery is okay. She does not want her family to make any decisions for her and she is capable of making her own decisions  offered words of comfort and encouragemen to the patient.  went to nursing station, but patients nurse was unavailable.  left a message with another RN that the patient was complaining about pain. Iwona Nails [facility] EMERGENCY [facility] CENTER Staff   [facility] Staff   [address] [place] [address] [address]: [phone]/ [address] [facility]  [email or web address]

## 2021-02-17 NOTE — PERIOP NOTES

## 2021-02-17 NOTE — PROCEDURES
[facility]  [facility] [address] [place] [address], [address]   [phone]                                Endoscopic Ultrasound    NAME:  Kamilah Barker   :   9/3/1931   MRN:   [id number]       Date/Time:  2021   Procedure Type: Linear Upper EUS with FNB      Indications: Pancreatic mass    Pre-operative Diagnosis: see indication above    Post-operative Diagnosis:  See findings below    : Lorenzo Castellano MD    Surgical Assistant: Endoscopy Technician-1: Patrick Marinelli  Endoscopy RN-1: Caryl Keita RN    Implants: none    Referring Provider: Jg Adler MD    Anethesia/Sedation:  General anesthesia      Procedure Details   After infom consent was obtained for the procedure, with all risks and benefits of procedure explained the patient was taken to the endoscopy suite and placed in the left lateral decubitus position. Following sequential administration of sedation as per above, the linear echoendoscope was inserted into the mouth and advanced under direct vision to second portion of the duodenum. A careful inspection was made as the gastroscope was withdrawn, including a retroflexed view of the proximal stomach; findings and interventions are described below. Findings:     Endoscopic:   Esophagus:normal   Stomach: Extrinsic compression on gastric antrum was noted. Mucosa was normal   Duodenum/jejunum: Normal mucosa. Extrinsic compression on the distal bulb and second portion of the duodenum    Ultrasound:   Esophagus: not examined   Stomach: not examined   Pancreas:     Areas examined: the entire gland    Parenchyma: -A heterogeneous, poorly defined mass was noted in the pancreatic head on the superior aspect and was extending towards angela hepatis. Mass was seen infiltrating into the portal vein. Mass measured about 43 mm x 35 mm in size. Bile duct was obstructed in the intrapancreatic portion due to the mass.   Pancreatic duct was dilated tortuous Renny Naval in the entire pancreas and ended abruptly in this mass. No infiltration into duodenum was noted. Pancreatic body and tail were otherwise unremarkable except for tortuous Nba dilated pancreatic duct and side branches. Liver:     Parenchyma: not examined    Gallbladder: stone     Bile Duct: the common bile duct was tortuous [place] and diffusely dilated with the largest diameter of about 13 mm. The bile duct was obstructed in the head region where the mass was located and could not be visualized. Lymph Node: Several lymph nodes were noted in the peripancreatic region as well as in the celiac axis. The largest one measured about 15 mm x 13 mm in size. Specimen Removed:  Biopsy of the head of the pancreas and biopsy of celiac axis lymph node    Complications: None. EBL:  None.     Interventions: Fine needle aspirate for biopsy of pancreatic head mass and celiac axis lymph node using 2 different shark core 22 gauge needles with 4 of passes with preliminary results suggesting indetermined      Recommendations:   -Await cytology  -ERCP today    Yan Frost MD  2/17/2021  5:20 PM

## 2021-02-17 NOTE — PROGRESS NOTES
TRANSFER - OUT REPORT:    Verbal report given to Prashant Villalobos RN(name) on Luh Oas  being transferred to ENDO (unit) for ordered procedure       Report consisted of patients Situation, Background, Assessment and   Recommendations(SBAR). Information from the following report(s) SBAR and Kardex was reviewed with the receiving nurse. Lines:   Peripheral IV 02/17/21 Right Antecubital (Active)   Site Assessment Clean, dry, & intact 02/17/21 0330   Phlebitis Assessment 0 02/17/21 0330   Infiltration Assessment 0 02/17/21 0330   Dressing Status New 02/17/21 0330   Dressing Type Tape;Transparent 02/17/21 0330   Hub Color/Line Status Blue; Infusing;Flushed 02/17/21 0330       Peripheral IV 02/17/21 Posterior;Right Wrist (Active)   Site Assessment Clean, dry, & intact 02/17/21 0953   Phlebitis Assessment 0 02/17/21 0953   Infiltration Assessment 0 02/17/21 0953   Dressing Status Clean, dry, & intact 02/17/21 0953   Dressing Type Transparent 02/17/21 0953   Hub Color/Line Status Blue 02/17/21 8798        Opportunity for questions and clarification was provided.       Patient transported with:

## 2021-02-18 LAB
ALBUMIN SERPL-MCNC: 2.1 G/DL (ref 3.5–5)
ALBUMIN/GLOB SERPL: 0.5 {RATIO} (ref 1.1–2.2)
ALP SERPL-CCNC: 335 U/L (ref 45–117)
ALT SERPL-CCNC: 125 U/L (ref 12–78)
ANION GAP SERPL CALC-SCNC: 9 MMOL/L (ref 5–15)
AST SERPL-CCNC: 135 U/L (ref 15–37)
BASOPHILS # BLD: 0 K/UL (ref 0–0.1)
BASOPHILS NFR BLD: 0 % (ref 0–1)
BILIRUB SERPL-MCNC: 3.7 MG/DL (ref 0.2–1)
BUN SERPL-MCNC: 13 MG/DL (ref 6–20)
BUN/CREAT SERPL: 14 (ref 12–20)
CALCIUM SERPL-MCNC: 8.9 MG/DL (ref 8.5–10.1)
CHLORIDE SERPL-SCNC: 111 MMOL/L (ref 97–108)
CO2 SERPL-SCNC: 22 MMOL/L (ref 21–32)
CREAT SERPL-MCNC: 0.94 MG/DL (ref 0.55–1.02)
DIFFERENTIAL METHOD BLD: ABNORMAL
EOSINOPHIL # BLD: 0 K/UL (ref 0–0.4)
EOSINOPHIL NFR BLD: 0 % (ref 0–7)
ERYTHROCYTE [DISTWIDTH] IN BLOOD BY AUTOMATED COUNT: 17.3 % (ref 11.5–14.5)
GLOBULIN SER CALC-MCNC: 4.5 G/DL (ref 2–4)
GLUCOSE SERPL-MCNC: 103 MG/DL (ref 65–100)
HCT VFR BLD AUTO: 24 % (ref 35–47)
HGB BLD-MCNC: 7.2 G/DL (ref 11.5–16)
IMM GRANULOCYTES # BLD AUTO: 0.1 K/UL (ref 0–0.04)
IMM GRANULOCYTES NFR BLD AUTO: 1 % (ref 0–0.5)
LYMPHOCYTES # BLD: 0.8 K/UL (ref 0.8–3.5)
LYMPHOCYTES NFR BLD: 6 % (ref 12–49)
MCH RBC QN AUTO: 23.8 PG (ref 26–34)
MCHC RBC AUTO-ENTMCNC: 30 G/DL (ref 30–36.5)
MCV RBC AUTO: 79.5 FL (ref 80–99)
MONOCYTES # BLD: 0.7 K/UL (ref 0–1)
MONOCYTES NFR BLD: 5 % (ref 5–13)
NEUTS SEG # BLD: 13 K/UL (ref 1.8–8)
NEUTS SEG NFR BLD: 88 % (ref 32–75)
NRBC # BLD: 0 K/UL (ref 0–0.01)
NRBC BLD-RTO: 0 PER 100 WBC
PLATELET # BLD AUTO: 337 K/UL (ref 150–400)
PMV BLD AUTO: 11.5 FL (ref 8.9–12.9)
POTASSIUM SERPL-SCNC: 3.6 MMOL/L (ref 3.5–5.1)
PROT SERPL-MCNC: 6.6 G/DL (ref 6.4–8.2)
RBC # BLD AUTO: 3.02 M/UL (ref 3.8–5.2)
SODIUM SERPL-SCNC: 142 MMOL/L (ref 136–145)
WBC # BLD AUTO: 14.6 K/UL (ref 3.6–11)

## 2021-02-18 PROCEDURE — 74011250636 HC RX REV CODE- 250/636: Performed by: HOSPITALIST

## 2021-02-18 PROCEDURE — 74011000250 HC RX REV CODE- 250: Performed by: FAMILY MEDICINE

## 2021-02-18 PROCEDURE — 80053 COMPREHEN METABOLIC PANEL: CPT

## 2021-02-18 PROCEDURE — 65270000032 HC RM SEMIPRIVATE

## 2021-02-18 PROCEDURE — 94762 N-INVAS EAR/PLS OXIMTRY CONT: CPT

## 2021-02-18 PROCEDURE — 74011250637 HC RX REV CODE- 250/637: Performed by: FAMILY MEDICINE

## 2021-02-18 PROCEDURE — 74011250636 HC RX REV CODE- 250/636: Performed by: FAMILY MEDICINE

## 2021-02-18 PROCEDURE — C9113 INJ PANTOPRAZOLE SODIUM, VIA: HCPCS | Performed by: FAMILY MEDICINE

## 2021-02-18 PROCEDURE — 77010033678 HC OXYGEN DAILY

## 2021-02-18 PROCEDURE — 74011250637 HC RX REV CODE- 250/637: Performed by: HOSPITALIST

## 2021-02-18 PROCEDURE — 85025 COMPLETE CBC W/AUTO DIFF WBC: CPT

## 2021-02-18 PROCEDURE — 94760 N-INVAS EAR/PLS OXIMETRY 1: CPT

## 2021-02-18 PROCEDURE — 36415 COLL VENOUS BLD VENIPUNCTURE: CPT

## 2021-02-18 RX ORDER — OXYCODONE HYDROCHLORIDE 5 MG/1
5 TABLET ORAL
Status: DISCONTINUED | OUTPATIENT
Start: 2021-02-18 | End: 2021-02-19

## 2021-02-18 RX ORDER — MORPHINE SULFATE 2 MG/ML
1 INJECTION, SOLUTION INTRAMUSCULAR; INTRAVENOUS
Status: DISCONTINUED | OUTPATIENT
Start: 2021-02-18 | End: 2021-02-23 | Stop reason: HOSPADM

## 2021-02-18 RX ADMIN — Medication 10 ML: at 14:00

## 2021-02-18 RX ADMIN — PANTOPRAZOLE SODIUM 40 MG: 40 INJECTION, POWDER, FOR SOLUTION INTRAVENOUS at 10:31

## 2021-02-18 RX ADMIN — HYDRALAZINE HYDROCHLORIDE 50 MG: 50 TABLET, FILM COATED ORAL at 15:55

## 2021-02-18 RX ADMIN — MORPHINE SULFATE 1 MG: 2 INJECTION, SOLUTION INTRAMUSCULAR; INTRAVENOUS at 15:55

## 2021-02-18 RX ADMIN — HYDRALAZINE HYDROCHLORIDE 50 MG: 50 TABLET, FILM COATED ORAL at 22:11

## 2021-02-18 RX ADMIN — METOPROLOL TARTRATE 100 MG: 50 TABLET, FILM COATED ORAL at 10:31

## 2021-02-18 RX ADMIN — OXYCODONE 5 MG: 5 TABLET ORAL at 13:44

## 2021-02-18 RX ADMIN — METOPROLOL TARTRATE 100 MG: 50 TABLET, FILM COATED ORAL at 18:00

## 2021-02-18 RX ADMIN — LOSARTAN POTASSIUM 100 MG: 50 TABLET, FILM COATED ORAL at 10:31

## 2021-02-18 RX ADMIN — SODIUM CHLORIDE 75 ML/HR: 9 INJECTION, SOLUTION INTRAVENOUS at 13:13

## 2021-02-18 RX ADMIN — LEVETIRACETAM 500 MG: 500 TABLET ORAL at 18:00

## 2021-02-18 RX ADMIN — SODIUM CHLORIDE 75 ML/HR: 9 INJECTION, SOLUTION INTRAVENOUS at 00:11

## 2021-02-18 RX ADMIN — LEVETIRACETAM 500 MG: 500 TABLET ORAL at 10:31

## 2021-02-18 RX ADMIN — MECLIZINE 25 MG: 12.5 TABLET ORAL at 16:09

## 2021-02-18 RX ADMIN — HYDRALAZINE HYDROCHLORIDE 50 MG: 50 TABLET, FILM COATED ORAL at 10:31

## 2021-02-18 RX ADMIN — PANTOPRAZOLE SODIUM 40 MG: 40 INJECTION, POWDER, FOR SOLUTION INTRAVENOUS at 18:00

## 2021-02-18 NOTE — PROGRESS NOTES
[address]  [address], [address]   [phone]                     GI PROGRESS NOTE    Patient Name: Lois Hemphill      : 9/3/1931      MRN: [id number]  Admit Date: 2021  Today's Date: 2021    Subjective:     She had EUS / ERCP yesterday. She states that she has epigastric soreness, but not worse than what she had pre-procedure. She denies nausea and vomiting. Path pending. Oncology has seen her. Objective:     Blood pressure (!) 144/62, pulse 88, temperature 98.3 °F (36.8 °C), resp. rate 16, height 5' 5\" (1.651 m), weight 90.7 kg (200 lb), SpO2 96 %. Physical Exam:  General appearance: cooperative, no distress  Skin: Extremities and face reveal no jaundice or pallor  GI: Abdomen nondistended   Neurological: Patient is alert   Psychiatric:  No anxiety or agitation. Data Review:    Recent Results (from the past 24 hour(s))   TROPONIN I    Collection Time: 21  2:30 PM   Result Value Ref Range    Troponin-I, Qt. 0.07 (H) <0.05 ng/mL   POTASSIUM    Collection Time: 21  2:30 PM   Result Value Ref Range    Potassium 2.7 (LL) 3.5 - 5.1 mmol/L   METABOLIC PANEL, COMPREHENSIVE    Collection Time: 21  5:00 AM   Result Value Ref Range    Sodium 142 136 - 145 mmol/L    Potassium 3.6 3.5 - 5.1 mmol/L    Chloride 111 (H) 97 - 108 mmol/L    CO2 22 21 - 32 mmol/L    Anion gap 9 5 - 15 mmol/L    Glucose 103 (H) 65 - 100 mg/dL    BUN 13 6 - 20 MG/DL    Creatinine 0.94 0.55 - 1.02 MG/DL    BUN/Creatinine ratio 14 12 - 20      GFR est AA >60 >60 ml/min/1.73m2    GFR est non-AA 56 (L) >60 ml/min/1.73m2    Calcium 8.9 8.5 - 10.1 MG/DL    Bilirubin, total 3.7 (H) 0.2 - 1.0 MG/DL    ALT (SGPT) 125 (H) 12 - 78 U/L    AST (SGOT) 135 (H) 15 - 37 U/L    Alk.  phosphatase 335 (H) 45 - 117 U/L    Protein, total 6.6 6.4 - 8.2 g/dL    Albumin 2.1 (L) 3.5 - 5.0 g/dL    Globulin 4.5 (H) 2.0 - 4.0 g/dL    A-G Ratio 0.5 (L) 1.1 - 2.2     CBC WITH AUTOMATED DIFF Collection Time: 02/18/21  5:00 AM   Result Value Ref Range    WBC 14.6 (H) 3.6 - 11.0 K/uL    RBC 3.02 (L) 3.80 - 5.20 M/uL    HGB 7.2 (L) 11.5 - 16.0 g/dL    HCT 24.0 (L) 35.0 - 47.0 %    MCV 79.5 (L) 80.0 - 99.0 FL    MCH 23.8 (L) 26.0 - 34.0 PG    MCHC 30.0 30.0 - 36.5 g/dL    RDW 17.3 (H) 11.5 - 14.5 %    PLATELET 227 470 - 355 K/uL    MPV 11.5 8.9 - 12.9 FL    NRBC 0.0 0  WBC    ABSOLUTE NRBC 0.00 0.00 - 0.01 K/uL    NEUTROPHILS 88 (H) 32 - 75 %    LYMPHOCYTES 6 (L) 12 - 49 %    MONOCYTES 5 5 - 13 %    EOSINOPHILS 0 0 - 7 %    BASOPHILS 0 0 - 1 %    IMMATURE GRANULOCYTES 1 (H) 0.0 - 0.5 %    ABS. NEUTROPHILS 13.0 (H) 1.8 - 8.0 K/UL    ABS. LYMPHOCYTES 0.8 0.8 - 3.5 K/UL    ABS. MONOCYTES 0.7 0.0 - 1.0 K/UL    ABS. EOSINOPHILS 0.0 0.0 - 0.4 K/UL    ABS. BASOPHILS 0.0 0.0 - 0.1 K/UL    ABS. IMM. GRANS. 0.1 (H) 0.00 - 0.04 K/UL    DF AUTOMATED           Assessment / Plan :       89-yo with 4 months of persistent epigastric pain, intermittent N/V and a weight loss of 50 lbs over the past 3 months with new onset scleral icterus, prompting an ER visit yesterday.      Pancreatic mass, jaundice/scleral icterus, elevated LFTs, weight loss  - CT shows a 4.7 x 3.8 cm mass in the pancreatic head causing obstruction of the CBD  - Monitor LFTs -- TBili today 3.7, all trending down  - EUS 2/17 - A heterogeneous, poorly defined mass was noted in the pancreatic head on the superior aspect and was extending towards angela hepatis. Mass was seen infiltrating into the portal vein. Mass measured about 43 mm x 35 mm in size. Bile duct was obstructed in the intrapancreatic portion due to the mass. Pancreatic duct was dilated tortuous Nba in the entire pancreas and ended abruptly in this mass. No infiltration into duodenum was noted. Pancreatic body and tail were otherwise unremarkable except for tortuous Nba dilated pancreatic duct and side branches.   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 The stricture was about 3 cm in length. There was no mucosal irregularity or filling defect noted. No luminal mass was noted. Bile duct proximal to the stricture was diffusely and tortuously dilated. Biliary sphincterotomy was performed using E RB E.  1 plastic biliary stent was placed in the common bile duct (10 Fr X 9 cm, [employer]). -Deveron Dill Dr. Myers Height has seen     Hypokalemia  - repletion per hospitalist - getting PO and IV K          Patient Active Hospital Problem List:   Active Problems:    Pancreatic mass (2/16/2021)    I have personally reviewed the history and independently examined the patient. I have reviewed the chart and agree with the documentation recorded by the Mid Level Provider, including the assessment, treatment plan, and disposition.     Carles Habermann, MD

## 2021-02-18 NOTE — PROGRESS NOTES
[facility]  Hospitalist Group                                                                                          Hospitalist Progress Note  Brandon Hammond MD  Answering service: [phone] OR [phone] from in house phone        Date of Service:  2021  NAME:  Dg Stauffer  :  9/3/1931  MRN:  [id number]      Admission Summary:   Dg Stauffer is a 80 y.o. female who presents with abdominal pain, and jaundice. Interval history / Subjective:     Her grand daughter is at bedside today. Patient states that she has abdominal pain. Tolerating clear liquids so far. Assessment & Plan:     Abdominal pain due to Pancreatic mass - concern for carcinoma  Obstructive jaundice  - GI following, s/p ERCP and EUS  - she had a Bile duct stricture-stent placed  - Continue IVF for now  - stop Iv dilaudid - switch to oral oxycodone prn and IV morphine prn   -GI and onc following  -path pending  -OP Gi follow up for stent change, no aspirin for 7 days    Gall stones - unlikely to be cause of abdominal pain      HTN - hydralazine and metoprolol. Hold losartan as that she was not on her home meds. Hypokalemia - replete as needed  H/o seizure disorder  - on keppra  Anemia - likely due to chronic disease due to malignancy. S monitor hgb, and transfuse if less than 7. Holding ASA for now. H/o breast cancer - reported in remission, was > 10 year ago per patient. Code status: FULL, need to have ACP discussion once we have more information. DVT prophylaxis:SCDs pending procedures. There was some upper lip swelling-today is my first encounter. Per grand daughter may be present today ? Anesthesia or any meds-monitor.  No SOB or difficulty swallowing    Care Plan discussed with: Patient/Family  Anticipated Disposition: Home w/Family  Anticipated Discharge: 24 hours to 48 hours     Hospital Problems  Date Reviewed: 2021          Codes Class Noted POA    Pancreatic mass ICD-10-CM: K86.89  ICD-9-CM: 577.8  2/16/2021 Unknown                Review of Systems:   A comprehensive review of systems was negative except for that written in the HPI. Vital Signs:    Last 24hrs VS reviewed since prior progress note. Most recent are:  Visit Vitals  /71 (BP 1 Location: Right upper arm, BP Patient Position: At rest)   Pulse 82   Temp 98.6 °F (37 °C)   Resp 16   Ht 5' 5\" (1.651 m)   Wt 90.7 kg (200 lb)   SpO2 94%   BMI 33.28 kg/m²         Intake/Output Summary (Last 24 hours) at 2/18/2021 1736  Last data filed at 2/17/2021 1841  Gross per 24 hour   Intake 60 ml   Output    Net 60 ml        Physical Examination:     I had a face to face encounter with this patient and independently examined them on 2/18/2021 as outlined below:          Constitutional:  No acute distress, elderly patient    ENT:  Oral mucosa moist, oropharynx benign. + scleral icterus, upper lip swelling   Resp:  CTA bilaterally. No wheezing/rhonchi/rales. No accessory muscle use   CV:  Regular rhythm, normal rate, no murmurs, gallops, rubs    GI:  Soft, non distended, + tender to palpation. normoactive bowel sounds, no hepatosplenomegaly     Musculoskeletal:  No LE edema, warm, 2+ pulses throughout    Neurologic:  Moves all extremities. AAOx3,hard of hearing     Psych:  Not anxious nor agitated.        Data Review:    Review and/or order of clinical lab test  Review and/or order of tests in the radiology section of CPT  Review and/or order of tests in the medicine section of CPT      Labs:     Recent Labs     02/18/21  0500 02/17/21  0349   WBC 14.6* 11.3*   HGB 7.2* 7.2*   HCT 24.0* 23.5*    318     Recent Labs     02/18/21  0500 02/17/21  1430 02/17/21  0349 02/16/21  1248     --  140 140   K 3.6 2.7* 2.5* 3.3*   *  --  107 107   CO2 22  --  25 26   BUN 13  --  10 12   CREA 0.94  --  0.78 0.97   *  --  98 116*   CA 8.9  --  8.7 9.2     Recent Labs     02/18/21  0500 02/17/21  0349 02/16/21  1248   ALT 125* 153* 179*   * 348* 368*   TBILI 3.7* 7.1* 7.3*   TP 6.6 6.8 7.6   ALB 2.1* 2.2* 2.5*   GLOB 4.5* 4.6* 5.1*   LPSE  --   --  480*     No results for input(s): INR, PTP, APTT, INREXT, INREXT in the last 72 hours. No results for input(s): FE, TIBC, PSAT, FERR in the last 72 hours. No results found for: FOL, RBCF   No results for input(s): PH, PCO2, PO2 in the last 72 hours.   Recent Labs     02/17/21  1430 02/16/21  1910 02/16/21  1248   TROIQ 0.07* <0.05 <0.05     Lab Results   Component Value Date/Time    Cholesterol, total 190 12/03/2020 04:00 AM    HDL Cholesterol 59 12/03/2020 04:00 AM    LDL, calculated 107 (H) 12/03/2020 04:00 AM    Triglyceride 120 12/03/2020 04:00 AM    CHOL/HDL Ratio 3.2 12/03/2020 04:00 AM     Lab Results   Component Value Date/Time    Glucose (POC) 164 (H) 12/13/2020 07:29 PM    Glucose (POC) 190 (H) 12/13/2020 04:57 PM    Glucose (POC) 137 (H) 12/12/2020 09:19 PM    Glucose (POC) 204 (H) 12/12/2020 11:54 AM    Glucose (POC) 138 (H) 12/12/2020 07:54 AM     Lab Results   Component Value Date/Time    Color Yellow/Straw 01/29/2021 10:00 PM    Appearance Clear 01/29/2021 10:00 PM    Specific gravity 1.024 01/29/2021 10:00 PM    pH (UA) 5.0 01/29/2021 10:00 PM    Protein 30 (A) 01/29/2021 10:00 PM    Glucose 150 (A) 01/29/2021 10:00 PM    Ketone Negative 01/29/2021 10:00 PM    Bilirubin Negative 01/29/2021 10:00 PM    Urobilinogen 2.0 (H) 01/29/2021 10:00 PM    Nitrites Negative 01/29/2021 10:00 PM    Leukocyte Esterase Negative 01/29/2021 10:00 PM    Bacteria Negative 01/29/2021 10:00 PM    WBC 0-4 01/29/2021 10:00 PM    RBC 0-5 01/29/2021 10:00 PM         Medications Reviewed:     Current Facility-Administered Medications   Medication Dose Route Frequency    oxyCODONE IR (ROXICODONE) tablet 5 mg  5 mg Oral Q6H PRN    morphine injection 1 mg  1 mg IntraVENous Q6H PRN    sodium chloride (NS) flush 5-40 mL  5-40 mL IntraVENous Q8H    sodium chloride (NS) flush 5-40 mL  5-40 mL IntraVENous PRN    [Held by provider] aspirin chewable tablet 81 mg  81 mg Oral DAILY    hydrALAZINE (APRESOLINE) tablet 50 mg  50 mg Oral TID    levETIRAcetam (KEPPRA) tablet 500 mg  500 mg Oral BID    [Held by provider] losartan (COZAAR) tablet 100 mg  100 mg Oral DAILY    meclizine (ANTIVERT) tablet 25 mg  25 mg Oral TID PRN    metoprolol tartrate (LOPRESSOR) tablet 100 mg  100 mg Oral BID    sodium chloride (NS) flush 5-40 mL  5-40 mL IntraVENous Q8H    sodium chloride (NS) flush 5-40 mL  5-40 mL IntraVENous PRN    0.9% sodium chloride infusion  75 mL/hr IntraVENous CONTINUOUS    ondansetron (ZOFRAN) injection 4 mg  4 mg IntraVENous Q4H PRN    pantoprazole (PROTONIX) 40 mg in 0.9% sodium chloride 10 mL injection  40 mg IntraVENous BID    hydrALAZINE (APRESOLINE) 20 mg/mL injection 10 mg  10 mg IntraVENous Q6H PRN     ______________________________________________________________________  EXPECTED LENGTH OF STAY: 3d 2h  ACTUAL LENGTH OF STAY:          2                 Oscar Dunaway MD

## 2021-02-18 NOTE — PROGRESS NOTES
Bedside and Verbal shift change report given to Olvin Mcclellan  (oncoming nurse) by Jerald Goodman (offgoing nurse). Report included the following information SBAR and Kardex.

## 2021-02-18 NOTE — PROGRESS NOTES
Comprehensive Nutrition Assessment    Type and Reason for Visit: Reassess    Nutrition Recommendations/Plan:    1. Diet advancement to regular, low fat   - encourage PO with meals   2. Agree with continued discussions regarding goals of care  3. Obtain measured weight  - current wt is \"estimated\"    Nutrition Assessment:    Pt admitted for pancreatic mass. Past Medical History:   Diagnosis Date    [facility]     Hypertension     Myocardial infarction ([facility]     Seizure ([facility]     Stroke (cerebrum) ([facility] [address] [facility] 2019     Abd pain and poor intake prior to admit. ERCP and EUS yesterday with pancreatic mass noted with biliary stricture. Biopsy results pending but likely pancreatitic CA with possible lungs mets. Palliative Consult placed pending biopsy results with oncology following. Pt reports severe wt loss over past few months, poor intake during hospitalization in December as well. Pt reports # down to 180# but does not match wt hx (although this also shows wt loss). Intake has been poor for past month at home. Does not like Ensure milkshakes which she has had at nursing home in the past but open to trying Ensure Clear TID (720kcal, 24g protein) while on clear liquids. Pt anxious for diet advancement - discussed with RN to advance if lunch tolerated well (per GI notes). Will follow for diet advancement/tolerance, supplement acceptance, and plan of care.      Malnutrition Assessment:  Malnutrition Status:  Severe malnutrition    Context:  Acute illness     Findings of the 6 clinical characteristics of malnutrition:   Energy Intake:  7 - 50% or less of est energy requirements for 5 or more days  Weight Loss:  7.00 - Greater than 7.5% over 3 months     Body Fat Loss:  Unable to assess,     Muscle Mass Loss:  Unable to assess,    Fluid Accumulation:  No significant fluid accumulation,     Strength:  Not performed     Nutritionally Significant Medications: keppra, protonix, Kcl, NS @ 75ml/hr Estimated Daily Nutrient Needs:  Energy (kcal): 1639-1788kcal(MSJ x 1.2 x 1.1-1.2); Weight Used for Energy Requirements: (pt stated - 82kg)  Protein (g): 98g (1.2g/kg); Weight Used for Protein Requirements: (82kg - pt stated)  Fluid (ml/day): 1700; Method Used for Fluid Requirements: 1 ml/kcal    Nutrition Related Findings:       BM: 2/18  Edema: none  Wounds:  None       Current Nutrition Therapies:   Diet: clear liquids  Supplements: Ensure Clear TID (added today)  Meal intake:   Patient Vitals for the past 168 hrs:   % Diet Eaten   02/17/21 1841 0 %   02/17/21 1745 0 %     Anthropometric Measures:  · Height:  5' 5\" (165.1 cm)  · Current Body Wt:  81.6 kg (180 lb)(pt reported)   · Admission Body Wt:       · Usual Body Wt:        · Ideal Body Wt:   :  144 %   Wt Readings from Last 10 Encounters:   02/16/21 90.7 kg (200 lb)   01/29/21 90.7 kg (200 lb)   12/02/20 113.4 kg (250 lb)     Nutrition Diagnosis:   · Severe malnutrition related to inadequate protein-energy intake, altered GI function as evidenced by poor intake prior to admission, weight loss greater than or equal to 10% in 6 months, GI abnormality(likely pancreatic CA)      Nutrition Interventions:   Food and/or Nutrient Delivery: Start oral nutrition supplement, Modify current diet  Nutrition Education and Counseling: Education initiated(low fat briefly discussed )  Coordination of Nutrition Care: Continue to monitor while inpatient, Other (specify)    Goals:  Consumption of at least 50% meals and 2 supplements/day ; continued discussions regarding goals of care       Nutrition Monitoring and Evaluation:   Behavioral-Environmental Outcomes: None identified  Food/Nutrient Intake Outcomes: Food and nutrient intake, Supplement intake  Physical Signs/Symptoms Outcomes: GI status, Weight    Discharge Planning:     Too soon to determine     Ness Gaines, RD  [address] , Pager #[phone] or via [employer]

## 2021-02-18 NOTE — PROGRESS NOTES
Spoke with grand daughter Kirti   Discussed CT scans which are concerning for unresectable pancreatic cancer, possibly with metastasis to the lungs  She would like us to discuss prognosis/ management once pathology is available  Please consult palliative care    Jennifer Quigley MD, [facility]

## 2021-02-18 NOTE — PROGRESS NOTES
Bedside shift change report given to [address] [place] [address] (oncoming nurse) by Booker Santos RN (offgoing nurse). Report included the following information SBAR, Procedure Summary, Intake/Output, Recent Results and Med Rec Status.

## 2021-02-19 LAB
ALBUMIN SERPL-MCNC: 2 G/DL (ref 3.5–5)
ALBUMIN/GLOB SERPL: 0.4 {RATIO} (ref 1.1–2.2)
ALP SERPL-CCNC: 283 U/L (ref 45–117)
ALT SERPL-CCNC: 85 U/L (ref 12–78)
ANION GAP SERPL CALC-SCNC: 6 MMOL/L (ref 5–15)
AST SERPL-CCNC: 73 U/L (ref 15–37)
BASOPHILS # BLD: 0 K/UL (ref 0–0.1)
BASOPHILS NFR BLD: 0 % (ref 0–1)
BILIRUB SERPL-MCNC: 2.4 MG/DL (ref 0.2–1)
BUN SERPL-MCNC: 18 MG/DL (ref 6–20)
BUN/CREAT SERPL: 15 (ref 12–20)
CALCIUM SERPL-MCNC: 8.5 MG/DL (ref 8.5–10.1)
CHLORIDE SERPL-SCNC: 112 MMOL/L (ref 97–108)
CO2 SERPL-SCNC: 24 MMOL/L (ref 21–32)
CREAT SERPL-MCNC: 1.22 MG/DL (ref 0.55–1.02)
DIFFERENTIAL METHOD BLD: ABNORMAL
EOSINOPHIL # BLD: 0.2 K/UL (ref 0–0.4)
EOSINOPHIL NFR BLD: 1 % (ref 0–7)
ERYTHROCYTE [DISTWIDTH] IN BLOOD BY AUTOMATED COUNT: 17.3 % (ref 11.5–14.5)
GLOBULIN SER CALC-MCNC: 4.6 G/DL (ref 2–4)
GLUCOSE SERPL-MCNC: 125 MG/DL (ref 65–100)
HCT VFR BLD AUTO: 23.6 % (ref 35–47)
HGB BLD-MCNC: 7.3 G/DL (ref 11.5–16)
IMM GRANULOCYTES # BLD AUTO: 0.1 K/UL (ref 0–0.04)
IMM GRANULOCYTES NFR BLD AUTO: 1 % (ref 0–0.5)
LYMPHOCYTES # BLD: 1 K/UL (ref 0.8–3.5)
LYMPHOCYTES NFR BLD: 9 % (ref 12–49)
MCH RBC QN AUTO: 24.1 PG (ref 26–34)
MCHC RBC AUTO-ENTMCNC: 30.9 G/DL (ref 30–36.5)
MCV RBC AUTO: 77.9 FL (ref 80–99)
MONOCYTES # BLD: 1.4 K/UL (ref 0–1)
MONOCYTES NFR BLD: 12 % (ref 5–13)
NEUTS SEG # BLD: 8.7 K/UL (ref 1.8–8)
NEUTS SEG NFR BLD: 77 % (ref 32–75)
NRBC # BLD: 0 K/UL (ref 0–0.01)
NRBC BLD-RTO: 0 PER 100 WBC
PLATELET # BLD AUTO: 354 K/UL (ref 150–400)
PMV BLD AUTO: 10.7 FL (ref 8.9–12.9)
POTASSIUM SERPL-SCNC: 3 MMOL/L (ref 3.5–5.1)
PROT SERPL-MCNC: 6.6 G/DL (ref 6.4–8.2)
RBC # BLD AUTO: 3.03 M/UL (ref 3.8–5.2)
SODIUM SERPL-SCNC: 142 MMOL/L (ref 136–145)
WBC # BLD AUTO: 11.4 K/UL (ref 3.6–11)

## 2021-02-19 PROCEDURE — 65270000032 HC RM SEMIPRIVATE

## 2021-02-19 PROCEDURE — 94760 N-INVAS EAR/PLS OXIMETRY 1: CPT

## 2021-02-19 PROCEDURE — 99233 SBSQ HOSP IP/OBS HIGH 50: CPT | Performed by: INTERNAL MEDICINE

## 2021-02-19 PROCEDURE — 99223 1ST HOSP IP/OBS HIGH 75: CPT | Performed by: NURSE PRACTITIONER

## 2021-02-19 PROCEDURE — 74011250637 HC RX REV CODE- 250/637: Performed by: FAMILY MEDICINE

## 2021-02-19 PROCEDURE — 80053 COMPREHEN METABOLIC PANEL: CPT

## 2021-02-19 PROCEDURE — C9113 INJ PANTOPRAZOLE SODIUM, VIA: HCPCS | Performed by: FAMILY MEDICINE

## 2021-02-19 PROCEDURE — 74011250637 HC RX REV CODE- 250/637: Performed by: HOSPITALIST

## 2021-02-19 PROCEDURE — 74011250636 HC RX REV CODE- 250/636: Performed by: FAMILY MEDICINE

## 2021-02-19 PROCEDURE — 74011000250 HC RX REV CODE- 250: Performed by: FAMILY MEDICINE

## 2021-02-19 PROCEDURE — 36415 COLL VENOUS BLD VENIPUNCTURE: CPT

## 2021-02-19 PROCEDURE — 85025 COMPLETE CBC W/AUTO DIFF WBC: CPT

## 2021-02-19 RX ORDER — OXYCODONE HYDROCHLORIDE 5 MG/1
7.5 TABLET ORAL
Status: DISCONTINUED | OUTPATIENT
Start: 2021-02-19 | End: 2021-02-23 | Stop reason: HOSPADM

## 2021-02-19 RX ORDER — POTASSIUM CHLORIDE 750 MG/1
40 TABLET, FILM COATED, EXTENDED RELEASE ORAL
Status: COMPLETED | OUTPATIENT
Start: 2021-02-19 | End: 2021-02-19

## 2021-02-19 RX ADMIN — LEVETIRACETAM 500 MG: 500 TABLET ORAL at 18:10

## 2021-02-19 RX ADMIN — HYDRALAZINE HYDROCHLORIDE 50 MG: 50 TABLET, FILM COATED ORAL at 21:01

## 2021-02-19 RX ADMIN — OXYCODONE 5 MG: 5 TABLET ORAL at 15:46

## 2021-02-19 RX ADMIN — METOPROLOL TARTRATE 100 MG: 50 TABLET, FILM COATED ORAL at 08:32

## 2021-02-19 RX ADMIN — POTASSIUM CHLORIDE 40 MEQ: 750 TABLET, FILM COATED, EXTENDED RELEASE ORAL at 08:32

## 2021-02-19 RX ADMIN — Medication 10 ML: at 21:01

## 2021-02-19 RX ADMIN — HYDRALAZINE HYDROCHLORIDE 50 MG: 50 TABLET, FILM COATED ORAL at 15:46

## 2021-02-19 RX ADMIN — LEVETIRACETAM 500 MG: 500 TABLET ORAL at 08:32

## 2021-02-19 RX ADMIN — PANTOPRAZOLE SODIUM 40 MG: 40 INJECTION, POWDER, FOR SOLUTION INTRAVENOUS at 08:31

## 2021-02-19 RX ADMIN — PANTOPRAZOLE SODIUM 40 MG: 40 INJECTION, POWDER, FOR SOLUTION INTRAVENOUS at 18:10

## 2021-02-19 RX ADMIN — HYDRALAZINE HYDROCHLORIDE 50 MG: 50 TABLET, FILM COATED ORAL at 08:32

## 2021-02-19 RX ADMIN — OXYCODONE 5 MG: 5 TABLET ORAL at 06:26

## 2021-02-19 RX ADMIN — METOPROLOL TARTRATE 100 MG: 50 TABLET, FILM COATED ORAL at 18:10

## 2021-02-19 NOTE — PROGRESS NOTES
[facility] [place] [address] [address], [address]  W: [phone]  F: [phone]    Reason for consult    Herminio Nicholson is a 80 y.o. female who is seen in consultation at the request of  for evaluation of Pancreatic mas  History of Present Illness:   Patient is a 80 y.o. female with PMH as below is seen for pancreatic and lung mass. She is a poor historian and we also reached out to her daughter who was not aware of past Breast cancer treatment. She was admitted on 2/16/2021 with c/o increasing mid epigastric pain with nausea and emesis, weight loss and decreased appetite over several months. She also noted yellow discoloration of skin and eyes . Upon admission was noted to have a Bilirubin of 7.1, , , . CT abdomen showed a 4.7 x 3.8 cm mass in the pancreatic head, RP and hepatogastric nodes as well as a 2x 1.5 cm Left lung lesion. Interval history  She has controlled pain but is somnolent, fatigue, she has no nausea       Past Medical History:   Diagnosis Date    [facility] ([facility] [address] [facility]     Hypertension     Myocardial infarction ([facility] [address] [facility]     Seizure ([facility] [address] [facility]     Stroke (cerebrum) ([facility] [address] [facility] 2019      History reviewed. No pertinent surgical history. Social History     Tobacco Use    Smoking status: Never Smoker    Smokeless tobacco: Never Used   Substance Use Topics    Alcohol use: Never     Frequency: Never      History reviewed. No pertinent family history.   Current Facility-Administered Medications   Medication Dose Route Frequency    oxyCODONE IR (ROXICODONE) tablet 7.5 mg  7.5 mg Oral Q6H PRN    morphine injection 1 mg  1 mg IntraVENous Q6H PRN    sodium chloride (NS) flush 5-40 mL  5-40 mL IntraVENous Q8H    sodium chloride (NS) flush 5-40 mL  5-40 mL IntraVENous PRN    hydrALAZINE (APRESOLINE) tablet 50 mg  50 mg Oral TID    levETIRAcetam (KEPPRA) tablet 500 mg  500 mg Oral BID    meclizine (ANTIVERT) tablet 25 mg 25 mg Oral TID PRN    metoprolol tartrate (LOPRESSOR) tablet 100 mg  100 mg Oral BID    sodium chloride (NS) flush 5-40 mL  5-40 mL IntraVENous Q8H    sodium chloride (NS) flush 5-40 mL  5-40 mL IntraVENous PRN    0.9% sodium chloride infusion  75 mL/hr IntraVENous CONTINUOUS    ondansetron (ZOFRAN) injection 4 mg  4 mg IntraVENous Q4H PRN    pantoprazole (PROTONIX) 40 mg in 0.9% sodium chloride 10 mL injection  40 mg IntraVENous BID    hydrALAZINE (APRESOLINE) 20 mg/mL injection 10 mg  10 mg IntraVENous Q6H PRN      No Known Allergies     Review of Systems: A complete review of systems was obtained, negative except as described above. Physical Exam:     Visit Vitals  BP (!) 154/68   Pulse 87   Temp 98.6 °F (37 °C)   Resp 16   Ht 5' 5\" (1.651 m)   Wt 200 lb (90.7 kg)   SpO2 96%   BMI 33.28 kg/m²     ECOG PS: 2  General: No distress  Eyes: PERRLA, icteric  HENT: Atraumatic  Skin: No rashes, ecchymoses, or petechiae. Normal temperature, turgor, and texture. Psych: Alert, oriented    Results:     Lab Results   Component Value Date/Time    WBC 11.4 (H) 02/19/2021 04:38 AM    HGB 7.3 (L) 02/19/2021 04:38 AM    HCT 23.6 (L) 02/19/2021 04:38 AM    PLATELET 961 46/28/8595 04:38 AM    MCV 77.9 (L) 02/19/2021 04:38 AM    ABS. NEUTROPHILS 8.7 (H) 02/19/2021 04:38 AM     Lab Results   Component Value Date/Time    Sodium 142 02/19/2021 04:38 AM    Potassium 3.0 (L) 02/19/2021 04:38 AM    Chloride 112 (H) 02/19/2021 04:38 AM    CO2 24 02/19/2021 04:38 AM    Glucose 125 (H) 02/19/2021 04:38 AM    BUN 18 02/19/2021 04:38 AM    Creatinine 1.22 (H) 02/19/2021 04:38 AM    GFR est AA 50 (L) 02/19/2021 04:38 AM    GFR est non-AA 42 (L) 02/19/2021 04:38 AM    Calcium 8.5 02/19/2021 04:38 AM    Glucose (POC) 164 (H) 12/13/2020 07:29 PM     Lab Results   Component Value Date/Time    Bilirubin, total 2.4 (H) 02/19/2021 04:38 AM    ALT (SGPT) 85 (H) 02/19/2021 04:38 AM    Alk.  phosphatase 283 (H) 02/19/2021 04:38 AM    Protein, total 6.6 02/19/2021 04:38 AM    Albumin 2.0 (L) 02/19/2021 04:38 AM    Globulin 4.6 (H) 02/19/2021 04:38 AM         Records reviewed and summarized above. Pathology report(s) reviewed above. Radiology report(s) reviewed above. CT abdomen 2/16/2021  IMPRESSION     1. There is a 2 x 1.5 cm nodule left lung base which could represent metastatic  disease versus primary neoplasm. 2. There is intrahepatic ductal dilatation. There is extrahepatic ductal  dilatation. There is a mass in the region of the pancreatic head extending  cephalad obstructing the common duct. There is also dilatation of the pancreatic  duct in the body and tail and there is a normal-sized pancreatic duct in the  pancreatic head. There is slight narrowing of the portal vein. There is  hepatogastric ligament adenopathy. There is retroperitoneal adenopathy  Differential includes metastatic adenopathy obstructing the common duct and  involving the pancreatic head versus neoplasm of the pancreatic head extending  superiorly with retroperitoneal and hepatogastric ligament adenopathy.     The gallbladder is distended and there are gallstones. Assessment:   1) Pancreatic head mass, RP nodes and lung lesions    Pancreatic adenocarcinoma- unresectable and possibly metastatic depending on the nature of the lung nodules  I reviewed scans and path with [facility] her grand daughter who clearly stated she wanst her grandmother comfortable and home  She is interested in discussing a Celiac plexus block if that helps    2) Left lung nodule  CT chest    3) H/O Breast cancer    4) h/o MI    5) H/O Stroke        Plan:     · Consult palliative care, consider Celiac plexus block  · Family is interested to take her home with hosipce    I appreciate the opportunity to participate in Ms. [address] [place]'s care.     Signed By: Dylan Lopez MD

## 2021-02-19 NOTE — PROGRESS NOTES
Bedside shift change report given to [address] (oncoming nurse) by Aren Flores (offgoing nurse). Report included the following information SBAR, Kardex, Intake/Output, MAR and Recent Results.

## 2021-02-19 NOTE — CONSULTS
Palliative Medicine Consult  Joe: [phone] ([phone])    Patient Name: Marry Berg  YOB: 1931    Date of Initial Consult: February 19, 2021  Reason for Consult: Care Decisions   Requesting Provider:  Dr. Nhung Medel   Primary Care Physician: Erica Avitia MD     SUMMARY:   Marry Berg is a 80 y.o. with a past history of HTN, breast cancer, seizure disorder, MI, stroke, who was admitted on 2/16/2021 from home with a diagnosis of nausea, vomiting, anorexia, abdominal pain 2/2 pancreatic mass, obstructive jaundice, gall stones, hypokalemia, anemia     Current medical issues leading to Palliative Medicine involvement include: pt with advanced disease and poor prognosis. We have been asked to support with goals of care. Full code  Social: lives at home with 24 hour care giver     PALLIATIVE DIAGNOSES:   1. Metastatic disease  2. Hypoalbuminemia  3. Cancer related pain  4. Jaundice      PLAN:   1. Pt seen. She is somnolent and not engaging in any dialogue. Spoke with Granddaughter, Fredericka Goodpasture [facility] pending[facility] and introduced services  2. [facility] has a clear understanding of the patient's condition  3. Discussed options for care and she is interested in keeping the pt comfortable   4. A celiac block was discussed with Dr. Bhavesh Obando. I have placed a new order for GI to evaluate for this  5. All questions and concerns addressed   6. Once the block is done we can consult hospice for outpatient services  7. Will follow up with [facility] on Monday    8. Initial consult note routed to primary continuity provider and/or primary health care team members  9.  Communicated plan of care with: Palliative Amanda SÁNCHEZ Team     GOALS OF CARE / TREATMENT PREFERENCES:     GOALS OF CARE:  Patient/Health Care Proxy Stated Goals: Comfort    TREATMENT PREFERENCES:   Code Status: Full Code    Advance Care Planning:  [x] [facility] Interdisciplinary Team has updated the ACP Navigator with Health Care Decision Maker and Patient Capacity      Primary Decision Maker (Active): lalo wyatt - Grandchild - [phone]      No flowsheet data found. Medical Interventions: Comfort measures     Other Instructions:   Artificially Administered Nutrition: No feeding tube     Other:    As far as possible, the palliative care team has discussed with patient / health care proxy about goals of care / treatment preferences for patient. HISTORY:     History obtained from:  Chart, team, family    CHIEF COMPLAINT: pt admitted with aforementioned history and issues    HPI/SUBJECTIVE:    The patient is:   [] Verbal and participatory  [x] Non-participatory due to:   Medical condition      Clinical Pain Assessment (nonverbal scale for severity on nonverbal patients):   Clinical Pain Assessment  Severity: 0          Duration: for how long has pt been experiencing pain (e.g., 2 days, 1 month, years)  Frequency: how often pain is an issue (e.g., several times per day, once every few days, constant)     FUNCTIONAL ASSESSMENT:     Palliative Performance Scale (PPS):  PPS: 40       PSYCHOSOCIAL/SPIRITUAL SCREENING:     Palliative IDT has assessed this patient for cultural preferences / practices and a referral made as appropriate to needs (Cultural Services, Patient Advocacy, Ethics, etc.)    Any spiritual / Confucianist concerns:  [] Yes /  [x] No    Caregiver Burnout:  [] Yes /  [x] No /  [] No Caregiver Present      Anticipatory grief assessment:   [x] Normal  / [] Maladaptive       ESAS Anxiety: Anxiety: 0    ESAS Depression:          REVIEW OF SYSTEMS:     Positive and pertinent negative findings in ROS are noted above in HPI. The following systems were [x] reviewed objectively / [] unable to be reviewed as noted in HPI  Other findings are noted below. Systems: constitutional, ears/nose/mouth/throat, respiratory, gastrointestinal, genitourinary, musculoskeletal, integumentary, neurologic, psychiatric, endocrine. Positive findings noted below. Modified ESAS Completed by: provider   Fatigue: 8 Drowsiness: 4     Pain: 0   Anxiety: 0       Dyspnea: 0           Stool Occurrence(s): 1        PHYSICAL EXAM:     From RN flowsheet:  Wt Readings from Last 3 Encounters:   02/16/21 200 lb (90.7 kg)   01/29/21 200 lb (90.7 kg)   12/02/20 250 lb (113.4 kg)     Blood pressure (!) 154/68, pulse 87, temperature 98.6 °F (37 °C), resp. rate 16, height 5' 5\" (1.651 m), weight 200 lb (90.7 kg), SpO2 96 %. Pain Scale 1: Numeric (0 - 10)  Pain Intensity 1: 8  Pain Onset 1: ongoing  Pain Location 1: Abdomen  Pain Orientation 1: Mid  Pain Description 1: Sore  Pain Intervention(s) 1: Medication (see MAR)  Last bowel movement, if known:     Constitutional: somnolent  Eyes: closed  ENMT: no nasal discharge  Cardiovascular:   Respiratory: breathing not labored, symmetric  Gastrointestinal: soft non-tender  Musculoskeletal: no deformity, no tenderness to palpation  Skin: warm, dry  Neurologic:somnolent  Psychiatric: calm  Other:       HISTORY:     Active Problems:    Pancreatic mass (2/16/2021)      Past Medical History:   Diagnosis Date    [facility]     Hypertension     [facility] infarction [facility]     Seizure [facility]     Stroke (cerebrum) ([facility] 2019      History reviewed. No pertinent surgical history. History reviewed. No pertinent family history. History reviewed, no pertinent family history.   Social History     Tobacco Use    Smoking status: Never Smoker    Smokeless tobacco: Never Used   Substance Use Topics    Alcohol use: Never     Frequency: Never     No Known Allergies   Current Facility-Administered Medications   Medication Dose Route Frequency    oxyCODONE IR (ROXICODONE) tablet 7.5 mg  7.5 mg Oral Q6H PRN    morphine injection 1 mg  1 mg IntraVENous Q6H PRN    sodium chloride (NS) flush 5-40 mL  5-40 mL IntraVENous Q8H    sodium chloride (NS) flush 5-40 mL  5-40 mL IntraVENous PRN    hydrALAZINE (APRESOLINE) tablet 50 mg  50 mg Oral TID    levETIRAcetam (KEPPRA) tablet 500 mg  500 mg Oral BID    meclizine (ANTIVERT) tablet 25 mg  25 mg Oral TID PRN    metoprolol tartrate (LOPRESSOR) tablet 100 mg  100 mg Oral BID    sodium chloride (NS) flush 5-40 mL  5-40 mL IntraVENous Q8H    sodium chloride (NS) flush 5-40 mL  5-40 mL IntraVENous PRN    0.9% sodium chloride infusion  75 mL/hr IntraVENous CONTINUOUS    ondansetron (ZOFRAN) injection 4 mg  4 mg IntraVENous Q4H PRN    pantoprazole (PROTONIX) 40 mg in 0.9% sodium chloride 10 mL injection  40 mg IntraVENous BID    hydrALAZINE (APRESOLINE) 20 mg/mL injection 10 mg  10 mg IntraVENous Q6H PRN          LAB AND IMAGING FINDINGS:     Lab Results   Component Value Date/Time    WBC 11.4 (H) 02/19/2021 04:38 AM    HGB 7.3 (L) 02/19/2021 04:38 AM    PLATELET 276 85/87/1967 04:38 AM     Lab Results   Component Value Date/Time    Sodium 142 02/19/2021 04:38 AM    Potassium 3.0 (L) 02/19/2021 04:38 AM    Chloride 112 (H) 02/19/2021 04:38 AM    CO2 24 02/19/2021 04:38 AM    BUN 18 02/19/2021 04:38 AM    Creatinine 1.22 (H) 02/19/2021 04:38 AM    Calcium 8.5 02/19/2021 04:38 AM      Lab Results   Component Value Date/Time    Alk. phosphatase 283 (H) 02/19/2021 04:38 AM    Protein, total 6.6 02/19/2021 04:38 AM    Albumin 2.0 (L) 02/19/2021 04:38 AM    Globulin 4.6 (H) 02/19/2021 04:38 AM     Lab Results   Component Value Date/Time    INR 1.0 12/02/2020 11:30 AM    Prothrombin time 13.7 12/02/2020 11:30 AM      No results found for: IRON, FE, TIBC, IBCT, PSAT, FERR   No results found for: PH, PCO2, PO2  No components found for: GLPOC   No results found for: CPK, CKMB             Total time: 70 minutes  Counseling / coordination time, spent as noted above: 60 minutes  > 50% counseling / coordination?: y    Prolonged service was provided for  []30 min   []75 min in face to face time in the presence of the patient, spent as noted above.   Time Start:   Time End:   Note: this can only be billed with 18572 (initial) or 11246 (follow up). If multiple start / stop times, list each separately.

## 2021-02-19 NOTE — PROGRESS NOTES
JUNE: Patient lives at home with her caregiver Dalton who is with patient 24/7. Patient was open with home health prior to admission (family unsure of which agency, CM to follow-up with Granddaughter [phone]). PT/OT evals pending. Care Management Interventions  PCP Verified by CM: Yes  Mode of Transport at Discharge: Other (see comment)(family/car)  Transition of Care Consult (CM Consult): Discharge Planning  MyChart Signup: No  Discharge Durable Medical Equipment: No  Physical Therapy Consult: Yes  Occupational Therapy Consult: Yes  Speech Therapy Consult: No  Current Support Network: Lives with Caregiver, Family Lives Nearby  Confirm Follow Up Transport: Family  The Patient and/or Patient Representative was Provided with a Choice of Provider and Agrees with the Discharge Plan?: Yes  Freedom of Choice List was Provided with Basic Dialogue that Supports the Patient's Individualized Plan of Care/Goals, Treatment Preferences and Shares the Quality Data Associated with the Providers?: Yes  Discharge Location  Discharge Placement: Other:(TBD, pending therapy evals )     Reason for Admission:                      RUR Score:       16%              Plan for utilizing home health:     Patient was open with [place] [facility], family unsure which agency. PCP: First and Last name:  Carmela Fenton MD   Name of Practice:    Are you a current patient: Yes/No: yes   Approximate date of last visit: virtual visit done one month ago   Can you participate in a virtual visit with your PCP: yes                    Current Advanced Directive/Advance Care Plan: granddadrea Cotto states she is POA, CM requested she bring paperwork to hospital    Healthcare Decision Maker:   Click here to complete [place] including [address] Relationship (ie \"Primary\")                         Transition of Care Plan:   TBD-therapy evals pending. Chart reviewed.  CM met with patient at bedside. Patient gave permission for CM to contact her granddaughter, Kirti #[phone]. CM spoke with Kirti via phone. Kirti stated that she is both medical and finncial POA. CM requested that she bring this paperwork to the hospital to be scanned into the chart. Per the granddaughter, patient lives at home with her caregiver/close family friend Neptali Schwartz, in a one story home with two stairs to enter. The caregiver is with patient 24/7 and assists with ADLs. Patient owns a rolling walker, shower chair and bedside commode, and raised toilet seat. Per the granddaughter, patient was ambulating with a walker with assistance prior to admission. Patient gets prescriptions filled at [facility] on [address]. The granddaughter stated that patient recently had a stroke and was in rehab in [place]. Patient has been home since January 1 and has been getting home health SN and PT. Granddaughter unsure of the [facility] agency but will find out this information. CM to follow-up on which home health agency patient is open with and get resumption orders if plan is home with [facility]. Care management will follow for therapy recommendations/discharge planning.     LINDSEY Cooper/SOFIA

## 2021-02-19 NOTE — PROGRESS NOTES
[address]  [address] [facility], [address]  [phone]                     GI PROGRESS NOTE    Patient Name: Kamilah Barker      : 9/3/1931      MRN: [id number]  Admit Date: 2021  Today's Date: 2021    Subjective:     Chart review  Path from EUS positive for adenocarcinoma. Dr. Sahil Gamble following. LFTs have continued to trend down. Objective:     Blood pressure (!) 162/80, pulse 85, temperature 98.5 °F (36.9 °C), resp. rate 16, height 5' 5\" (1.651 m), weight 90.7 kg (200 lb), SpO2 94 %. Physical Exam:      Data Review:    Recent Results (from the past 24 hour(s))   METABOLIC PANEL, COMPREHENSIVE    Collection Time: 21  4:38 AM   Result Value Ref Range    Sodium 142 136 - 145 mmol/L    Potassium 3.0 (L) 3.5 - 5.1 mmol/L    Chloride 112 (H) 97 - 108 mmol/L    CO2 24 21 - 32 mmol/L    Anion gap 6 5 - 15 mmol/L    Glucose 125 (H) 65 - 100 mg/dL    BUN 18 6 - 20 MG/DL    Creatinine 1.22 (H) 0.55 - 1.02 MG/DL    BUN/Creatinine ratio 15 12 - 20      GFR est AA 50 (L) >60 ml/min/1.73m2    GFR est non-AA 42 (L) >60 ml/min/1.73m2    Calcium 8.5 8.5 - 10.1 MG/DL    Bilirubin, total 2.4 (H) 0.2 - 1.0 MG/DL    ALT (SGPT) 85 (H) 12 - 78 U/L    AST (SGOT) 73 (H) 15 - 37 U/L    Alk.  phosphatase 283 (H) 45 - 117 U/L    Protein, total 6.6 6.4 - 8.2 g/dL    Albumin 2.0 (L) 3.5 - 5.0 g/dL    Globulin 4.6 (H) 2.0 - 4.0 g/dL    A-G Ratio 0.4 (L) 1.1 - 2.2     CBC WITH AUTOMATED DIFF    Collection Time: 21  4:38 AM   Result Value Ref Range    WBC 11.4 (H) 3.6 - 11.0 K/uL    RBC 3.03 (L) 3.80 - 5.20 M/uL    HGB 7.3 (L) 11.5 - 16.0 g/dL    HCT 23.6 (L) 35.0 - 47.0 %    MCV 77.9 (L) 80.0 - 99.0 FL    MCH 24.1 (L) 26.0 - 34.0 PG    MCHC 30.9 30.0 - 36.5 g/dL    RDW 17.3 (H) 11.5 - 14.5 %    PLATELET 582 144 - 400 K/uL    MPV 10.7 8.9 - 12.9 FL    NRBC 0.0 0  WBC    ABSOLUTE NRBC 0.00 0.00 - 0.01 K/uL    NEUTROPHILS 77 (H) 32 - 75 %    LYMPHOCYTES 9 (L) 12 - 49 % MONOCYTES 12 5 - 13 %    EOSINOPHILS 1 0 - 7 %    BASOPHILS 0 0 - 1 %    IMMATURE GRANULOCYTES 1 (H) 0.0 - 0.5 %    ABS. NEUTROPHILS 8.7 (H) 1.8 - 8.0 K/UL    ABS. LYMPHOCYTES 1.0 0.8 - 3.5 K/UL    ABS. MONOCYTES 1.4 (H) 0.0 - 1.0 K/UL    ABS. EOSINOPHILS 0.2 0.0 - 0.4 K/UL    ABS. BASOPHILS 0.0 0.0 - 0.1 K/UL    ABS. IMM. GRANS. 0.1 (H) 0.00 - 0.04 K/UL    DF AUTOMATED           Assessment / Plan :       89-yo with 4 months of persistent epigastric pain, intermittent N/V and a weight loss of 50 lbs over the past 3 months with new onset scleral icterus, prompting an ER visit yesterday.      Pancreatic mass, jaundice/scleral icterus, elevated LFTs, weight loss  - CT shows a 4.7 x 3.8 cm mass in the pancreatic head causing obstruction of the CBD  - Monitor LFTs -- Continue to trend down, TBili 2.4  - EUS 2/17 - A heterogeneous, poorly defined mass was noted in the pancreatic head on the superior aspect and was extending towards angela hepatis. Mass was seen infiltrating into the portal vein. Mass measured about 43 mm x 35 mm in size. Bile duct was obstructed in the intrapancreatic portion due to the mass. Pancreatic duct was dilated tortuous Nba in the entire pancreas and ended abruptly in this mass. No infiltration into duodenum was noted. Pancreatic body and tail were otherwise unremarkable except for tortuous Nba dilated pancreatic duct and side branches. 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 The stricture was about 3 cm in length. There was no mucosal irregularity or filling defect noted. No luminal mass was noted. Bile duct proximal to the stricture was diffusely and tortuously dilated. Biliary sphincterotomy was performed using E RB E.  1 plastic biliary stent was placed in the common bile duct (10 Fr X 9 cm, [employer]).     - PATH Positive for pancreatic adenocarcinoma - Dr. Sandeep Keith following  - Stent change in 3 months, our staff will coordinate.  - GI is signing off, please call again if needed during this admission.      Hypokalemia  - repletion per hospitalist - getting PO and IV K          Patient Active Hospital Problem List:   Active Problems:    Pancreatic mass (2/16/2021)    I have personally reviewed the history and independently examined the patient. I have reviewed the chart and agree with the documentation recorded by the Mid Level Provider, including the assessment, treatment plan, and disposition.     Finn Jennings PA-C

## 2021-02-19 NOTE — PROGRESS NOTES
[address] [facility]  Hospitalist Group                                                                                          Hospitalist Progress Note  Carol Galeana MD  Answering service: [phone] or [phone] from in house phone        Date of Service:  2021  NAME:  Leoncio Case  :  9/3/1931  MRN:  [id number]      Admission Summary:   Leoncio Case is a 80 y.o. female who presents with abdominal pain, and jaundice. Interval history / Subjective:     Patient seen and examined. She states that she was nauseous,has abdominal pain. Assessment & Plan:     Pancreatic adeno carcinoma  Obstructive jaundice  Cancer related pain  - GI following, s/p ERCP and EUS  - she had a Bile duct stricture-stent placed  - diet advanced today  - discussed with the patient that she can get IV morphine or oxycodone for pain,encouraged her to ask the nursing staff when in pain. -GI and onc following  -path report-adenocarcinoma  -OP Gi follow up for stent change, no aspirin for 7 days  miralax    HTN - continue hydralazine and metoprolol. Hold losartan as that she was not on her home meds. Hypokalemia - replete 40 meqX 1  Mild elevation of creatinine - continue IVF  H/o seizure disorder  - on keppra  Anemia - likely due to chronic disease due to malignancy. S monitor hgb, and transfuse if less than 7. Holding ASA for now. H/o breast cancer - reported in remission, was > 10 year ago per patient. PT /Ot eval - per grand daughter she had stroke earlier this year and currently at home ,she was able to ambulate with walker with help    Discussed with her grand daughter about the path report -pancreatic cancer.      Code status: FULL(Her grand daughter said she is POA, will bring paperwork tomorrow and she would like to discuss with the patient about resuscitation tomorrow)          Care Plan discussed with: Patient/Family  Anticipated Disposition: Home w/Family  Anticipated Discharge:  hours to 48 hours     Hospital Problems  Date Reviewed: 2/17/2021          Codes Class Noted POA    Pancreatic mass ICD-10-CM: K86.89  ICD-9-CM: 577.8  2/16/2021 Unknown                Review of Systems:   A comprehensive review of systems was negative except for that written in the HPI. Vital Signs:    Last 24hrs VS reviewed since prior progress note. Most recent are:  Visit Vitals  BP (!) 154/68   Pulse 87   Temp 98.6 °F (37 °C)   Resp 16   Ht 5' 5\" (1.651 m)   Wt 90.7 kg (200 lb)   SpO2 96%   BMI 33.28 kg/m²       No intake or output data in the 24 hours ending 02/19/21 1548     Physical Examination:     I had a face to face encounter with this patient and independently examined them on 2/19/2021 as outlined below:          Constitutional:   elderly patient    ENT:  Oral mucosa moist, oropharynx benign. + scleral icterus, upper lip swelling improving   Resp:  CTA bilaterally. No wheezing/rhonchi/rales. No accessory muscle use   CV:  Regular rhythm, normal rate, no murmurs, S1, S2 wnl    GI:  Soft, non distended, + tender to palpation. bowel sounds +    Musculoskeletal:  No LE edema, warm, 2+ pulses throughout    Neurologic:  Moves all extremities. Alert and oriented ,hard of hearing     Psych:  Not anxious nor agitated.        Data Review:    Review and/or order of clinical lab test  Review and/or order of tests in the radiology section of CPT  Review and/or order of tests in the medicine section of CPT      Labs:     Recent Labs     02/19/21 0438 02/18/21  0500   WBC 11.4* 14.6*   HGB 7.3* 7.2*   HCT 23.6* 24.0*    337     Recent Labs     02/19/21 0438 02/18/21  0500 02/17/21  1430 02/17/21  0349    142  --  140   K 3.0* 3.6 2.7* 2.5*   * 111*  --  107   CO2 24 22  --  25   BUN 18 13  --  10   CREA 1.22* 0.94  --  0.78   * 103*  --  98   CA 8.5 8.9  --  8.7     Recent Labs     02/19/21  0438 02/18/21  0500 02/17/21  0349   ALT 85* 125* 153*   * 335* 348*   TBILI 2.4* 3.7* 7.1*   TP 6.6 6.6 6.8   ALB 2.0* 2.1* 2.2*   GLOB 4.6* 4.5* 4.6*     No results for input(s): INR, PTP, APTT, INREXT, INREXT in the last 72 hours. No results for input(s): FE, TIBC, PSAT, FERR in the last 72 hours. No results found for: FOL, RBCF   No results for input(s): PH, PCO2, PO2 in the last 72 hours.   Recent Labs     02/17/21  1430 02/16/21  1910   TROIQ 0.07* <0.05     Lab Results   Component Value Date/Time    Cholesterol, total 190 12/03/2020 04:00 AM    HDL Cholesterol 59 12/03/2020 04:00 AM    LDL, calculated 107 (H) 12/03/2020 04:00 AM    Triglyceride 120 12/03/2020 04:00 AM    CHOL/HDL Ratio 3.2 12/03/2020 04:00 AM     Lab Results   Component Value Date/Time    Glucose (POC) 164 (H) 12/13/2020 07:29 PM    Glucose (POC) 190 (H) 12/13/2020 04:57 PM    Glucose (POC) 137 (H) 12/12/2020 09:19 PM    Glucose (POC) 204 (H) 12/12/2020 11:54 AM    Glucose (POC) 138 (H) 12/12/2020 07:54 AM     Lab Results   Component Value Date/Time    Color Yellow/Straw 01/29/2021 10:00 PM    Appearance Clear 01/29/2021 10:00 PM    Specific gravity 1.024 01/29/2021 10:00 PM    pH (UA) 5.0 01/29/2021 10:00 PM    Protein 30 (A) 01/29/2021 10:00 PM    Glucose 150 (A) 01/29/2021 10:00 PM    Ketone Negative 01/29/2021 10:00 PM    Bilirubin Negative 01/29/2021 10:00 PM    Urobilinogen 2.0 (H) 01/29/2021 10:00 PM    Nitrites Negative 01/29/2021 10:00 PM    Leukocyte Esterase Negative 01/29/2021 10:00 PM    Bacteria Negative 01/29/2021 10:00 PM    WBC 0-4 01/29/2021 10:00 PM    RBC 0-5 01/29/2021 10:00 PM         Medications Reviewed:     Current Facility-Administered Medications   Medication Dose Route Frequency    oxyCODONE IR (ROXICODONE) tablet 7.5 mg  7.5 mg Oral Q6H PRN    morphine injection 1 mg  1 mg IntraVENous Q6H PRN    sodium chloride (NS) flush 5-40 mL  5-40 mL IntraVENous Q8H    sodium chloride (NS) flush 5-40 mL  5-40 mL IntraVENous PRN    hydrALAZINE (APRESOLINE) tablet 50 mg  50 mg Oral TID    levETIRAcetam (KEPPRA) tablet 500 mg  500 mg Oral BID    meclizine (ANTIVERT) tablet 25 mg  25 mg Oral TID PRN    metoprolol tartrate (LOPRESSOR) tablet 100 mg  100 mg Oral BID    sodium chloride (NS) flush 5-40 mL  5-40 mL IntraVENous Q8H    sodium chloride (NS) flush 5-40 mL  5-40 mL IntraVENous PRN    0.9% sodium chloride infusion  75 mL/hr IntraVENous CONTINUOUS    ondansetron (ZOFRAN) injection 4 mg  4 mg IntraVENous Q4H PRN    pantoprazole (PROTONIX) 40 mg in 0.9% sodium chloride 10 mL injection  40 mg IntraVENous BID    hydrALAZINE (APRESOLINE) 20 mg/mL injection 10 mg  10 mg IntraVENous Q6H PRN     ______________________________________________________________________  EXPECTED LENGTH OF STAY: 3d 2h  ACTUAL LENGTH OF STAY:          3                 Efrain Gilbert MD

## 2021-02-20 LAB
ALBUMIN SERPL-MCNC: 2.1 G/DL (ref 3.5–5)
ALBUMIN/GLOB SERPL: 0.4 {RATIO} (ref 1.1–2.2)
ALP SERPL-CCNC: 254 U/L (ref 45–117)
ALT SERPL-CCNC: 63 U/L (ref 12–78)
ANION GAP SERPL CALC-SCNC: 8 MMOL/L (ref 5–15)
AST SERPL-CCNC: 48 U/L (ref 15–37)
BASOPHILS # BLD: 0 K/UL (ref 0–0.1)
BASOPHILS NFR BLD: 0 % (ref 0–1)
BILIRUB SERPL-MCNC: 2.4 MG/DL (ref 0.2–1)
BUN SERPL-MCNC: 16 MG/DL (ref 6–20)
BUN/CREAT SERPL: 17 (ref 12–20)
CALCIUM SERPL-MCNC: 8.7 MG/DL (ref 8.5–10.1)
CHLORIDE SERPL-SCNC: 114 MMOL/L (ref 97–108)
CO2 SERPL-SCNC: 21 MMOL/L (ref 21–32)
CREAT SERPL-MCNC: 0.94 MG/DL (ref 0.55–1.02)
DIFFERENTIAL METHOD BLD: ABNORMAL
EOSINOPHIL # BLD: 0.1 K/UL (ref 0–0.4)
EOSINOPHIL NFR BLD: 1 % (ref 0–7)
ERYTHROCYTE [DISTWIDTH] IN BLOOD BY AUTOMATED COUNT: 17.6 % (ref 11.5–14.5)
GLOBULIN SER CALC-MCNC: 4.7 G/DL (ref 2–4)
GLUCOSE SERPL-MCNC: 135 MG/DL (ref 65–100)
HCT VFR BLD AUTO: 24.6 % (ref 35–47)
HGB BLD-MCNC: 7.3 G/DL (ref 11.5–16)
IMM GRANULOCYTES # BLD AUTO: 0.1 K/UL (ref 0–0.04)
IMM GRANULOCYTES NFR BLD AUTO: 0 % (ref 0–0.5)
LYMPHOCYTES # BLD: 1.1 K/UL (ref 0.8–3.5)
LYMPHOCYTES NFR BLD: 9 % (ref 12–49)
MCH RBC QN AUTO: 23.7 PG (ref 26–34)
MCHC RBC AUTO-ENTMCNC: 29.7 G/DL (ref 30–36.5)
MCV RBC AUTO: 79.9 FL (ref 80–99)
MONOCYTES # BLD: 1.2 K/UL (ref 0–1)
MONOCYTES NFR BLD: 10 % (ref 5–13)
NEUTS SEG # BLD: 9.4 K/UL (ref 1.8–8)
NEUTS SEG NFR BLD: 80 % (ref 32–75)
NRBC # BLD: 0 K/UL (ref 0–0.01)
NRBC BLD-RTO: 0 PER 100 WBC
PLATELET # BLD AUTO: 348 K/UL (ref 150–400)
PMV BLD AUTO: 10.8 FL (ref 8.9–12.9)
POTASSIUM SERPL-SCNC: 3.4 MMOL/L (ref 3.5–5.1)
PROT SERPL-MCNC: 6.8 G/DL (ref 6.4–8.2)
RBC # BLD AUTO: 3.08 M/UL (ref 3.8–5.2)
SODIUM SERPL-SCNC: 143 MMOL/L (ref 136–145)
WBC # BLD AUTO: 11.9 K/UL (ref 3.6–11)

## 2021-02-20 PROCEDURE — 97530 THERAPEUTIC ACTIVITIES: CPT | Performed by: PHYSICAL THERAPIST

## 2021-02-20 PROCEDURE — 36415 COLL VENOUS BLD VENIPUNCTURE: CPT

## 2021-02-20 PROCEDURE — 80053 COMPREHEN METABOLIC PANEL: CPT

## 2021-02-20 PROCEDURE — 74011250636 HC RX REV CODE- 250/636: Performed by: HOSPITALIST

## 2021-02-20 PROCEDURE — 74011000250 HC RX REV CODE- 250: Performed by: FAMILY MEDICINE

## 2021-02-20 PROCEDURE — 74011250636 HC RX REV CODE- 250/636: Performed by: FAMILY MEDICINE

## 2021-02-20 PROCEDURE — 85025 COMPLETE CBC W/AUTO DIFF WBC: CPT

## 2021-02-20 PROCEDURE — 74011250637 HC RX REV CODE- 250/637: Performed by: HOSPITALIST

## 2021-02-20 PROCEDURE — C9113 INJ PANTOPRAZOLE SODIUM, VIA: HCPCS | Performed by: FAMILY MEDICINE

## 2021-02-20 PROCEDURE — 97161 PT EVAL LOW COMPLEX 20 MIN: CPT | Performed by: PHYSICAL THERAPIST

## 2021-02-20 PROCEDURE — 74011250637 HC RX REV CODE- 250/637: Performed by: FAMILY MEDICINE

## 2021-02-20 PROCEDURE — 65270000032 HC RM SEMIPRIVATE

## 2021-02-20 PROCEDURE — 77030038269 HC DRN EXT URIN PURWCK BARD -A

## 2021-02-20 RX ORDER — HYDRALAZINE HYDROCHLORIDE 50 MG/1
100 TABLET, FILM COATED ORAL 3 TIMES DAILY
Status: DISCONTINUED | OUTPATIENT
Start: 2021-02-20 | End: 2021-02-20

## 2021-02-20 RX ADMIN — METOPROLOL TARTRATE 100 MG: 50 TABLET, FILM COATED ORAL at 10:05

## 2021-02-20 RX ADMIN — MORPHINE SULFATE 1 MG: 2 INJECTION, SOLUTION INTRAMUSCULAR; INTRAVENOUS at 02:46

## 2021-02-20 RX ADMIN — ONDANSETRON 4 MG: 2 INJECTION INTRAMUSCULAR; INTRAVENOUS at 05:06

## 2021-02-20 RX ADMIN — Medication 10 ML: at 05:07

## 2021-02-20 RX ADMIN — HYDRALAZINE HYDROCHLORIDE 50 MG: 50 TABLET, FILM COATED ORAL at 10:05

## 2021-02-20 RX ADMIN — HYDRALAZINE HYDROCHLORIDE 75 MG: 50 TABLET, FILM COATED ORAL at 16:09

## 2021-02-20 RX ADMIN — MORPHINE SULFATE 1 MG: 2 INJECTION, SOLUTION INTRAMUSCULAR; INTRAVENOUS at 10:24

## 2021-02-20 RX ADMIN — HYDRALAZINE HYDROCHLORIDE 75 MG: 50 TABLET, FILM COATED ORAL at 21:17

## 2021-02-20 RX ADMIN — PANTOPRAZOLE SODIUM 40 MG: 40 INJECTION, POWDER, FOR SOLUTION INTRAVENOUS at 17:26

## 2021-02-20 RX ADMIN — PANTOPRAZOLE SODIUM 40 MG: 40 INJECTION, POWDER, FOR SOLUTION INTRAVENOUS at 10:08

## 2021-02-20 RX ADMIN — LEVETIRACETAM 500 MG: 500 TABLET ORAL at 17:32

## 2021-02-20 RX ADMIN — Medication 10 ML: at 21:17

## 2021-02-20 RX ADMIN — LEVETIRACETAM 500 MG: 500 TABLET ORAL at 10:05

## 2021-02-20 RX ADMIN — HYDRALAZINE HYDROCHLORIDE 10 MG: 20 INJECTION INTRAMUSCULAR; INTRAVENOUS at 05:06

## 2021-02-20 RX ADMIN — METOPROLOL TARTRATE 100 MG: 50 TABLET, FILM COATED ORAL at 17:32

## 2021-02-20 NOTE — PROGRESS NOTES
JUNE; CRM   CRM reached out to the niece of patient and called [facility]. I left her a vm and then called patient's caregiver Rufino Hinkle. Per Rufino Hinkle niece is out of town and unavailable until Monday Feb 22,2021. Per Rufino Hinkle she was planning on being available for patient 24/7 starting on the 22nd in the evening. She also thought that patient had used [facility] IN THE PAST FOR PT AND OT. THEIR PHONE -[phone]. I appreciated  Note from Palliative Care who will follow up on Monday.

## 2021-02-20 NOTE — PROGRESS NOTES
Problem: Mobility Impaired (Adult and Pediatric)  Goal: *Acute Goals and Plan of Care (Insert Text)  Description: FUNCTIONAL STATUS PRIOR TO ADMISSION: Patient unable to provide much information. Per chart patient with RW at baseline and has support from caregiver or family at home. HOME SUPPORT PRIOR TO ADMISSION: The patient lived with family and has caregiver per chart. Physical Therapy Goals  Initiated 2/20/2021  1. Patient will move from supine to sit and sit to supine  in bed with minimal assistance within 7 day(s). 2.  Patient will transfer from bed to chair and chair to bed with minimal assistance using the least restrictive device within 7 day(s). 3.  Patient will perform sit to stand with moderate assistance  within 7 day(s). 4.  Patient will ambulate with minimal assistance for 10 feet with the least restrictive device within 7 day(s). Outcome: Progressing Towards Goal   PHYSICAL THERAPY EVALUATION  Patient: Luh Baumann (46 y.o. female)  Date: 2/20/2021  Primary Diagnosis: Pancreatic mass [K86.89]  Procedure(s) (LRB):  ENDOSCOPIC RETROGRADE CHOLANGIOPANCREATOGRAPHY (ERCP) (N/A)  ENDOSCOPIC ULTRASOUND (EUS) (N/A)  ESOPHAGOGASTRODUODENOSCOPY (EGD) (N/A)  FINE NEEDLE BIOPSY (N/A)  ENDOSCOPIC SPHINCTEROTOMY (N/A)  BILIARY STENT PLACEMENT (N/A) 3 Days Post-Op   Precautions:   Fall    ASSESSMENT  Based on the objective data described below, the patient presents with decreased functional mobility from baseline level of function. Patient unable to provide information regarding baseline mobility. Overall limited by impaired balance, gait instability, generalized weakness, and decreased activity tolerance. Currently needing modA x 1 for supine to sit and maxA x 1 for partial stand. Unable to come to full stand and difficulty putting L arm on walker. Returned supine in bed with modA x 2 and scoots to [facility] with totalA x 2.   Patient is below her functional baseline and would benefit from HH PT with family support vs HH PT pending mobility progression and if patient's family can assist her at home. Other factors to consider for discharge: at risk for falls, below functional baseline, will need physical assistance at home     Patient will benefit from skilled therapy intervention to address the above noted impairments. PLAN :  Recommendations and Planned Interventions: bed mobility training, transfer training, gait training, therapeutic exercises, neuromuscular re-education, patient and family training/education, and therapeutic activities      Frequency/Duration: Patient will be followed by physical therapy:  3 times a week to address goals. Recommendation for discharge: (in order for the patient to meet his/her long term goals)  Physical therapy at least 2 days/week in the home AND ensure assist and/or supervision for safety with mobility vs SNF rehab based on family's ability to assist      IF patient discharges home will need the following DME: patient owns DME required for discharge         SUBJECTIVE:   Patient stated Justin Dawkins.     OBJECTIVE DATA SUMMARY:   HISTORY:    Past Medical History:   Diagnosis Date    Breast cancer ([facility]     Hypertension     Myocardial infarction ([facility]     [facility]     Stroke (cerebrum) ([facility] 2019   History reviewed. No pertinent surgical history.     Personal factors and/or comorbidities impacting plan of care:     Home Situation  Home Environment: Private residence  Support Systems: Family member(s)  Patient Expects to be Discharged to[de-identified] Private residence  Current DME Used/Available at Home: Walker, rolling    EXAMINATION/PRESENTATION/DECISION MAKING:   Critical Behavior:  Neurologic State: Alert  Orientation Level: Oriented to person, Oriented to place  Cognition: Follows commands       Range Of Motion:  AROM: Generally decreased, functional                       Strength:    Strength: Generally decreased, functional       Functional Mobility:  Bed Mobility:  Rolling: Moderate assistance;Assist x1  Supine to Sit: Moderate assistance;Assist x1  Sit to Supine: Moderate assistance;Assist x1  Scooting: Maximum assistance;Assist x2  Transfers:  Sit to Stand: Maximum assistance;Assist x1(unable to come to full stand)  Stand to Sit: Maximum assistance                       Balance:   Sitting: Impaired  Sitting - Static: Fair (occasional)  Sitting - Dynamic: Fair (occasional)  Standing: Impaired  Standing - Static: Constant support;Poor(unable to come to full stand)    Pain Rating:  No c/o pain    Activity Tolerance:   Fair and requires rest breaks    After treatment patient left in no apparent distress:   Supine in bed, Call bell within reach, and Side rails x 3    COMMUNICATION/EDUCATION:   The patients plan of care was discussed with: Physical therapist, Occupational therapist, and Registered nurse. Fall prevention education was provided and the patient/caregiver indicated understanding., Patient/family have participated as able in goal setting and plan of care. , and Patient/family agree to work toward stated goals and plan of care.     Thank you for this referral.  Jun Willis, PT, DPT   Time Calculation: 12 mins

## 2021-02-20 NOTE — PROGRESS NOTES
Bedside and Verbal shift change report given to Elif Watt RN (oncoming nurse) by Joshua Leon RN (offgoing nurse). Report included the following information SBAR, Kardex, ED Summary, Intake/Output, MAR and Recent Results.

## 2021-02-20 NOTE — PROGRESS NOTES
[address] [facility] Adult  Hospitalist Group                                                                                          Hospitalist Progress Note  Grazyna Parmar MD  Answering service: [phone] OR [phone] from in house phone        Date of Service:  2021  NAME:  Luh Baumann  :  9/3/1931  MRN:  [id number]      Admission Summary:   Luh Baumann is a 80 y.o. female who presents with abdominal pain, and jaundice. Interval history / Subjective:     Patient seen and examined. She complaints of abdominal pain. Received IV morphine 2 am,discussed with RN to provide analgesia. Assessment & Plan:     Pancreatic adeno carcinoma  Obstructive jaundice  Cancer related pain  - GI following, s/p ERCP and EUS  - she had a Bile duct stricture-stent placed  - -GI and onc following  -path report-adenocarcinoma  -OP Gi follow up for stent change, no aspirin for 7 days  -pain management- increased to 7.5 oxycodone and prn IV morphine for break through pain  -GI consult for possible celiac plexus on Monday  -[address] daughter chose hospice- consult    HTN - increased hydralazine 75 mg  and metoprolol. Pain causing elevated BP as well. D/c fluids  Hypokalemia - replete 40 meqX 1  Mild elevation of creatinine -resolved  H/o seizure disorder  - on keppra  Anemia - likely due to chronic disease due to malignancy. S monitor hgb, and transfuse if less than 7. Holding ASA for now. H/o breast cancer - reported in remission, was > 10 year ago per patient.        PT /Ot eval - per grand daughter she had stroke earlier this year and currently at home ,she was able to ambulate with walker with help        Code status: FULL(Her grand daughter said she is POA, will bring paperwork today and she would like to discuss with the patient about resuscitation today)          Care Plan discussed with: Patient/Family  Anticipated Disposition: Home w/Family  Anticipated Discharge: 3-4 days Hospital Problems  Date Reviewed: 2/17/2021          Codes Class Noted POA    Pancreatic mass ICD-10-CM: K86.89  ICD-9-CM: 577.8  2/16/2021 Unknown                Review of Systems:   A comprehensive review of systems was negative except for that written in the HPI. Vital Signs:    Last 24hrs VS reviewed since prior progress note. Most recent are:  Visit Vitals  BP (!) 195/78   Pulse (!) 106   Temp 98.1 °F (36.7 °C)   Resp 15   Ht 5' 5\" (1.651 m)   Wt 90.7 kg (200 lb)   SpO2 94%   BMI 33.28 kg/m²       No intake or output data in the 24 hours ending 02/20/21 1035     Physical Examination:     I had a face to face encounter with this patient and independently examined them on 2/20/2021 as outlined below:          Constitutional:   elderly patient    ENT:  Oral mucosa mosit, oropharynx benign. + scleral icterus, upper lip swelling improved   Resp:  CTA bilaterally. No wheezing/rhonchi/rales. No accessory muscle use   CV:  Regular rhythm, normal rate, no murmurs, S1, S2 wnl    GI:  Soft, non distended, + tender to palpation. bowel sounds +    Musculoskeletal:  No LE edema    Neurologic:  Moves all extremities. Alert and oriented ,hard of hearing     Psych:  Not anxious nor agitated. Data Review:    Review and/or order of clinical lab test  Review and/or order of tests in the medicine section of [facility]      Labs:     Recent Labs     02/20/21 0453 02/19/21  0438   WBC 11.9* 11.4*   HGB 7.3* 7.3*   HCT 24.6* 23.6*    354     Recent Labs     02/20/21 0453 02/19/21  0438 02/18/21  0500    142 142   K 3.4* 3.0* 3.6   * 112* 111*   CO2 21 24 22   BUN 16 18 13   CREA 0.94 1.22* 0.94   * 125* 103*   CA 8.7 8.5 8.9     Recent Labs     02/20/21 0453 02/19/21  0438 02/18/21  0500   ALT 63 85* 125*   * 283* 335*   TBILI 2.4* 2.4* 3.7*   TP 6.8 6.6 6.6   ALB 2.1* 2.0* 2.1*   GLOB 4.7* 4.6* 4.5*     No results for input(s): INR, PTP, APTT, INREXT, INREXT in the last 72 hours.    No results for input(s): FE, TIBC, PSAT, FERR in the last 72 hours. No results found for: FOL, RBCF   No results for input(s): PH, PCO2, PO2 in the last 72 hours.   Recent Labs     02/17/21  1430   TROIQ 0.07*     Lab Results   Component Value Date/Time    Cholesterol, total 190 12/03/2020 04:00 AM    HDL Cholesterol 59 12/03/2020 04:00 AM    LDL, calculated 107 (H) 12/03/2020 04:00 AM    Triglyceride 120 12/03/2020 04:00 AM    CHOL/HDL Ratio 3.2 12/03/2020 04:00 AM     Lab Results   Component Value Date/Time    Glucose (POC) 164 (H) 12/13/2020 07:29 PM    Glucose (POC) 190 (H) 12/13/2020 04:57 PM    Glucose (POC) 137 (H) 12/12/2020 09:19 PM    Glucose (POC) 204 (H) 12/12/2020 11:54 AM    Glucose (POC) 138 (H) 12/12/2020 07:54 AM     Lab Results   Component Value Date/Time    Color Yellow/Straw 01/29/2021 10:00 PM    Appearance Clear 01/29/2021 10:00 PM    Specific gravity 1.024 01/29/2021 10:00 PM    pH (UA) 5.0 01/29/2021 10:00 PM    Protein 30 (A) 01/29/2021 10:00 PM    Glucose 150 (A) 01/29/2021 10:00 PM    Ketone Negative 01/29/2021 10:00 PM    Bilirubin Negative 01/29/2021 10:00 PM    Urobilinogen 2.0 (H) 01/29/2021 10:00 PM    Nitrites Negative 01/29/2021 10:00 PM    Leukocyte Esterase Negative 01/29/2021 10:00 PM    Bacteria Negative 01/29/2021 10:00 PM    WBC 0-4 01/29/2021 10:00 PM    RBC 0-5 01/29/2021 10:00 PM         Medications Reviewed:     Current Facility-Administered Medications   Medication Dose Route Frequency    influenza vaccine 2020-21 (6 mos+)(PF) (FLUARIX/FLULAVAL/FLUZONE QUAD) injection 0.5 mL  0.5 mL IntraMUSCular PRIOR TO DISCHARGE    hydrALAZINE (APRESOLINE) tablet 75 mg  75 mg Oral TID    oxyCODONE IR (ROXICODONE) tablet 7.5 mg  7.5 mg Oral Q6H PRN    morphine injection 1 mg  1 mg IntraVENous Q6H PRN    sodium chloride (NS) flush 5-40 mL  5-40 mL IntraVENous Q8H    sodium chloride (NS) flush 5-40 mL  5-40 mL IntraVENous PRN    levETIRAcetam (KEPPRA) tablet 500 mg  500 mg Oral BID    meclizine (ANTIVERT) tablet 25 mg  25 mg Oral TID PRN    metoprolol tartrate (LOPRESSOR) tablet 100 mg  100 mg Oral BID    sodium chloride (NS) flush 5-40 mL  5-40 mL IntraVENous Q8H    sodium chloride (NS) flush 5-40 mL  5-40 mL IntraVENous PRN    ondansetron (ZOFRAN) injection 4 mg  4 mg IntraVENous Q4H PRN    pantoprazole (PROTONIX) 40 mg in 0.9% sodium chloride 10 mL injection  40 mg IntraVENous BID    hydrALAZINE (APRESOLINE) 20 mg/mL injection 10 mg  10 mg IntraVENous Q6H PRN     ______________________________________________________________________  EXPECTED LENGTH OF STAY: 3d 2h  ACTUAL LENGTH OF STAY:          4                 Efrain Gilbert MD

## 2021-02-20 NOTE — PROGRESS NOTES
GI PROGRESS NOTE    Patient Name: Sabino Cranker      : 9/3/1931      MRN: [id number]  Admit Date: 2021  Today's Date: 2021    Subjective:     'I do not have pain now.'   Asked by hospitalist to see her again for possible EUS guided celiac plexus neurolysis. Objective:     Blood pressure (!) 150/56, pulse 88, temperature 98.1 °F (36.7 °C), resp. rate 15, height 5' 5\" (1.651 m), weight 90.7 kg (200 lb), SpO2 94 %. Physical Exam:  Awake. In NAD      Data Review:    Recent Results (from the past 24 hour(s))   METABOLIC PANEL, COMPREHENSIVE    Collection Time: 21  4:53 AM   Result Value Ref Range    Sodium 143 136 - 145 mmol/L    Potassium 3.4 (L) 3.5 - 5.1 mmol/L    Chloride 114 (H) 97 - 108 mmol/L    CO2 21 21 - 32 mmol/L    Anion gap 8 5 - 15 mmol/L    Glucose 135 (H) 65 - 100 mg/dL    BUN 16 6 - 20 MG/DL    Creatinine 0.94 0.55 - 1.02 MG/DL    BUN/Creatinine ratio 17 12 - 20      GFR est AA >60 >60 ml/min/1.73m2    GFR est non-AA 56 (L) >60 ml/min/1.73m2    Calcium 8.7 8.5 - 10.1 MG/DL    Bilirubin, total 2.4 (H) 0.2 - 1.0 MG/DL    ALT (SGPT) 63 12 - 78 U/L    AST (SGOT) 48 (H) 15 - 37 U/L    Alk.  phosphatase 254 (H) 45 - 117 U/L    Protein, total 6.8 6.4 - 8.2 g/dL    Albumin 2.1 (L) 3.5 - 5.0 g/dL    Globulin 4.7 (H) 2.0 - 4.0 g/dL    A-G Ratio 0.4 (L) 1.1 - 2.2     CBC WITH AUTOMATED DIFF    Collection Time: 21  4:53 AM   Result Value Ref Range    WBC 11.9 (H) 3.6 - 11.0 K/uL    RBC 3.08 (L) 3.80 - 5.20 M/uL    HGB 7.3 (L) 11.5 - 16.0 g/dL    HCT 24.6 (L) 35.0 - 47.0 %    MCV 79.9 (L) 80.0 - 99.0 FL    MCH 23.7 (L) 26.0 - 34.0 PG    MCHC 29.7 (L) 30.0 - 36.5 g/dL    RDW 17.6 (H) 11.5 - 14.5 %    PLATELET 637 587 - 648 K/uL    MPV 10.8 8.9 - 12.9 FL    NRBC 0.0 0  WBC    ABSOLUTE NRBC 0.00 0.00 - 0.01 K/uL    NEUTROPHILS 80 (H) 32 - 75 %    LYMPHOCYTES 9 (L) 12 - 49 %    MONOCYTES 10 5 - 13 %    EOSINOPHILS 1 0 - 7 %    BASOPHILS 0 0 - 1 %    IMMATURE GRANULOCYTES 0 0.0 - 0.5 %    ABS. NEUTROPHILS 9.4 (H) 1.8 - 8.0 K/UL    ABS. LYMPHOCYTES 1.1 0.8 - 3.5 K/UL    ABS. MONOCYTES 1.2 (H) 0.0 - 1.0 K/UL    ABS. EOSINOPHILS 0.1 0.0 - 0.4 K/UL    ABS. BASOPHILS 0.0 0.0 - 0.1 K/UL    ABS. IMM. GRANS. 0.1 (H) 0.00 - 0.04 K/UL    DF AUTOMATED       - CT showed  a 4.7 x 3.8 cm mass in the pancreatic head causing obstruction of the CBD  - EUS 2/17 - A heterogeneous, poorly defined mass was noted in the pancreatic head on the superior aspect and was extending towards angela hepatis. Mass was seen infiltrating into the portal vein. Mass measured about 43 mm x 35 mm in size. Bile duct was obstructed in the intrapancreatic portion due to the mass. 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 The stricture was about 3 cm in length. There was no mucosal irregularity or filling defect noted. No luminal mass was noted. Bile duct proximal to the stricture was diffusely and tortuously dilated. Biliary sphincterotomy was performed using E RB E.  1 plastic biliary stent was placed in the common bile duct (10 Fr X 9 cm, [employer]). -   PATH Positive for pancreatic adenocarcinoma       Assessment / Plan :         GI had signed off but I am asked to see again for pain management with possible EUS-guided neurolysis. I spoke w/ the hospitalist and her RN. May eat today and tomorrow. Keep her NPO after Sunday MN please. We will see if Dr Inder Patten can do a non urgent EUS guided celiac neurolysis on Monday or not. I will see her on request only tomorrow.  Thank you           Patient Active Hospital Problem List:   Active Problems:    Pancreatic mass (2/16/2021)        Keisha López MD

## 2021-02-21 LAB
ALBUMIN SERPL-MCNC: 2.1 G/DL (ref 3.5–5)
ALBUMIN/GLOB SERPL: 0.4 {RATIO} (ref 1.1–2.2)
ALP SERPL-CCNC: 234 U/L (ref 45–117)
ALT SERPL-CCNC: 53 U/L (ref 12–78)
ANION GAP SERPL CALC-SCNC: 7 MMOL/L (ref 5–15)
AST SERPL-CCNC: 35 U/L (ref 15–37)
BASOPHILS # BLD: 0 K/UL (ref 0–0.1)
BASOPHILS NFR BLD: 0 % (ref 0–1)
BILIRUB SERPL-MCNC: 1.9 MG/DL (ref 0.2–1)
BUN SERPL-MCNC: 17 MG/DL (ref 6–20)
BUN/CREAT SERPL: 16 (ref 12–20)
CALCIUM SERPL-MCNC: 8.8 MG/DL (ref 8.5–10.1)
CHLORIDE SERPL-SCNC: 112 MMOL/L (ref 97–108)
CO2 SERPL-SCNC: 24 MMOL/L (ref 21–32)
CREAT SERPL-MCNC: 1.08 MG/DL (ref 0.55–1.02)
DIFFERENTIAL METHOD BLD: ABNORMAL
EOSINOPHIL # BLD: 0.2 K/UL (ref 0–0.4)
EOSINOPHIL NFR BLD: 1 % (ref 0–7)
ERYTHROCYTE [DISTWIDTH] IN BLOOD BY AUTOMATED COUNT: 17.8 % (ref 11.5–14.5)
GLOBULIN SER CALC-MCNC: 4.7 G/DL (ref 2–4)
GLUCOSE SERPL-MCNC: 164 MG/DL (ref 65–100)
HCT VFR BLD AUTO: 24.7 % (ref 35–47)
HGB BLD-MCNC: 7.4 G/DL (ref 11.5–16)
IMM GRANULOCYTES # BLD AUTO: 0.1 K/UL (ref 0–0.04)
IMM GRANULOCYTES NFR BLD AUTO: 1 % (ref 0–0.5)
LYMPHOCYTES # BLD: 1.2 K/UL (ref 0.8–3.5)
LYMPHOCYTES NFR BLD: 11 % (ref 12–49)
MCH RBC QN AUTO: 23.9 PG (ref 26–34)
MCHC RBC AUTO-ENTMCNC: 30 G/DL (ref 30–36.5)
MCV RBC AUTO: 79.7 FL (ref 80–99)
MONOCYTES # BLD: 1.3 K/UL (ref 0–1)
MONOCYTES NFR BLD: 11 % (ref 5–13)
NEUTS SEG # BLD: 9 K/UL (ref 1.8–8)
NEUTS SEG NFR BLD: 76 % (ref 32–75)
NRBC # BLD: 0 K/UL (ref 0–0.01)
NRBC BLD-RTO: 0 PER 100 WBC
PLATELET # BLD AUTO: 350 K/UL (ref 150–400)
PMV BLD AUTO: 10.5 FL (ref 8.9–12.9)
POTASSIUM SERPL-SCNC: 3.5 MMOL/L (ref 3.5–5.1)
PROT SERPL-MCNC: 6.8 G/DL (ref 6.4–8.2)
RBC # BLD AUTO: 3.1 M/UL (ref 3.8–5.2)
SODIUM SERPL-SCNC: 143 MMOL/L (ref 136–145)
WBC # BLD AUTO: 11.7 K/UL (ref 3.6–11)

## 2021-02-21 PROCEDURE — 74011000250 HC RX REV CODE- 250: Performed by: FAMILY MEDICINE

## 2021-02-21 PROCEDURE — C9113 INJ PANTOPRAZOLE SODIUM, VIA: HCPCS | Performed by: FAMILY MEDICINE

## 2021-02-21 PROCEDURE — 51798 US URINE CAPACITY MEASURE: CPT

## 2021-02-21 PROCEDURE — 65270000032 HC RM SEMIPRIVATE

## 2021-02-21 PROCEDURE — 74011250636 HC RX REV CODE- 250/636: Performed by: FAMILY MEDICINE

## 2021-02-21 PROCEDURE — 97535 SELF CARE MNGMENT TRAINING: CPT

## 2021-02-21 PROCEDURE — 74011250637 HC RX REV CODE- 250/637: Performed by: HOSPITALIST

## 2021-02-21 PROCEDURE — 80053 COMPREHEN METABOLIC PANEL: CPT

## 2021-02-21 PROCEDURE — 85025 COMPLETE CBC W/AUTO DIFF WBC: CPT

## 2021-02-21 PROCEDURE — 36415 COLL VENOUS BLD VENIPUNCTURE: CPT

## 2021-02-21 PROCEDURE — 97165 OT EVAL LOW COMPLEX 30 MIN: CPT

## 2021-02-21 PROCEDURE — 74011250637 HC RX REV CODE- 250/637: Performed by: FAMILY MEDICINE

## 2021-02-21 RX ADMIN — HYDRALAZINE HYDROCHLORIDE 75 MG: 50 TABLET, FILM COATED ORAL at 16:24

## 2021-02-21 RX ADMIN — OXYCODONE 7.5 MG: 5 TABLET ORAL at 18:15

## 2021-02-21 RX ADMIN — LEVETIRACETAM 500 MG: 500 TABLET ORAL at 18:23

## 2021-02-21 RX ADMIN — PANTOPRAZOLE SODIUM 40 MG: 40 INJECTION, POWDER, FOR SOLUTION INTRAVENOUS at 18:25

## 2021-02-21 RX ADMIN — MECLIZINE 25 MG: 12.5 TABLET ORAL at 18:38

## 2021-02-21 RX ADMIN — PANTOPRAZOLE SODIUM 40 MG: 40 INJECTION, POWDER, FOR SOLUTION INTRAVENOUS at 08:22

## 2021-02-21 RX ADMIN — OXYCODONE 7.5 MG: 5 TABLET ORAL at 08:37

## 2021-02-21 RX ADMIN — HYDRALAZINE HYDROCHLORIDE 75 MG: 50 TABLET, FILM COATED ORAL at 08:20

## 2021-02-21 RX ADMIN — Medication 10 ML: at 06:50

## 2021-02-21 RX ADMIN — LEVETIRACETAM 500 MG: 500 TABLET ORAL at 08:19

## 2021-02-21 RX ADMIN — METOPROLOL TARTRATE 100 MG: 50 TABLET, FILM COATED ORAL at 08:19

## 2021-02-21 RX ADMIN — Medication 10 ML: at 21:10

## 2021-02-21 RX ADMIN — METOPROLOL TARTRATE 100 MG: 50 TABLET, FILM COATED ORAL at 18:23

## 2021-02-21 RX ADMIN — HYDRALAZINE HYDROCHLORIDE 75 MG: 50 TABLET, FILM COATED ORAL at 21:10

## 2021-02-21 RX ADMIN — Medication 10 ML: at 14:46

## 2021-02-21 NOTE — PROGRESS NOTES
Problem: Self Care Deficits Care Plan (Adult)  Goal: *Acute Goals and Plan of Care (Insert Text)  Description:   FUNCTIONAL STATUS PRIOR TO ADMISSION: Pt poor historian; therefore, PLOF difficult to determine; however, chart indicates that pt resides alone with 24/7 paid caregiver assist, using RW for mobility/balance, with LUE hemiparesis and pt reporting moderate ADL assist.    HOME SUPPORT: The patient lived with paid caregiver to provide PRN ADL/IADL assist.    Occupational Therapy Goals  Initiated 2/21/2021  1. Patient will perform unsupported seated self-feeding with set-up within 7 day(s). 2.  Patient will perform unsupported seated grooming with set-up within 7 day(s). 3.  Patient will perform unsupported seated upper body dressing with set-up within 7 day(s). 4.  Patient will perform RW toilet transfers, EOB to [facility], with moderate assistance within 7 day(s). 5.  Patient will perform all aspects of toileting, using RW, with moderate assistance within 7 day(s). Outcome: Not Met    OCCUPATIONAL THERAPY EVALUATION  Patient: Dg Stauffer (52 y.o. female)  Date: 2/21/2021  Primary Diagnosis: Pancreatic mass [K86.89]  Procedure(s) (LRB):  ENDOSCOPIC RETROGRADE CHOLANGIOPANCREATOGRAPHY (ERCP) (N/A)  ENDOSCOPIC ULTRASOUND (EUS) (N/A)  ESOPHAGOGASTRODUODENOSCOPY (EGD) (N/A)  FINE NEEDLE BIOPSY (N/A)  ENDOSCOPIC SPHINCTEROTOMY (N/A)  BILIARY STENT PLACEMENT (N/A) 4 Days Post-Op   Precautions:  Fall    ASSESSMENT  Based on the objective data described below, the patient presents with decreased strength/endurance, decreased mobility/balance, decreased full body reaching, decreased activity tolerance, decreased problem solving/sequencing and extended time for processing, all of which limit pt's ability to safely complete self-care routine. Currently, pt benefits from [address] [place] [address] for inclined self-feeding/grooming/UB dressing, with Max A for bathing and Total A for LE dressing/toileting.    Pt poor historian; however, reports Moderate ADL assist at [facility] and OT noting pt to demonstrate decreased LUE functionality with decreased shoulder flexion and in-hand manipulation skills noted, with difficulty noted when semi-standing at . Pt benefits from skilled OT to address functional deficits in an overall attempt at maximizing pt's highest level of safe functional independence prior to discharge, with reporting therapist believing pt would benefit from [facility] services and ADL/IADL Supervision at discharge if caregiver able to provided recommended Max A-Total A for LE ADLs verses SNF placement. Current Level of Function Impacting Discharge (ADLs/self-care): Min A for inclined self-feeding/grooming/UB dressing, with Max A for bathing and Total A for LE dressing/toileting    Functional Outcome Measure: The patient scored 25/100 on the Barthel Index outcome measure. Other factors to consider for discharge: Max A-Total A for LE ADLs     Patient will benefit from skilled therapy intervention to address the above noted impairments. PLAN :  Recommendations and Planned Interventions: self care training, functional mobility training, therapeutic exercise, balance training, cognitive retraining, endurance activities, and patient education    Frequency/Duration: Patient will be followed by occupational therapy 3 times a week to address goals. Recommendation for discharge: (in order for the patient to meet his/her long term goals)  To be determined: HHOT services and ADL/IADL Supervision at discharge if caregiver able to provided recommended Max A-Total A for LE ADLs verses SNF placement.     This discharge recommendation:  Has not yet been discussed the attending provider and/or case management    IF patient discharges home will need the following DME: TBD       SUBJECTIVE:   Patient stated i'm at the hospital.    OBJECTIVE DATA SUMMARY:   HISTORY:   Past Medical History:   Diagnosis Date    [facility] [address]     Hypertension     [facility]ocardial [facility] ([facility] [address]     Seizure ([facility] [address] [facility]     [facility]rebrum[facility] 2019   History reviewed. No pertinent surgical history. Expanded or extensive additional review of patient history:     Home Situation  Home Environment: Private residence  Support Systems: Friends \ neighbors(24/7 caregiver)  Patient Expects to be Discharged to[de-identified] Private residence  Current DME Used/Available at Home: Commode, bedside, Raised toilet seat, Shower chair, Walker, rolling  Tub or Shower Type: Shower    Hand dominance: Right    EXAMINATION OF PERFORMANCE DEFICITS:  Cognitive/Behavioral Status:  Neurologic State: Alert;Drowsy  Orientation Level: Oriented to person;Oriented to place; Disoriented to situation;Disoriented to time  Cognition: Follows commands;Decreased attention/concentration     Perseveration: No perseveration noted  Safety/Judgement: Decreased awareness of environment;Decreased awareness of need for assistance;Decreased awareness of need for safety;Decreased insight into deficits    Vision/Perceptual:    Corrective Lenses: Glasses    Range of Motion:  AROM: Generally decreased, functional    Strength:  Strength: Generally decreased, functional    Coordination:  Fine Motor Skills-Upper: Left Impaired;Right Intact(decreased in-hand manipulation)    Gross Motor Skills-Upper: Left Impaired;Right Intact(decreased L shoulder flexion)    Tone & Sensation:  Decreased LUE sensation    Balance:  Sitting: Impaired  Sitting - Static: Fair (occasional)  Sitting - Dynamic: Fair (occasional)  Standing: Impaired  Standing - Static: Poor;Constant support(semi-stand at EOB)    Functional Mobility and Transfers for ADLs:  Bed Mobility:  Rolling:  Moderate assistance  Supine to Sit: Moderate assistance  Sit to Supine: Maximum assistance  Scooting: Maximum assistance    Transfers:  Sit to Stand: Maximum assistance(for semi-stand)  Stand to Sit: Maximum assistance    ADL Assessment:  Feeding: Minimum assistance    Oral Facial Hygiene/Grooming: Minimum assistance    Bathing: Maximum assistance    Upper Body Dressing: Minimum assistance    Lower Body Dressing: Total assistance    Toileting: Total assistance      ADL Intervention and task modifications:  Patient instructed and indicated understanding the benefits of maintaining activity tolerance, functional mobility, and independence with self care tasks during acute stay  to ensure safe return home and to baseline. Encouraged patient to increase frequency and duration OOB, be out of bed for all meals, perform daily ADLs (as approved by RN/MD regarding bathing etc), and performing functional mobility to/from [facility]. Pt educated on safe transfer techniques, with specific emphasis on proper hand placement to push up from seated surface rather than attempt to pull self up, fully positioning self in-front of desired seated location, feeling chair on back of legs and reaching back with 1-2 UE to slowly lower self to seated position. Cognitive Retraining  Safety/Judgement: Decreased awareness of environment;Decreased awareness of need for assistance;Decreased awareness of need for safety;Decreased insight into deficits    Functional Measure:  Barthel Index:    Bathin  Bladder: 5  Bowels: 5  Groomin  Dressin  Feedin  Mobility: 0  Stairs: 0  Toilet Use: 5  Transfer (Bed to Chair and Back): 5  Total: 25/100        The Barthel ADL Index: Guidelines  1. The index should be used as a record of what a patient does, not as a record of what a patient could do. 2. The main aim is to establish degree of independence from any help, physical or verbal, however minor and for whatever reason. 3. The need for supervision renders the patient not independent. 4. A patient's performance should be established using the best available evidence. Asking the patient, friends/relatives and nurses are the usual sources, but direct observation and common sense are also important.  However direct testing is not needed. 5. Usually the patient's performance over the preceding 24-48 hours is important, but occasionally longer periods will be relevant. 6. Middle categories imply that the patient supplies over 50 per cent of the effort. 7. Use of aids to be independent is allowed. Susan Mayer., Barthel, D.W. (1811). Functional evaluation: the Barthel Index. [address] [facility] (14)2. NAN Bass, Delfino Plascencia., Chloé López., [place], [facility] (1999). Measuring the change indisability after inpatient rehabilitation; comparison of the responsiveness of the Barthel Index and Functional Carter Measure. Journal of Neurology, Neurosurgery, and Psychiatry, 66(4), 216-244. MART Srivastava, MASHA Yi, & Nanci Johnson MMORGAN. (2004.) Assessment of post-stroke quality of life in cost-effectiveness studies: The usefulness of the Barthel Index and the EuroQoL-5D. Quality of Life Research, 15, 629-82     Occupational Therapy Evaluation Charge Determination   History Examination Decision-Making   LOW Complexity : Brief history review  HIGH Complexity : 5 or more performance deficits relating to physical, cognitive , or psychosocial skils that result in activity limitations and / or participation restrictions LOW Complexity : No comorbidities that affect functional and no verbal or physical assistance needed to complete eval tasks       Based on the above components, the patient evaluation is determined to be of the following complexity level: LOW   Pain Rating:  No c/o pain    Activity Tolerance:   Poor and requires frequent rest breaks    After treatment patient left in no apparent distress:    Supine in bed, Call bell within reach, Bed / chair alarm activated, and Side rails x 3    COMMUNICATION/EDUCATION:   The patients plan of care was discussed with: Registered nurse.      Home safety education was provided and the patient/caregiver indicated understanding., Patient/family have participated as able in goal setting and plan of care. , and Patient/family agree to work toward stated goals and plan of care. This patients plan of care is appropriate for delegation to [facility].     Thank you for this referral.  Angelica Parry, OT  Time Calculation: 21 mins

## 2021-02-21 NOTE — PROGRESS NOTES
Bedside and Verbal shift change report given to Jorden Boyd RN (oncoming nurse) by SOFÍA Hagen (offgoing nurse). Report included the following information SBAR, Kardex, ED Summary, Intake/Output, MAR and Recent Results.

## 2021-02-21 NOTE — PROGRESS NOTES
[facility] [facility] Adult  Hospitalist Group                                                                                          Hospitalist Progress Note  Yvonne Lozano MD  Answering service: [phone] OR [phone] from in house phone        Date of Service:  2021  NAME:  Steven Linder  :  9/3/1931  MRN:  [id number]      Admission Summary:   Steven Linder is a 80 y.o. female who presents with abdominal pain, and jaundice. Interval history / Subjective:     Patient seen and examined. She denied any pain today. Resting comfortably     Assessment & Plan:     Pancreatic adeno carcinoma  Obstructive jaundice  Cancer related pain  - GI following, s/p ERCP and EUS  - she had a Bile duct stricture-stent placed  - -GI and onc following  -path report-adenocarcinoma  -OP Gi follow up for stent change, no aspirin for 7 days  -pain management- continue 7.5 oxycodone and prn IV morphine for break through pain  -GI consult for evaluation of  celiac plexus block  -Grand daughter chose hospice- consult    HTN - continue  hydralazine 75 mg  and metoprolol. Hypokalemia - replete as needed. H/o seizure disorder  - on keppra    Anemia - likely due to chronic disease due to malignancy. Transfuse if less than 7. Holding ASA for now. H/o breast cancer - reported in remission, was > 10 year ago per patient. PT /Ot eval - per grand daughter she had stroke earlier this year and currently at home ,she was able to ambulate with walker with help        Code status: [facility] discussed with: Patient/Family  Anticipated Disposition: Home w/Family  Anticipated Discharge: 3-4 days     Hospital Problems  Date Reviewed: 2021          Codes Class Noted POA    Pancreatic mass ICD-10-CM: K86.89  ICD-9-CM: 577.8  2021 Unknown                Review of Systems:   A comprehensive review of systems was negative except for that written in the HPI.        Vital Signs: Last 24hrs VS reviewed since prior progress note. Most recent are:  Visit Vitals  BP (!) 167/79 (BP 1 Location: Right arm, BP Patient Position: At rest)   Pulse 87   Temp 98.5 °F (36.9 °C)   Resp 24   Ht 5' 5\" (1.651 m)   Wt 90.7 kg (200 lb)   SpO2 96%   BMI 33.28 kg/m²         Intake/Output Summary (Last 24 hours) at 2/21/2021 1436  Last data filed at 2/21/2021 6814  Gross per 24 hour   Intake 200 ml   Output 600 ml   Net -400 ml        Physical Examination:     I had a face to face encounter with this patient and independently examined them on 2/21/2021 as outlined below:          Constitutional:   elderly patient    ENT:  Oral mucosa mosit, oropharynx benign,anicteric sclera, upper lip swelling improved   Resp:  CTA bilaterally. No wheezing/rhonchi/rales. No accessory muscle use   CV:  Regular rhythm, normal rate, no murmurs, S1, S2 wnl    GI:  Soft, non distended, + tender to palpation. bowel sounds +    Musculoskeletal:  No LE edema    Neurologic:  Moves all extremities. Alert and oriented ,hard of hearing     Psych:  Not anxious nor agitated. Data Review:    Review and/or order of clinical lab test  Review and/or order of tests in the medicine section of CPT      Labs:     Recent Labs     02/21/21 0118 02/20/21 0453   WBC 11.7* 11.9*   HGB 7.4* 7.3*   HCT 24.7* 24.6*    348     Recent Labs     02/21/21 0118 02/20/21 0453 02/19/21  0438    143 142   K 3.5 3.4* 3.0*   * 114* 112*   CO2 24 21 24   BUN 17 16 18   CREA 1.08* 0.94 1.22*   * 135* 125*   CA 8.8 8.7 8.5     Recent Labs     02/21/21 0118 02/20/21 0453 02/19/21  0438   ALT 53 63 85*   * 254* 283*   TBILI 1.9* 2.4* 2.4*   TP 6.8 6.8 6.6   ALB 2.1* 2.1* 2.0*   GLOB 4.7* 4.7* 4.6*     No results for input(s): INR, PTP, APTT, INREXT, INREXT in the last 72 hours. No results for input(s): FE, TIBC, PSAT, FERR in the last 72 hours.    No results found for: FOL, RBCF   No results for input(s): PH, PCO2, PO2 in the last 72 hours. No results for input(s): CPK, CKNDX, TROIQ in the last 72 hours.     No lab exists for component: CPKMB  Lab Results   Component Value Date/Time    Cholesterol, total 190 12/03/2020 04:00 AM    HDL Cholesterol 59 12/03/2020 04:00 AM    LDL, calculated 107 (H) 12/03/2020 04:00 AM    Triglyceride 120 12/03/2020 04:00 AM    CHOL/HDL Ratio 3.2 12/03/2020 04:00 AM     Lab Results   Component Value Date/Time    Glucose (POC) 164 (H) 12/13/2020 07:29 PM    Glucose (POC) 190 (H) 12/13/2020 04:57 PM    Glucose (POC) 137 (H) 12/12/2020 09:19 PM    Glucose (POC) 204 (H) 12/12/2020 11:54 AM    Glucose (POC) 138 (H) 12/12/2020 07:54 AM     Lab Results   Component Value Date/Time    Color Yellow/Straw 01/29/2021 10:00 PM    Appearance Clear 01/29/2021 10:00 PM    Specific gravity 1.024 01/29/2021 10:00 PM    pH (UA) 5.0 01/29/2021 10:00 PM    Protein 30 (A) 01/29/2021 10:00 PM    Glucose 150 (A) 01/29/2021 10:00 PM    Ketone Negative 01/29/2021 10:00 PM    Bilirubin Negative 01/29/2021 10:00 PM    Urobilinogen 2.0 (H) 01/29/2021 10:00 PM    Nitrites Negative 01/29/2021 10:00 PM    Leukocyte Esterase Negative 01/29/2021 10:00 PM    Bacteria Negative 01/29/2021 10:00 PM    WBC 0-4 01/29/2021 10:00 PM    RBC 0-5 01/29/2021 10:00 PM         Medications Reviewed:     Current Facility-Administered Medications   Medication Dose Route Frequency    influenza vaccine 2020-21 (6 mos+)(PF) (FLUARIX/FLULAVAL/FLUZONE QUAD) injection 0.5 mL  0.5 mL IntraMUSCular PRIOR TO DISCHARGE    hydrALAZINE (APRESOLINE) tablet 75 mg  75 mg Oral TID    oxyCODONE IR (ROXICODONE) tablet 7.5 mg  7.5 mg Oral Q6H PRN    morphine injection 1 mg  1 mg IntraVENous Q6H PRN    sodium chloride (NS) flush 5-40 mL  5-40 mL IntraVENous Q8H    sodium chloride (NS) flush 5-40 mL  5-40 mL IntraVENous PRN    levETIRAcetam (KEPPRA) tablet 500 mg  500 mg Oral BID    meclizine (ANTIVERT) tablet 25 mg  25 mg Oral TID PRN    metoprolol tartrate (LOPRESSOR) tablet 100 mg  100 mg Oral BID    sodium chloride (NS) flush 5-40 mL  5-40 mL IntraVENous Q8H    sodium chloride (NS) flush 5-40 mL  5-40 mL IntraVENous PRN    ondansetron (ZOFRAN) injection 4 mg  4 mg IntraVENous Q4H PRN    pantoprazole (PROTONIX) 40 mg in 0.9% sodium chloride 10 mL injection  40 mg IntraVENous BID    hydrALAZINE (APRESOLINE) 20 mg/mL injection 10 mg  10 mg IntraVENous Q6H PRN     ______________________________________________________________________  EXPECTED LENGTH OF STAY: 3d 2h  ACTUAL LENGTH OF STAY:          5                 Geovanny Sheffield MD

## 2021-02-21 NOTE — ACP (ADVANCE CARE PLANNING)
[facility] Adult  Hospitalist Group                                      Advance Care Planning Note    Name: Derek Simmons  YOB: 1931  MRN: [id number]  Admission Date: 2/16/2021  1:56 PM    Date of discussion: 2/21/2021    Active Diagnoses:  Pancreatic cancer      These active diagnoses are of sufficient risk that focused discussion on advance care planning is indicated in order to allow the patient to thoughtfully consider personal goals of care, and if situations arise that prevent the ability to personally give input, to ensure appropriate representation of their personal desires for different levels and aggressiveness of care. Discussion:     Persons present and participating in discussion: Jayson Raymond MD, [place] [facility]    Topics Discussed:  Patient's medical condition and diagnosis: [ x ] yes [  ] no   Surrogate decision maker: [  x] yes [  ] no   Patient's current physical function/cognitive function/frailty: [ x ] yes [  ] no   Code Status: [x  ] yes [  ] no     Overview of Discussion:   Discussed with patient's granddaughter Reunion on phone and she was unable to come to the hospital.  I discussed about her current clinical course so far-pancreatic cancer. [facility] has spoke to the oncologist and her goal is to keep the patient comfortable. I discussed about hospice services and philosophy. I discussed what resuscitation means in case of cardiac/respiratory arrest-intubation, mechanical ventilation, chest compressions, shock etc.  Patient's granddaughter told me that she would like to discuss with her mother and will come to the hospital tomorrow with all the necessary paperwork and make final decision. Time Spent: 10 minutes.      Jayson Raymond MD  Date of Service:  2/21/2021  2:45 PM

## 2021-02-21 NOTE — PROGRESS NOTES
GI note:  Pt was not seen but I placed an order for NPO after MN to see if Dr Sherrie Howell can do her EUS celiac plexus neurolysis tomorrow or not.     SMA Hilda MD

## 2021-02-22 PROCEDURE — 99233 SBSQ HOSP IP/OBS HIGH 50: CPT | Performed by: NURSE PRACTITIONER

## 2021-02-22 PROCEDURE — 74011000250 HC RX REV CODE- 250: Performed by: FAMILY MEDICINE

## 2021-02-22 PROCEDURE — 74011250636 HC RX REV CODE- 250/636: Performed by: FAMILY MEDICINE

## 2021-02-22 PROCEDURE — C9113 INJ PANTOPRAZOLE SODIUM, VIA: HCPCS | Performed by: FAMILY MEDICINE

## 2021-02-22 PROCEDURE — 74011250637 HC RX REV CODE- 250/637: Performed by: HOSPITALIST

## 2021-02-22 PROCEDURE — 74011250637 HC RX REV CODE- 250/637: Performed by: FAMILY MEDICINE

## 2021-02-22 PROCEDURE — 65270000032 HC RM SEMIPRIVATE

## 2021-02-22 PROCEDURE — 74011250637 HC RX REV CODE- 250/637: Performed by: NURSE PRACTITIONER

## 2021-02-22 RX ORDER — POLYETHYLENE GLYCOL 3350 17 G/17G
17 POWDER, FOR SOLUTION ORAL DAILY
Status: DISCONTINUED | OUTPATIENT
Start: 2021-02-22 | End: 2021-02-23 | Stop reason: HOSPADM

## 2021-02-22 RX ORDER — AMOXICILLIN 250 MG
1 CAPSULE ORAL
Status: DISCONTINUED | OUTPATIENT
Start: 2021-02-22 | End: 2021-02-23 | Stop reason: HOSPADM

## 2021-02-22 RX ADMIN — OXYCODONE 7.5 MG: 5 TABLET ORAL at 18:38

## 2021-02-22 RX ADMIN — POLYETHYLENE GLYCOL 3350 17 G: 17 POWDER, FOR SOLUTION ORAL at 18:41

## 2021-02-22 RX ADMIN — HYDRALAZINE HYDROCHLORIDE 75 MG: 50 TABLET, FILM COATED ORAL at 15:55

## 2021-02-22 RX ADMIN — HYDRALAZINE HYDROCHLORIDE 75 MG: 50 TABLET, FILM COATED ORAL at 09:18

## 2021-02-22 RX ADMIN — METOPROLOL TARTRATE 100 MG: 50 TABLET, FILM COATED ORAL at 09:19

## 2021-02-22 RX ADMIN — Medication 10 ML: at 21:31

## 2021-02-22 RX ADMIN — OXYCODONE 7.5 MG: 5 TABLET ORAL at 09:22

## 2021-02-22 RX ADMIN — PANTOPRAZOLE SODIUM 40 MG: 40 INJECTION, POWDER, FOR SOLUTION INTRAVENOUS at 18:42

## 2021-02-22 RX ADMIN — DOCUSATE SODIUM 50 MG AND SENNOSIDES 8.6 MG 1 TABLET: 8.6; 5 TABLET, FILM COATED ORAL at 21:27

## 2021-02-22 RX ADMIN — Medication 10 ML: at 07:10

## 2021-02-22 RX ADMIN — LEVETIRACETAM 500 MG: 500 TABLET ORAL at 18:41

## 2021-02-22 RX ADMIN — Medication 10 ML: at 14:12

## 2021-02-22 RX ADMIN — LACTULOSE 30 ML: 20 SOLUTION ORAL at 11:17

## 2021-02-22 RX ADMIN — LEVETIRACETAM 500 MG: 500 TABLET ORAL at 09:19

## 2021-02-22 RX ADMIN — HYDRALAZINE HYDROCHLORIDE 75 MG: 50 TABLET, FILM COATED ORAL at 21:27

## 2021-02-22 RX ADMIN — METOPROLOL TARTRATE 100 MG: 50 TABLET, FILM COATED ORAL at 18:35

## 2021-02-22 RX ADMIN — PANTOPRAZOLE SODIUM 40 MG: 40 INJECTION, POWDER, FOR SOLUTION INTRAVENOUS at 09:19

## 2021-02-22 NOTE — PROGRESS NOTES
Transitions of Care plan: TBD    -RUR: 18%  -CM noted hospice consult written on 2/20/21 and no referral sent. CM sent a referral to [facility], as patient is already open to [address] [place] [address]. 11:20am: Call received from April with [facility]. She is going to reach out to patient's granddaughter, Kirti, to discuss hospice and determine what equipment is needed. Will follow. 2:00pm: [facility] is having a hospital bed delivered this evening at 7 pm to patient's home. Patient can be discharged tomorrow morning.      Jannet PORTILLO, ACM-SW

## 2021-02-22 NOTE — PROGRESS NOTES
[facility] [facility]  Hospitalist Group                                                                                          Hospitalist Progress Note  Panchito Amaya MD  Answering service: [phone] OR [phone] from in house phone        Date of Service:  2021  NAME:  Bebe Murray  :  9/3/1931  MRN:  [id number]      Admission Summary:   Bebe Murray is a 80 y.o. female who presents with abdominal pain, and jaundice. Interval history / Subjective:     Patient seen and examined. She denied any abdominal pain today. Family agreed for DNR and hospice,  placed co nsult for edna hospice     Assessment & Plan:     Pancreatic adeno carcinoma  Obstructive jaundice  Cancer related pain  - GI following, s/p ERCP and EUS  - she had a Bile duct stricture-stent placed  - -GI and onc following  -path report-adenocarcinoma  -OP Gi follow up for stent change, no aspirin for 7 days  -pain controlled with 7.5 oxycodone , d/c IV morphine     HTN - continue  hydralazine 75 mg  and metoprolol. Hypokalemia - replete as needed. H/o seizure disorder  - on keppra    Anemia - likely due to chronic disease due to malignancy. Transfuse if less than 7. Holding ASA for now. H/o breast cancer - reported in remission, was > 10 year ago per patient. Code status:dnr        Care Plan discussed with: Patient/Family  Anticipated Disposition:home hospice  Anticipated [address] Problems  Date Reviewed: 2021          Codes Class Noted POA    Pancreatic mass ICD-10-CM: K86.89  ICD-9-CM: 577.8  2021 Unknown                Review of Systems:   As per HPI      Vital Signs:    Last 24hrs VS reviewed since prior progress note.  Most recent are:  Visit Vitals  BP (!) 150/75 (BP 1 Location: Right upper arm, BP Patient Position: At rest)   Pulse 93   Temp 98.9 °F (37.2 °C)   Resp 16   Ht 5' 5\" (1.651 m)   Wt 90.7 kg (200 lb)   SpO2 95%   BMI 33.28 kg/m² Intake/Output Summary (Last 24 hours) at 2/22/2021 1830  Last data filed at 2/22/2021 0536  Gross per 24 hour   Intake    Output 200 ml   Net -200 ml        Physical Examination:     I had a face to face encounter with this patient and independently examined them on 2/22/2021 as outlined below:          Constitutional:   elderly patient    ENT:  Oral mucosa mosit, oropharynx benign,anicteric sclera, upper lip swelling improved   Resp:  CTA bilaterally. No wheezing/rhonchi/rales. No accessory muscle use   CV:  Regular rhythm, normal rate, no murmurs, S1, S2 wnl    GI:  Soft, non distended, + tender to palpation. bowel sounds +    Musculoskeletal:  No LE edema    Neurologic:  Moves all extremities. Alert and oriented ,hard of hearing     Psych:  Not anxious nor agitated. Data Review:    Review and/or order of clinical lab test  Review and/or order of tests in the medicine section of [facility]      Labs:     Recent Labs     02/21/21  0118 02/20/21  0453   WBC 11.7* 11.9*   HGB 7.4* 7.3*   HCT 24.7* 24.6*    348     Recent Labs     02/21/21  0118 02/20/21  0453    143   K 3.5 3.4*   * 114*   CO2 24 21   BUN 17 16   CREA 1.08* 0.94   * 135*   CA 8.8 8.7     Recent Labs     02/21/21  0118 02/20/21  0453   ALT 53 63   * 254*   TBILI 1.9* 2.4*   TP 6.8 6.8   ALB 2.1* 2.1*   GLOB 4.7* 4.7*     No results for input(s): INR, PTP, APTT, INREXT, INREXT in the last 72 hours. No results for input(s): FE, TIBC, PSAT, FERR in the last 72 hours. No results found for: FOL, RBCF   No results for input(s): PH, PCO2, PO2 in the last 72 hours. No results for input(s): CPK, CKNDX, TROIQ in the last 72 hours.     No lab exists for component: CPKMB  Lab Results   Component Value Date/Time    Cholesterol, total 190 12/03/2020 04:00 AM    HDL Cholesterol 59 12/03/2020 04:00 AM    LDL, calculated 107 (H) 12/03/2020 04:00 AM    Triglyceride 120 12/03/2020 04:00 AM    CHOL/HDL Ratio 3.2 12/03/2020 04:00 AM     Lab Results   Component Value Date/Time    Glucose (POC) 164 (H) 12/13/2020 07:29 PM    Glucose (POC) 190 (H) 12/13/2020 04:57 PM    Glucose (POC) 137 (H) 12/12/2020 09:19 PM    Glucose (POC) 204 (H) 12/12/2020 11:54 AM    Glucose (POC) 138 (H) 12/12/2020 07:54 AM     Lab Results   Component Value Date/Time    Color Yellow/Straw 01/29/2021 10:00 PM    Appearance Clear 01/29/2021 10:00 PM    Specific gravity 1.024 01/29/2021 10:00 PM    pH (UA) 5.0 01/29/2021 10:00 PM    Protein 30 (A) 01/29/2021 10:00 PM    Glucose 150 (A) 01/29/2021 10:00 PM    Ketone Negative 01/29/2021 10:00 PM    Bilirubin Negative 01/29/2021 10:00 PM    Urobilinogen 2.0 (H) 01/29/2021 10:00 PM    Nitrites Negative 01/29/2021 10:00 PM    Leukocyte Esterase Negative 01/29/2021 10:00 PM    Bacteria Negative 01/29/2021 10:00 PM    WBC 0-4 01/29/2021 10:00 PM    RBC 0-5 01/29/2021 10:00 PM         Medications Reviewed:     Current Facility-Administered Medications   Medication Dose Route Frequency    senna-docusate (PERICOLACE) 8.6-50 mg per tablet 1 Tab  1 Tab Oral QHS    influenza vaccine 2020-21 (6 mos+)(PF) (FLUARIX/FLULAVAL/FLUZONE QUAD) injection 0.5 mL  0.5 mL IntraMUSCular PRIOR TO DISCHARGE    hydrALAZINE (APRESOLINE) tablet 75 mg  75 mg Oral TID    oxyCODONE IR (ROXICODONE) tablet 7.5 mg  7.5 mg Oral Q6H PRN    morphine injection 1 mg  1 mg IntraVENous Q6H PRN    sodium chloride (NS) flush 5-40 mL  5-40 mL IntraVENous Q8H    sodium chloride (NS) flush 5-40 mL  5-40 mL IntraVENous PRN    levETIRAcetam (KEPPRA) tablet 500 mg  500 mg Oral BID    meclizine (ANTIVERT) tablet 25 mg  25 mg Oral TID PRN    metoprolol tartrate (LOPRESSOR) tablet 100 mg  100 mg Oral BID    sodium chloride (NS) flush 5-40 mL  5-40 mL IntraVENous Q8H    sodium chloride (NS) flush 5-40 mL  5-40 mL IntraVENous PRN    ondansetron (ZOFRAN) injection 4 mg  4 mg IntraVENous Q4H PRN    pantoprazole (PROTONIX) 40 mg in 0.9% sodium chloride 10 mL injection 40 mg IntraVENous BID    hydrALAZINE (APRESOLINE) 20 mg/mL injection 10 mg  10 mg IntraVENous Q6H PRN     ______________________________________________________________________  EXPECTED LENGTH OF STAY: 3d 9h  ACTUAL LENGTH OF STAY:          [id number] MD Sanjuanita

## 2021-02-22 NOTE — PROGRESS NOTES
Palliative Medicine Consult  Joe: [phone] ([phone])    Patient Name: Dg Stauffer  YOB: 1931    Date of Initial Consult: February 19, 2021  Reason for Consult: Care Decisions   Requesting Provider:  Dr. Lakesha Tomlin   Primary Care Physician: Kerry Ruelas MD     SUMMARY:   Dg Stauffer is a 80 y.o. with a past history of HTN, breast cancer, seizure disorder, MI, stroke, who was admitted on 2/16/2021 from home with a diagnosis of nausea, vomiting, anorexia, abdominal pain 2/2 pancreatic mass, obstructive jaundice, gall stones, hypokalemia, anemia     Current medical issues leading to Palliative Medicine involvement include: pt with advanced disease and poor prognosis. We have been asked to support with goals of care. Full code  Social: lives at home with 24 hour care giver     PALLIATIVE DIAGNOSES:   1. Drug induced constipation   2. DNR discussion  3. Metastatic disease  4. Hypoalbuminemia  5. Cancer related pain  6. End of life care     PLAN:   1. Pt seen. She is alert today and verbal, asking for her granddaughter. Facetime video call completed and pt really \"perked up\". Spoke with Granddaughter, Mahendra Dixon who is the MPOA (documents pending) she will be coming to the hospital later this afternoon with the documents and to address all affairs. 2. Code status discussed and the family agrees to DNR. DDNR will be signed when she arrives  3. Plan for hospice affirmed. Consult placed to [facility]  4. Pain controlled well with oxycodone. Discussed with grdtr cancelling celiac block and continuing current management and she agrees. Attending will cancel the order. 5. No bm in 4 days. One dose of lactulose ordered followed by pericolace daily  6. Will follow up    7. Initial consult note routed to primary continuity provider and/or primary health care team members  8.  Communicated plan of care with: Palliative [facility] [address] Team     GOALS OF CARE / TREATMENT PREFERENCES:     GOALS OF CARE:  Patient/Health Care Proxy Stated Goals: Comfort    TREATMENT PREFERENCES:   Code Status: DNR    Advance Care Planning:  [x] The [facility] Interdisciplinary Team has updated the ACP Navigator with Health Care Decision Maker and Patient Capacity      Primary Decision Maker (Active): lalo wyatt - [place] - [phone]      No flowsheet data found. Medical Interventions: Comfort measures     Other Instructions:   Artificially Administered Nutrition: No feeding tube     Other:    As far as possible, the palliative care team has discussed with patient / health care proxy about goals of care / treatment preferences for patient. HISTORY:     History obtained from:  Chart, team, family    CHIEF COMPLAINT: pt admitted with aforementioned history and issues    HPI/SUBJECTIVE:    The patient is:   [x] Verbal and participatory  [] Non-participatory due to:    No pain, decreased appetite, malaise      Clinical Pain Assessment (nonverbal scale for severity on nonverbal patients):   Clinical Pain Assessment  Severity: 0     Activity (Movement): Seeking attention through movement or slow, cautious movement    Duration: for how long has pt been experiencing pain (e.g., 2 days, 1 month, years)  Frequency: how often pain is an issue (e.g., several times per day, once every few days, constant)     FUNCTIONAL ASSESSMENT:     Palliative Performance Scale (PPS):  PPS: 40       PSYCHOSOCIAL/SPIRITUAL SCREENING:     Palliative IDT has assessed this patient for cultural preferences / practices and a referral made as appropriate to needs (Cultural Services, Patient Advocacy, Ethics, etc.)    Any spiritual / Mandaeism concerns:  [] Yes /  [x] No    Caregiver Burnout:  [] Yes /  [x] No /  [] No Caregiver Present      Anticipatory grief assessment:   [x] Normal  / [] Maladaptive       ESAS Anxiety: Anxiety: 0    ESAS Depression:          REVIEW OF SYSTEMS:     Positive and pertinent negative findings in ROS are noted above in HPI. The following systems were [x] reviewed objectively / [] unable to be reviewed as noted in HPI  Other findings are noted below. Systems: constitutional, ears/nose/mouth/throat, respiratory, gastrointestinal, genitourinary, musculoskeletal, integumentary, neurologic, psychiatric, endocrine. Positive findings noted below. Modified ESAS Completed by: provider   Fatigue: 7 Drowsiness: 0     Pain: 0   Anxiety: 0 Nausea: 0   Anorexia: 5 Dyspnea: 0     Constipation: Yes     Stool Occurrence(s): 1        PHYSICAL EXAM:     From RN flowsheet:  Wt Readings from Last 3 Encounters:   02/16/21 200 lb (90.7 kg)   01/29/21 200 lb (90.7 kg)   12/02/20 250 lb (113.4 kg)     Blood pressure (!) 177/87, pulse 94, temperature 97.6 °F (36.4 °C), resp. rate 18, height 5' 5\" (1.651 m), weight 200 lb (90.7 kg), SpO2 94 %. Pain Scale 1: Adult Nonverbal Pain Scale  Pain Intensity 1: 0  Pain Onset 1: ongoing  Pain Location 1: Abdomen  Pain Orientation 1: Mid  Pain Description 1: Aching  Pain Intervention(s) 1: Medication (see MAR)  Last bowel movement, if known:     Constitutional: alert, verbal, nad  Eyes: closed  ENMT: no nasal discharge, dry mucous membranes  Cardiovascular:   Respiratory: breathing not labored, symmetric  Gastrointestinal: firm  Musculoskeletal: no deformity, no tenderness to palpation  Skin: warm, dry  Neurologic: alert, confusion  Psychiatric: calm  Other:       HISTORY:     Active Problems:    Pancreatic mass (2/16/2021)      Past Medical History:   Diagnosis Date    [facility] ([facility]     Hypertension     Myocardial infarction ([facility]     Seizure ([facility]     [facility]      History reviewed. No pertinent surgical history. History reviewed. No pertinent family history. History reviewed, no pertinent family history.   Social History     Tobacco Use    Smoking status: Never Smoker    Smokeless tobacco: Never Used   Substance Use Topics    Alcohol use: Never Frequency: Never     No Known Allergies   Current Facility-Administered Medications   Medication Dose Route Frequency    lactulose (CHRONULAC) 10 gram/15 mL solution 30 mL  20 g Oral ONCE    senna-docusate (PERICOLACE) 8.6-50 mg per tablet 1 Tab  1 Tab Oral QHS    influenza vaccine 2020-21 (6 mos+)(PF) (FLUARIX/FLULAVAL/FLUZONE QUAD) injection 0.5 mL  0.5 mL IntraMUSCular PRIOR TO DISCHARGE    hydrALAZINE (APRESOLINE) tablet 75 mg  75 mg Oral TID    oxyCODONE IR (ROXICODONE) tablet 7.5 mg  7.5 mg Oral Q6H PRN    morphine injection 1 mg  1 mg IntraVENous Q6H PRN    sodium chloride (NS) flush 5-40 mL  5-40 mL IntraVENous Q8H    sodium chloride (NS) flush 5-40 mL  5-40 mL IntraVENous PRN    levETIRAcetam (KEPPRA) tablet 500 mg  500 mg Oral BID    meclizine (ANTIVERT) tablet 25 mg  25 mg Oral TID PRN    metoprolol tartrate (LOPRESSOR) tablet 100 mg  100 mg Oral BID    sodium chloride (NS) flush 5-40 mL  5-40 mL IntraVENous Q8H    sodium chloride (NS) flush 5-40 mL  5-40 mL IntraVENous PRN    ondansetron (ZOFRAN) injection 4 mg  4 mg IntraVENous Q4H PRN    pantoprazole (PROTONIX) 40 mg in 0.9% sodium chloride 10 mL injection  40 mg IntraVENous BID    hydrALAZINE (APRESOLINE) 20 mg/mL injection 10 mg  10 mg IntraVENous Q6H PRN          LAB AND IMAGING FINDINGS:     Lab Results   Component Value Date/Time    WBC 11.7 (H) 02/21/2021 01:18 AM    HGB 7.4 (L) 02/21/2021 01:18 AM    PLATELET 185 45/75/0446 01:18 AM     Lab Results   Component Value Date/Time    Sodium 143 02/21/2021 01:18 AM    Potassium 3.5 02/21/2021 01:18 AM    Chloride 112 (H) 02/21/2021 01:18 AM    CO2 24 02/21/2021 01:18 AM    BUN 17 02/21/2021 01:18 AM    Creatinine 1.08 (H) 02/21/2021 01:18 AM    Calcium 8.8 02/21/2021 01:18 AM      Lab Results   Component Value Date/Time    Alk.  phosphatase 234 (H) 02/21/2021 01:18 AM    Protein, total 6.8 02/21/2021 01:18 AM    Albumin 2.1 (L) 02/21/2021 01:18 AM    Globulin 4.7 (H) 02/21/2021 01:18 AM Lab Results   Component Value Date/Time    INR 1.0 12/02/2020 11:30 AM    Prothrombin time 13.7 12/02/2020 11:30 AM      No results found for: IRON, FE, TIBC, IBCT, PSAT, FERR   No results found for: PH, PCO2, PO2  No components found for: GLPOC   No results found for: CPK, CKMB             Total time:35 minutes  Counseling / coordination time, spent as noted above: 30 minutes  > 50% counseling / coordination?: y    Prolonged service was provided for  []30 min   []75 min in face to face time in the presence of the patient, spent as noted above. Time Start:   Time End:   Note: this can only be billed with 06237 (initial) or 59736 (follow up). If multiple start / stop times, list each separately.

## 2021-02-22 NOTE — PROGRESS NOTES
Bedside and Verbal shift change report given to Caterina Nixon RN (oncoming nurse) by Bren Ross RN (offgoing nurse). Report included the following information SBAR, Kardex, ED Summary, Intake/Output, MAR and Recent Results.

## 2021-02-22 NOTE — PROGRESS NOTES
Patient is discharging home with hospice in the AM. No further PT needs identified. Please re-consult should needs change. Thanks!     Josephine Lal PT, DPT  Geriatric Clinical Specialist

## 2021-02-22 NOTE — PROGRESS NOTES
GI PROGRESS NOTE    Patient Name: Giana Chopra      : 9/3/1931      MRN: [id number]  Admit Date: 2021  Today's Date: 2021    Subjective:     Patient denies abdominal pain currently. She is not having difficulty eating, but states she isn't hungry. No family is at bedside. We had been called over the weekend to consider EUS with celiac plexus block, but the hospitalist today states patient's pain is controlled, and oncologist Dr. Fariha Santiago said the family is seeking comfort care for her. From review of the chart, I see that the decision has been made for hospice. Objective:     Blood pressure (!) 177/87, pulse 94, temperature 97.6 °F (36.4 °C), resp. rate 18, height 5' 5\" (1.651 m), weight 90.7 kg (200 lb), SpO2 94 %. Physical Exam:  Awake. In NAD      Data Review:    No results found for this or any previous visit (from the past 24 hour(s)). - CT showed  a 4.7 x 3.8 cm mass in the pancreatic head causing obstruction of the CBD  - EUS  - A heterogeneous, poorly defined mass was noted in the pancreatic head on the superior aspect and was extending towards angela hepatis. Mass was seen infiltrating into the portal vein. Mass measured about 43 mm x 35 mm in size. Bile duct was obstructed in the intrapancreatic portion due to the mass. - ERCP  - A tight stricture was noted in the distal common bile duct in the intrapancreatic portion. The stricture was about 3 cm in length. There was no mucosal irregularity or filling defect noted. No luminal mass was noted. Bile duct proximal to the stricture was diffusely and tortuously dilated. Biliary sphincterotomy was performed using E RB E.  1 plastic biliary stent was placed in the common bile duct (10 Fr X 9 cm, [employer]).     -   PATH Positive for pancreatic adenocarcinoma       Assessment / Plan :         89-yo with 4 months of persistent epigastric pain, intermittent N/V and a weight loss of 50 lbs over the past 3 months with new onset scleral icterus, prompting an ER visit yesterday.      Pancreatic cancer    - Review of the chart / palliative notes / care management notes - the decision has been made for Hospice care. We will not plan on EUS celiac plexus block at this time. Signing off.                           I have examined the patient. I have reviewed the chart and agree with the documentation recorded by the KANU, including the assessment, treatment plan, and disposition.       Brittany Ugalde MD

## 2021-02-23 VITALS
DIASTOLIC BLOOD PRESSURE: 64 MMHG | HEART RATE: 88 BPM | WEIGHT: 200 LBS | BODY MASS INDEX: 33.32 KG/M2 | RESPIRATION RATE: 16 BRPM | TEMPERATURE: 98.8 F | SYSTOLIC BLOOD PRESSURE: 123 MMHG | HEIGHT: 65 IN | OXYGEN SATURATION: 95 %

## 2021-02-23 PROCEDURE — 77030038269 HC DRN EXT URIN PURWCK BARD -A

## 2021-02-23 PROCEDURE — 74011250637 HC RX REV CODE- 250/637: Performed by: FAMILY MEDICINE

## 2021-02-23 PROCEDURE — 74011250637 HC RX REV CODE- 250/637: Performed by: HOSPITALIST

## 2021-02-23 PROCEDURE — 74011000250 HC RX REV CODE- 250: Performed by: FAMILY MEDICINE

## 2021-02-23 PROCEDURE — C9113 INJ PANTOPRAZOLE SODIUM, VIA: HCPCS | Performed by: FAMILY MEDICINE

## 2021-02-23 PROCEDURE — 74011250636 HC RX REV CODE- 250/636: Performed by: FAMILY MEDICINE

## 2021-02-23 RX ORDER — HYDRALAZINE HYDROCHLORIDE 50 MG/1
75 TABLET, FILM COATED ORAL 3 TIMES DAILY
Qty: 60 TAB | Refills: 0 | Status: SHIPPED | OUTPATIENT
Start: 2021-02-23

## 2021-02-23 RX ORDER — LEVETIRACETAM 500 MG/1
500 TABLET ORAL 2 TIMES DAILY
Qty: 60 TAB | Refills: 0 | Status: SHIPPED | OUTPATIENT
Start: 2021-02-23

## 2021-02-23 RX ORDER — ACETAMINOPHEN 325 MG/1
650 TABLET ORAL
Qty: 30 TAB | Refills: 0 | Status: SHIPPED
Start: 2021-02-23

## 2021-02-23 RX ADMIN — HYDRALAZINE HYDROCHLORIDE 75 MG: 50 TABLET, FILM COATED ORAL at 08:37

## 2021-02-23 RX ADMIN — METOPROLOL TARTRATE 100 MG: 50 TABLET, FILM COATED ORAL at 08:37

## 2021-02-23 RX ADMIN — POLYETHYLENE GLYCOL 3350 17 G: 17 POWDER, FOR SOLUTION ORAL at 08:37

## 2021-02-23 RX ADMIN — LEVETIRACETAM 500 MG: 500 TABLET ORAL at 08:37

## 2021-02-23 RX ADMIN — Medication 10 ML: at 06:00

## 2021-02-23 RX ADMIN — OXYCODONE 7.5 MG: 5 TABLET ORAL at 06:00

## 2021-02-23 RX ADMIN — HYDRALAZINE HYDROCHLORIDE 10 MG: 20 INJECTION INTRAMUSCULAR; INTRAVENOUS at 08:46

## 2021-02-23 RX ADMIN — PANTOPRAZOLE SODIUM 40 MG: 40 INJECTION, POWDER, FOR SOLUTION INTRAVENOUS at 08:37

## 2021-02-23 NOTE — ROUTINE PROCESS
Bedside and Verbal shift change report given to Betzaida Kelley (oncoming nurse) by Caterina Nixon (offgoing nurse). Report included the following information SBAR, Kardex, MAR and Recent Results. I have read and agree with the care and charting of Leo Kumar RN.

## 2021-02-23 NOTE — PROGRESS NOTES
Transitions of Care plan: Home today with hospice    -RUR: 19%  -Patient will be discharged home today with hospice through [facility]. -STEPAN spoke with patient's granddaughter Kirti to make arrangements. -[facility] transport to  patient around 11am.      10:10am: Call received from patient's granddaughter stating [facility] did not deliver the hospital bed, only oxygen. STEPAN moved transport to 2 pm and called the hospice RN April. She did not answer and her voicemail was full. STEPAN called the [facility] main office ([phone])  And discussed the issue with them.  The bed will be delivered this afternoon and STEPAN has changed the transport time to 4 pm.   MELBA PORTILLO, ACM-SW

## 2021-02-23 NOTE — ROUTINE PROCESS
Patient discharged with [facility] at this time. Patient being discharged with home hospice. Discharge paperwork, prescriptions, and [facility] paperwork were sent with patient upon discharge. Patient left with belongings and IVs were taken out.

## 2021-02-23 NOTE — DISCHARGE SUMMARY
Discharge Summary       PATIENT ID: Bebe Murray  MRN: [id number]   YOB: 1931    DATE OF ADMISSION: 2/16/2021  1:56 PM    DATE OF DISCHARGE: 2/23/22021   PRIMARY CARE PROVIDER: Brian Sultana MD     ATTENDING PHYSICIAN: Tunde Walls MD    DISCHARGING PROVIDER: Panchito Amaya MD    To contact this individual call [phone] and ask the  to page. If unavailable ask to be transferred the Adult Hospitalist Department. CONSULTATIONS: ED CONSULT TO SENIOR SERVICES NURSE PRACTITIONER  IP CONSULT TO ONCOLOGY  IP CONSULT TO GASTROENTEROLOGY  IP CONSULT TO PALLIATIVE CARE - PROVIDER  IP CONSULT TO GASTROENTEROLOGY    PROCEDURES/SURGERIES: Procedure(s):  ENDOSCOPIC RETROGRADE CHOLANGIOPANCREATOGRAPHY (ERCP)  ENDOSCOPIC ULTRASOUND (EUS)  ESOPHAGOGASTRODUODENOSCOPY (EGD)  FINE NEEDLE BIOPSY  ENDOSCOPIC SPHINCTEROTOMY  BILIARY STENT PLACEMENT    ADMITTING [address] [place] [address] COURSE:     DISCHARGE DIAGNOSES / PLAN:        80 y.o. female who presents with abdominal pain, and jaundice.      Pancreatic adeno carcinoma  Obstructive jaundice  Cancer related pain  - GI following, s/p ERCP and EUS  2/17- she had a Bile duct stricture-stent placed  - -GI and onc following  -path report-adenocarcinoma  -OP Gi follow up for stent change , no aspirin for 7 days  Patient/family after discussing with the oncologist opted for hospice care at home, [facility].        HTN - continue  hydralazine 75 mg  and metoprolol.     Hypokalemia - replete as needed.        H/o seizure disorder  - on keppra     Anemia - likely due to chronic disease due to malignancy. Transfuse if less than 7. Holding ASA for now.         H/o breast cancer - reported in remission, was > 10 year ago per patient. CM spoke to grand daughter on the day of discharge. I called her as well, she did not answer,left voice mail.         Xr Chest Sngl V    Result Date: 1/29/2021  EXAMINATION: XR CHEST SNGL V HISTORY: Chest pain. COMPARISON: 12/2/2020 FINDINGS: Single view. There is a persistent left pleural effusion. There are basilar airspace opacities. There is no pneumothorax. There is unchanged enlargement of cardiac silhouette. Thoracic aorta is atherosclerotic. Persistent left pleural effusion with basilar airspace opacities, which may be related to atelectasis or pneumonia. Xr Chest Sngl V    Result Date: 12/2/2020  Chest single view. Comparison single view chest May 27, 2020 Hazy reticular markings more so through depended lungs. Suspect combination mild interstitial edema superimposed upon chronic change. Persistent or recurrent small to moderate volume dependent left pleural effusion. Cardiac and mediastinal structures unchanged noting thoracic aorta atherosclerosis. No pneumothorax. Xr Swallow Func Video    Result Date: 12/10/2020  Modified barium swallow, 12/10/2020 History: R 13.13 Procedure: This study was performed in conjunction with the speech pathologist. Fluoroscopic evaluation was performed as the patient ingested various consistencies of barium. The patient has an episode of penetration during ingestion of thin consistency by straw. No michelle aspiration occurred. The patient ingested nectar, pudding and solid consistencies without penetration or aspiration. Fluoroscopy time: 0:57 seconds. Dose: 14 mGy. Number of images: 1. Impression: Episode of penetration with thin consistency. Mri Brain Wo Cont    Result Date: 12/3/2020  MRI of the brain without IV contrast Routine imaging performed. Motion degraded study. No IV gadolinium administered. T1 sagittal images are degraded by motion. Thinning of the corpus callosum. Otherwise, no gross abnormality involving midline structures. T1 axial images reveal ventricular and sulcal prominence related to cerebral atrophy. Prior ophthalmologic surgery.  FLAIR and T2 axial images demonstrate patchy increased signal through periventricular and subcortical white matter suggesting gliosis related to nonacute end vessel occlusive change. Encephalomalacia noted right posterior parietal cerebrum correlative with CT head findings one day earlier. Diffusion-weighted images demonstrate no signal abnormality/diffusion restriction to suggest presence of acute vascular insult. Hemosiderin sensitive images reveal no blooming artifact as might be seen with hemosiderin deposition from prior intracranial hemorrhage or vascular malformation. IMPRESSION: Cerebral atrophy. Right posterior parietal encephalomalacia. Periventricular/subcortical white matter gliosis. Evidence for advanced nonacute end vessel occlusive change. Diffusion-weighted images demonstrate no signal abnormality/diffusion restriction to suggest the presence of acute vascular insult. Ct Head Wo Cont    Result Date: 2/17/2021  EXAM: CT HEAD WO CONT INDICATION: rule out mets COMPARISON: December 3. CONTRAST: None. TECHNIQUE: Unenhanced CT of the head was performed using 5 mm images. Brain and bone windows were generated. Coronal and sagittal reformats. CT dose reduction was achieved through use of a standardized protocol tailored for this examination and automatic exposure control for dose modulation. FINDINGS: The ventricles and sulci are enlarged. There is periventricular hypoattenuation. There is no intracranial hemorrhage, extra-axial collection, or mass effect. The basilar cisterns are open. There is an area of hypoattenuation in the right parietal-occipital lobe which is stable. The bone windows demonstrate no abnormalities. The visualized portions of the paranasal sinuses and mastoid air cells are clear. No acute findings. Ct Chest Wo Cont    Result Date: 2/18/2021  INDICATION: Possible metastases COMPARISON: May 27 CONTRAST: None. TECHNIQUE:  5 mm axial images were obtained through the thorax. Coronal and sagittal reformats were generated.   CT dose reduction was achieved through use of a standardized protocol tailored for this examination and automatic exposure control for dose modulation. The absence of intravenous contrast reduces the sensitivity for evaluation of the mediastinum, maurice, vasculature, and upper abdominal organs. FINDINGS: CHEST WALL: No mass or axillary lymphadenopathy. THYROID: No nodule. MEDIASTINUM: No mass or lymphadenopathy. MAURICE: No mass or lymphadenopathy. THORACIC AORTA: No aneurysm. MAIN PULMONARY ARTERY: Normal in caliber. TRACHEA/BRONCHI: Patent. ESOPHAGUS: No wall thickening or dilatation. HEART: Normal in size. PLEURA: Bilateral pleural effusions. Bibasilar atelectasis. 18 mm nodular density left lung base. This previously measured 15 mm on the last study. Spiculated 9 mm right upper lobe pulmonary nodule, new from prior study. LUNGS: No nodule, mass, or airspace disease. INCIDENTALLY IMAGED UPPER ABDOMEN: Retroperitoneal lymphadenopathy. Cholelithiasis. Pancreatic head mass. Pneumobilia. BONES: No destructive bone lesion. 1. Interval enlargement of left basilar lung nodule when compared to prior examination. 2. New spiculated right upper lobe pulmonary nodule. 3. Bilateral pleural effusions and bibasilar atelectasis. 4. Retroperitoneal lymphadenopathy and pancreatic mass or lymphadenopathy. Ct Abd Pelv W Cont    Result Date: 2/16/2021  EXAM: CT ABD PELV W CONT INDICATION: upper abdominal pain, transaminitis COMPARISON: 2015 CONTRAST: 100 mL of Isovue-370. TECHNIQUE: Following the uneventful intravenous administration of contrast, thin axial images were obtained through the abdomen and pelvis. Coronal and sagittal reconstructions were generated. Oral contrast was not administered. CT dose reduction was achieved through use of a standardized protocol tailored for this examination and automatic exposure control for dose modulation. FINDINGS: LOWER THORAX: There is a 2 x 1.5 cm nodule left lung base. There is slight basilar atelectasis. There is cardiomegaly.  LIVER: There is intrahepatic ductal dilatation. There is extra hepatic ductal dilatation BILIARY TREE gallbladder is distended and there are gallstones. Common duct is dilated and there is a mass in the region of the pancreatic head extending superiorly measuring 4.7 x 3.8 cm obstructing the duct. SPLEEN: within normal limits. PANCREAS: There is dilatation of the duct in the pancreatic tail. Pancreatic duct in the head is not dilated. There is a mass involving the superior aspect of the pancreatic head extending into the angela hepatis region with slight narrowing of the main portal vein. ADRENALS: Nodule in the right adrenal gland is unchanged. KIDNEYS: No mass, calculus, or hydronephrosis. There is a right renal cyst STOMACH: Unremarkable. SMALL BOWEL: No dilatation or wall thickening. COLON: No dilatation or wall thickening. APPENDIX: Not identified PERITONEUM: No ascites or pneumoperitoneum. RETROPERITONEUM: There is adenopathy in the hepatogastric ligament. There is adenopathy in the retroperitoneum. There is an enlarged lymph node between the aorta and IVC measuring 3.1 cm. REPRODUCTIVE ORGANS: Patient is status post hysterectomy URINARY BLADDER: No mass or calculus. BONES: No destructive bone lesion. ABDOMINAL WALL: There is a small ventral wall hernia in the upper abdomen containing fat. ADDITIONAL COMMENTS: N/A     1. There is a 2 x 1.5 cm nodule left lung base which could represent metastatic disease versus primary neoplasm. 2. There is intrahepatic ductal dilatation. There is extrahepatic ductal dilatation. There is a mass in the region of the pancreatic head extending cephalad obstructing the common duct. There is also dilatation of the pancreatic duct in the body and tail and there is a normal-sized pancreatic duct in the pancreatic head. There is slight narrowing of the portal vein. There is hepatogastric ligament adenopathy.  There is retroperitoneal adenopathy Differential includes metastatic adenopathy obstructing the common duct and involving the pancreatic head versus neoplasm of the pancreatic head extending superiorly with retroperitoneal and hepatogastric ligament adenopathy. The gallbladder is distended and there are gallstones. Cta Code Neuro Head And Neck W Cont    Result Date: 12/2/2020  CTA Head And Neck 12/2/2020: Indication: Left-sided weakness. Comparison: Head CT without contrast from the same day. Technique: Contiguous thin section axial images of the head and neck were obtained from the aortic arch to vertex after the uneventful administration of intravenous contrast. Images were optimized for arterial opacification. Axial, coronal, and sagittal MIP images were obtained with 3-D reconstructions performed. Dose Reduction: All CT scans at this facility are performed using dose reduction optimization techniques as appropriate to a performed exam including the following: Automated exposure control, adjustments of the mA and/or kV according to patient size, or use of iterative reconstruction technique. Neck CTA: Cervical right ICA is patent bifurcation. Moderate calcific atherosclerosis of the bifurcation contributes to mild, nonflow limiting narrowing estimated 25-30%. Mid and distal cervical right ICA is widely patent. Left common carotid artery is patent to the bifurcation. Mild calcific plaque at the bifurcation without focal narrowing. Cervical left ICA is widely patent. Injection from the right upper extremity obscures the proximal right vertebral artery although it appears grossly patent. The slightly dominant left vertebral is also noted to be somewhat diminutive but appears patent. Subclavian arteries are diseased but patent bilaterally. Brachiocephalic artery is diseased at its origin but patent patchy groundglass opacity in the right upper lobe is noted consistent with pneumonia with COVID-19 not excluded.  Head CTA: Cavernous ICAs are diseased bilaterally, left greater than right, without focal flow-limiting narrowing. There is moderate to high-grade short segment flow-limiting narrowing of the distal M1 segment of the right MCA. The distal branches appear patent. The M1 segment left MCA is widely patent. The origin of the anterior division M2 segment of the left MCA demonstrates moderate flow-limiting narrowing. Distal left MCA branches appear patent. Intracranial vertebral arteries are mildly diseased but patent. Basilar artery is widely patent as are its distal branches. Fetal origin of the right MCA noted. Small left posterior communicating artery. Dural venous sinuses are patent. Negative for aneurysm or vascular mass. Impression: 1. Mild nonflow-limiting narrowing of the right carotid bifurcation by NASA criteria. 2. Moderate to high-grade flow-limiting narrowing of the distal M1 segment of the right MCA. 3. Moderate flow-limiting narrowing of the origin of the anterior division of the M2 segment of the left MCA. 4. Patchy groundglass opacity right upper lobe most consistent with pneumonia with COVID-19 not excluded. CT chest without contrast recommended. Multiple attempts to reach the emergency department were unsuccessful. Please note that all carotid bifurcation stenoses are measured as per NASCET criteria. Ct Code Neuro Head Wo Contrast    Result Date: 12/2/2020  CT head, 12/2/2020 History: Stroke. Technique: No intravenous contrast was administered. Multiple contiguous axial images were acquired from the vertex to the skull base. Coronal and sagittal reconstruction was made. All CT scans at this facility are performed using dose reduction optimization techniques as appropriate to perform the exam including the following: Automated exposure control, adjustments of the mA and/or kV according to patient size, or use iterative reconstruction technique. Comparison: CT head 12/19/2019. Findings:  Cortical volume loss and associated ventricular system dilatation are again noted. Encephalomalacia has developed in the right parieto-occipital lobe since CT head 12/19/2019. Periventricular and subcortical low attenuation is suggestive of small vessel ischemic disease. Gray-white differentiation is preserved. No acute intracranial hemorrhage or midline shift is identified. The calvarium is intact. The orbits and globes are intact. There is mild opacification of the left maxillary sinus. There is no specific CT evidence of acute sinusitis. The remainder of the paranasal sinuses and mastoid air cells are clear. Impression: No acute intracranial process. Interval development of encephalomalacia in the right parieto-occipital lobe. Findings conveyed to [facility] on 12/2/2020 at 11:13 AM.                               PENDING TEST RESULTS:   At the time of discharge the following test results are still pending: none    FOLLOW UP APPOINTMENTS:    Follow-up Information     Follow up With Specialties Details Why Contact Info    Jaclyn Ferguson MD Internal Medicine   [address] [facility]  [phone]           ADDITIONAL CARE RECOMMENDATIONS:   -[facility]    DIET: Regular Diet      EQUIPMENT needed: [facility] will provide all the equipment        DISCHARGE MEDICATIONS:  Current Discharge Medication List      CONTINUE these medications which have CHANGED    Details   acetaminophen (TYLENOL) 325 mg tablet Take 2 Tabs by mouth every six (6) hours as needed for Pain or Fever. Qty: 30 Tab, Refills: 0      hydrALAZINE (APRESOLINE) 50 mg tablet Take 1.5 Tabs by mouth three (3) times daily. Qty: 60 Tab, Refills: 0      levETIRAcetam (KEPPRA) 500 mg tablet Take 1 Tab by mouth two (2) times a day. Qty: 60 Tab, Refills: 0         CONTINUE these medications which have NOT CHANGED    Details   promethazine (PHENERGAN) 25 mg tablet Take 25 mg by mouth two (2) times daily as needed for Nausea. omeprazole (PRILOSEC) 40 mg capsule Take 40 mg by mouth daily. metoprolol tartrate (LOPRESSOR) 100 mg IR tablet Take 1 Tab by mouth two (2) times a day. Qty: 60 Tab, Refills: 0         STOP taking these medications       aspirin 81 mg chewable tablet Comments:   Reason for Stopping:         meclizine (ANTIVERT) 25 mg tablet Comments:   Reason for Stopping:         metFORMIN (GLUCOPHAGE) 500 mg tablet Comments:   Reason for Stopping:         pantoprazole (PROTONIX) 40 mg tablet Comments:   Reason for Stopping:                 NOTIFY YOUR PHYSICIAN FOR ANY OF THE FOLLOWING:   Fever over 101 degrees for 24 hours. Chest pain, shortness of breath, fever, chills, nausea, vomiting, diarrhea, change in mentation, falling, weakness, bleeding. Severe pain or pain not relieved by medications. Or, any other signs or symptoms that you may have questions about. DISPOSITION:  x  Home With:   OT  PT  HH  RN       Long term SNF/Inpatient Rehab    Independent/assisted living   x Hospice    Other:       PATIENT CONDITION AT DISCHARGE:     Functional status   x Poor     Deconditioned     Independent      Cognition     Lucid    x Forgetful     Dementia      Catheters/lines (plus indication)    Castillo     PICC     PEG    x None      Code status     Full code    x DNR      PHYSICAL EXAMINATION AT DISCHARGE:  Constitutional:   elderly patient    ENT:  Oral mucosa mosit, oropharynx benign,anicteric sclera, upper lip swelling improved   Resp:  CTA bilaterally. No wheezing/rhonchi/rales. No accessory muscle use   CV:  Regular rhythm, normal rate, no murmurs, S1, S2 wnl    GI:  Soft, non distended, non tender,bowel sounds +    Musculoskeletal:  No LE edema    Neurologic:  Moves all extremities. Alert and oriented ,hard of hearing                         Psych:  Not anxious nor agitated.         CHRONIC MEDICAL DIAGNOSES:  Problem List as of 2/23/2021 Date Reviewed: 2/17/2021          Codes Class Noted - Resolved    Pancreatic mass ICD-10-CM: K86.89  ICD-9-CM: 577.8  2/16/2021 - Present Hypertensive urgency ICD-10-CM: I16.0  ICD-9-CM: 401.9  12/14/2020 - Present        History of CVA (cerebrovascular accident) ICD-10-CM: Z86.73  ICD-9-CM: V12.54  12/14/2020 - Present        Left hemiplegia ([facility] ICD-10-CM: G81.94  ICD-9-CM: 342.90  12/14/2020 - Present    Overview Signed 12/14/2020  5:24 PM by Justin Pittman MD     From prior cva             Oropharyngeal dysphagia ICD-10-CM: R13.12  ICD-9-CM: 787.22  12/14/2020 - Present        Lab test negative for COVID-19 virus ICD-10-CM: Z03.818  ICD-9-CM: V71.83  12/14/2020 - Present        Asthenia ICD-10-CM: R53.1  ICD-9-CM: 780.79  12/14/2020 - Present        Seizure ([facility] ICD-10-CM: R56.9  ICD-9-CM: 780.39  12/2/2020 - Present        RESOLVED: Encephalopathy acute ICD-10-CM: G93.40  ICD-9-CM: 348.30  12/14/2020 - 12/14/2020        RESOLVED: Pneumonia ICD-10-CM: J18.9  ICD-9-CM: 235  12/14/2020 - 12/14/2020        RESOLVED: RENAN (acute kidney injury) ([facility] ICD-10-CM: N17.9  ICD-9-CM: 584.9  12/14/2020 - 12/14/2020        RESOLVED: Hypokalemia ICD-10-CM: E87.6  ICD-9-CM: 276.8  12/14/2020 - 12/14/2020        RESOLVED: Hypernatremia ICD-10-CM: E87.0  ICD-9-CM: 276.0  12/14/2020 - 12/14/2020              25  minutes were spent with the patient on counseling and coordination of care    Signed:   Jayson Raymond MD  2/23/2021  2:18 PM

## 2021-02-23 NOTE — PROGRESS NOTES
Bedside shift change report given to Marry Modi RN (oncoming nurse) by Scar Lee RN (offgoing nurse). Report included the following information SBAR, Kardex, Intake/Output, MAR and Recent Results.

## 2021-02-23 NOTE — PROGRESS NOTES
Verbally notified MD Edy Stephen of pt's BP over 979 systolic. She stated to give IV hydralazine and scheduled BP meds.

## 2021-02-24 NOTE — DISCHARGE INSTRUCTIONS
Discharge Instructions       PATIENT ID: Jovan Davis  MRN: [id number]   YOB: 1931    DATE OF ADMISSION: 2/16/2021  1:56 PM    DATE OF DISCHARGE: 2/23/2021    PRIMARY CARE PROVIDER: Andres Jurado MD     ATTENDING PHYSICIAN: Doris Cristobal MD  DISCHARGING PROVIDER: Maeve Lyn MD    To contact this individual call [phone] and ask the  to page. If unavailable ask to be transferred the Adult Hospitalist Department. DISCHARGE DIAGNOSES -pancreatic cancer    CONSULTATIONS: ED CONSULT TO SENIOR SERVICES NURSE PRACTITIONER  IP CONSULT TO ONCOLOGY  IP CONSULT TO GASTROENTEROLOGY  IP CONSULT TO PALLIATIVE CARE - PROVIDER  IP CONSULT TO GASTROENTEROLOGY    PROCEDURES/SURGERIES: Procedure(s):  ENDOSCOPIC RETROGRADE CHOLANGIOPANCREATOGRAPHY (ERCP)  ENDOSCOPIC ULTRASOUND (EUS)  ESOPHAGOGASTRODUODENOSCOPY (EGD)  FINE NEEDLE BIOPSY  ENDOSCOPIC SPHINCTEROTOMY  BILIARY STENT PLACEMENT    PENDING TEST RESULTS:   At the time of discharge the following test results are still pending-none    FOLLOW UP APPOINTMENTS:   Follow-up Information     Follow up With Specialties Details Why Contact Info    Andres Jurado MD Internal Medicine   [facility]  [phone]             ADDITIONAL CARE RECOMMENDATIONS:   -[facility]    DIET: Regular Diet      EQUIPMENT needed: [facility] will provide all the equipment    Radiology      DISCHARGE MEDICATIONS:   See Medication Reconciliation Form    · It is important that you take the medication exactly as they are prescribed. · Keep your medication in the bottles provided by the pharmacist and keep a list of the medication names, dosages, and times to be taken in your wallet. · Do not take other medications without consulting your doctor. NOTIFY YOUR PHYSICIAN FOR ANY OF THE FOLLOWING:   Fever over 101 degrees for 24 hours.    Chest pain, shortness of breath, fever, chills, nausea, vomiting, diarrhea, change in mentation, falling, weakness, bleeding. Severe pain or pain not relieved by medications. Or, any other signs or symptoms that you may have questions about.       DISPOSITION:   X Home With:   OT  PT  HH  RN       SNF/Inpatient Rehab/LTAC    Independent/assisted living    Hospice    Other:           Signed:   Geovanny Sheffield MD  2/23/2021  9:08 AM

## 2021-03-05 ENCOUNTER — APPOINTMENT (OUTPATIENT)
Dept: NON INVASIVE DIAGNOSTICS | Age: 86
DRG: 871 | End: 2021-03-05
Attending: INTERNAL MEDICINE
Payer: MEDICARE

## 2021-03-05 ENCOUNTER — APPOINTMENT (OUTPATIENT)
Dept: GENERAL RADIOLOGY | Age: 86
DRG: 871 | End: 2021-03-05
Attending: EMERGENCY MEDICINE
Payer: MEDICARE

## 2021-03-05 ENCOUNTER — HOSPITAL ENCOUNTER (INPATIENT)
Age: 86
LOS: 6 days | Discharge: HOSPICE | DRG: 871 | End: 2021-03-11
Attending: EMERGENCY MEDICINE | Admitting: FAMILY MEDICINE
Payer: MEDICARE

## 2021-03-05 ENCOUNTER — APPOINTMENT (OUTPATIENT)
Dept: CT IMAGING | Age: 86
DRG: 871 | End: 2021-03-05
Attending: INTERNAL MEDICINE
Payer: MEDICARE

## 2021-03-05 DIAGNOSIS — I47.1 SVT (SUPRAVENTRICULAR TACHYCARDIA) (HCC): Primary | ICD-10-CM

## 2021-03-05 DIAGNOSIS — I48.91 ATRIAL FIBRILLATION WITH RVR (HCC): ICD-10-CM

## 2021-03-05 DIAGNOSIS — R77.8 ELEVATED TROPONIN I LEVEL: ICD-10-CM

## 2021-03-05 PROBLEM — E83.42 HYPOMAGNESEMIA: Status: ACTIVE | Noted: 2021-03-05

## 2021-03-05 PROBLEM — I10 HYPERTENSION: Status: ACTIVE | Noted: 2021-03-05

## 2021-03-05 LAB
ALBUMIN SERPL-MCNC: 2.1 G/DL (ref 3.5–5)
ALBUMIN/GLOB SERPL: 0.4 {RATIO} (ref 1.1–2.2)
ALP SERPL-CCNC: 616 U/L (ref 45–117)
ALT SERPL-CCNC: 42 U/L (ref 12–78)
ANION GAP SERPL CALC-SCNC: 13 MMOL/L (ref 5–15)
APPEARANCE UR: ABNORMAL
AST SERPL W P-5'-P-CCNC: 144 U/L (ref 15–37)
ATRIAL RATE: 208 BPM
BACTERIA URNS QL MICRO: NEGATIVE /HPF
BASOPHILS # BLD: 0 K/UL (ref 0–0.1)
BASOPHILS NFR BLD: 0 % (ref 0–1)
BILIRUB SERPL-MCNC: 5.8 MG/DL (ref 0.2–1)
BILIRUB UR QL: ABNORMAL
BUN SERPL-MCNC: 20 MG/DL (ref 6–20)
BUN/CREAT SERPL: 14 (ref 12–20)
CA-I BLD-MCNC: 9.4 MG/DL (ref 8.5–10.1)
CALCULATED R AXIS, ECG10: -11 DEGREES
CALCULATED T AXIS, ECG11: 158 DEGREES
CHLORIDE SERPL-SCNC: 107 MMOL/L (ref 97–108)
CO2 SERPL-SCNC: 23 MMOL/L (ref 21–32)
COLOR UR: ABNORMAL
CREAT SERPL-MCNC: 1.46 MG/DL (ref 0.55–1.02)
DIAGNOSIS, 93000: NORMAL
DIFFERENTIAL METHOD BLD: ABNORMAL
ECHO AV AREA PEAK VELOCITY: 1.45 CM2
ECHO AV AREA VTI: 0.86 CM2
ECHO AV AREA/BSA PEAK VELOCITY: 0.7 CM2/M2
ECHO AV AREA/BSA VTI: 0.4 CM2/M2
ECHO AV MEAN GRADIENT: 17 MMHG
ECHO AV MEAN VELOCITY: 195 CM/S
ECHO AV PEAK GRADIENT: 23 MMHG
ECHO AV VTI: 46.3 CM
ECHO EST RA PRESSURE: 3 MMHG
ECHO LV E' SEPTAL VELOCITY: 4.23 CM/S
ECHO LV EDV A2C: 14.5 CM3
ECHO LV EJECTION FRACTION A2C: 72 %
ECHO LV EJECTION FRACTION BIPLANE: 72.1 % (ref 55–100)
ECHO LV EJECTION FRACTION BIPLANE: 78.1 % (ref 55–100)
ECHO LV ESV A2C: 14.1 CM3
ECHO LV ESV A2C: 3.18 CM3
ECHO LV INTERNAL DIMENSION DIASTOLIC MMODE: 3.49 CM
ECHO LV INTERNAL DIMENSION DIASTOLIC: 2.44 CM (ref 3.9–5.3)
ECHO LV INTERNAL DIMENSION SYSTOLIC MMODE: 2.08 CM
ECHO LV INTERNAL DIMENSION SYSTOLIC: 1.47 CM
ECHO LV IVSD MMODE: 1.79 CM
ECHO LV IVSD: 1.96 CM (ref 0.6–0.9)
ECHO LV MASS 2D: 159.4 G (ref 67–162)
ECHO LV MASS INDEX 2D: 80.6 G/M2 (ref 43–95)
ECHO LV POSTERIOR WALL DIASTOLIC MMODE: 1.65 CM
ECHO LV POSTERIOR WALL DIASTOLIC: 1.5 CM (ref 0.6–0.9)
ECHO LVOT DIAM: 1.8 CM
ECHO LVOT PEAK GRADIENT: 7 MMHG
ECHO LVOT SV: 33.5 CM3
ECHO LVOT SV: 40 CM3
ECHO LVOT VTI: 15.7 CM
ECHO LVOT VTI: 15.7 CM
ECHO MV A VELOCITY: 143 CM/S
ECHO MV AREA PHT: 1.52 CM2
ECHO MV E DECELERATION TIME (DT): 208 MS
ECHO MV E VELOCITY: 85 CM/S
ECHO MV E/A RATIO: 0.59
ECHO MV E/E' SEPTAL: 20.09
ECHO MV PRESSURE HALF TIME (PHT): 145 MS
ECHO PV PEAK INSTANTANEOUS GRADIENT SYSTOLIC: 2 MMHG
ECHO PV PEAK INSTANTANEOUS GRADIENT SYSTOLIC: 7 MMHG
ECHO PV REGURGITANT MAX VELOCITY: 128 CM/S
ECHO PV REGURGITANT MAX VELOCITY: 136 CM/S
ECHO PV REGURGITANT MAX VELOCITY: 239 CM/S
ECHO PV REGURGITANT MAX VELOCITY: 76.9 CM/S
ECHO PVEIN A DURATION: 106 MS
ECHO PVEIN A VELOCITY: 29.6 CM/S
ECHO RA AREA 4C: 22.9 CM2
ECHO RIGHT VENTRICULAR SYSTOLIC PRESSURE (RVSP): 57 MMHG
ECHO RV INTERNAL DIMENSION: 4.51 CM
ECHO TV MAX VELOCITY: 366 CM/S
ECHO TV REGURGITANT PEAK GRADIENT: 54 MMHG
EOSINOPHIL # BLD: 0 K/UL (ref 0–0.4)
EOSINOPHIL NFR BLD: 0 % (ref 0–7)
ERYTHROCYTE [DISTWIDTH] IN BLOOD BY AUTOMATED COUNT: 17.9 % (ref 11.5–14.5)
GLOBULIN SER CALC-MCNC: 5.8 G/DL (ref 2–4)
GLUCOSE SERPL-MCNC: 123 MG/DL (ref 65–100)
GLUCOSE UR STRIP.AUTO-MCNC: NEGATIVE MG/DL
HCT VFR BLD AUTO: 28 % (ref 35–47)
HGB BLD-MCNC: 8.5 G/DL (ref 11.5–16)
HGB UR QL STRIP: NEGATIVE
IMM GRANULOCYTES # BLD AUTO: 0.1 K/UL (ref 0–0.04)
IMM GRANULOCYTES NFR BLD AUTO: 0 % (ref 0–0.5)
KETONES UR QL STRIP.AUTO: NEGATIVE MG/DL
LEUKOCYTE ESTERASE UR QL STRIP.AUTO: NEGATIVE
LVOT MG: 2 MMHG
LYMPHOCYTES # BLD: 0.5 K/UL (ref 0.8–3.5)
LYMPHOCYTES NFR BLD: 3 % (ref 12–49)
MAGNESIUM SERPL-MCNC: 1.7 MG/DL (ref 1.6–2.4)
MCH RBC QN AUTO: 23.4 PG (ref 26–34)
MCHC RBC AUTO-ENTMCNC: 30.4 G/DL (ref 30–36.5)
MCV RBC AUTO: 77.1 FL (ref 80–99)
MONOCYTES # BLD: 0.6 K/UL (ref 0–1)
MONOCYTES NFR BLD: 3 % (ref 5–13)
MUCOUS THREADS URNS QL MICRO: ABNORMAL /LPF
MV DEC SLOPE: 1720 MM/S2
MV DEC SLOPE: 1720 MM/S2
NEUTS SEG # BLD: 17.4 K/UL (ref 1.8–8)
NEUTS SEG NFR BLD: 94 % (ref 32–75)
NITRITE UR QL STRIP.AUTO: NEGATIVE
OTHER URINE MICRO,5051: PRESENT
PH UR STRIP: 5 [PH] (ref 5–8)
PLATELET # BLD AUTO: 363 K/UL (ref 150–400)
PMV BLD AUTO: 11.3 FL (ref 8.9–12.9)
POTASSIUM SERPL-SCNC: 3.6 MMOL/L (ref 3.5–5.1)
PROT SERPL-MCNC: 7.9 G/DL (ref 6.4–8.2)
PROT UR STRIP-MCNC: 100 MG/DL
Q-T INTERVAL, ECG07: 208 MS
QRS DURATION, ECG06: 106 MS
QTC CALCULATION (BEZET), ECG08: 391 MS
RBC # BLD AUTO: 3.63 M/UL (ref 3.8–5.2)
RBC #/AREA URNS HPF: ABNORMAL /HPF (ref 0–5)
SODIUM SERPL-SCNC: 143 MMOL/L (ref 136–145)
SP GR UR REFRACTOMETRY: 1.02 (ref 1–1.03)
TROPONIN I SERPL-MCNC: 0.07 NG/ML
TROPONIN I SERPL-MCNC: 0.2 NG/ML
UROBILINOGEN UR QL STRIP.AUTO: 4 EU/DL (ref 0.1–1)
VENTRICULAR RATE, ECG03: 213 BPM
WBC # BLD AUTO: 18.6 K/UL (ref 3.6–11)
WBC URNS QL MICRO: ABNORMAL /HPF (ref 0–4)

## 2021-03-05 PROCEDURE — 74011000250 HC RX REV CODE- 250: Performed by: EMERGENCY MEDICINE

## 2021-03-05 PROCEDURE — 74011250636 HC RX REV CODE- 250/636: Performed by: EMERGENCY MEDICINE

## 2021-03-05 PROCEDURE — 36415 COLL VENOUS BLD VENIPUNCTURE: CPT

## 2021-03-05 PROCEDURE — 96374 THER/PROPH/DIAG INJ IV PUSH: CPT

## 2021-03-05 PROCEDURE — 85025 COMPLETE CBC W/AUTO DIFF WBC: CPT

## 2021-03-05 PROCEDURE — 99285 EMERGENCY DEPT VISIT HI MDM: CPT

## 2021-03-05 PROCEDURE — 84484 ASSAY OF TROPONIN QUANT: CPT

## 2021-03-05 PROCEDURE — 93005 ELECTROCARDIOGRAM TRACING: CPT

## 2021-03-05 PROCEDURE — 71045 X-RAY EXAM CHEST 1 VIEW: CPT

## 2021-03-05 PROCEDURE — 93306 TTE W/DOPPLER COMPLETE: CPT

## 2021-03-05 PROCEDURE — 96375 TX/PRO/DX INJ NEW DRUG ADDON: CPT

## 2021-03-05 PROCEDURE — 96365 THER/PROPH/DIAG IV INF INIT: CPT

## 2021-03-05 PROCEDURE — 74011250636 HC RX REV CODE- 250/636: Performed by: INTERNAL MEDICINE

## 2021-03-05 PROCEDURE — 96372 THER/PROPH/DIAG INJ SC/IM: CPT

## 2021-03-05 PROCEDURE — 96376 TX/PRO/DX INJ SAME DRUG ADON: CPT

## 2021-03-05 PROCEDURE — 74011000250 HC RX REV CODE- 250

## 2021-03-05 PROCEDURE — 70450 CT HEAD/BRAIN W/O DYE: CPT

## 2021-03-05 PROCEDURE — 87040 BLOOD CULTURE FOR BACTERIA: CPT

## 2021-03-05 PROCEDURE — 74011000250 HC RX REV CODE- 250: Performed by: INTERNAL MEDICINE

## 2021-03-05 PROCEDURE — 83735 ASSAY OF MAGNESIUM: CPT

## 2021-03-05 PROCEDURE — 81001 URINALYSIS AUTO W/SCOPE: CPT

## 2021-03-05 PROCEDURE — 74011250637 HC RX REV CODE- 250/637: Performed by: INTERNAL MEDICINE

## 2021-03-05 PROCEDURE — 65610000006 HC RM INTENSIVE CARE

## 2021-03-05 PROCEDURE — 80053 COMPREHEN METABOLIC PANEL: CPT

## 2021-03-05 PROCEDURE — 87186 SC STD MICRODIL/AGAR DIL: CPT

## 2021-03-05 PROCEDURE — 87077 CULTURE AEROBIC IDENTIFY: CPT

## 2021-03-05 PROCEDURE — 84443 ASSAY THYROID STIM HORMONE: CPT

## 2021-03-05 RX ORDER — MAGNESIUM SULFATE HEPTAHYDRATE 40 MG/ML
2 INJECTION, SOLUTION INTRAVENOUS ONCE
Status: COMPLETED | OUTPATIENT
Start: 2021-03-05 | End: 2021-03-05

## 2021-03-05 RX ORDER — ACETAMINOPHEN 650 MG/1
650 SUPPOSITORY RECTAL
Status: DISCONTINUED | OUTPATIENT
Start: 2021-03-05 | End: 2021-03-11 | Stop reason: HOSPADM

## 2021-03-05 RX ORDER — METOPROLOL TARTRATE 5 MG/5ML
5 INJECTION INTRAVENOUS
Status: COMPLETED | OUTPATIENT
Start: 2021-03-05 | End: 2021-03-05

## 2021-03-05 RX ORDER — ENOXAPARIN SODIUM 100 MG/ML
40 INJECTION SUBCUTANEOUS ONCE
Status: COMPLETED | OUTPATIENT
Start: 2021-03-05 | End: 2021-03-05

## 2021-03-05 RX ORDER — ADENOSINE 3 MG/ML
12 INJECTION, SOLUTION INTRAVENOUS
Status: DISCONTINUED | OUTPATIENT
Start: 2021-03-05 | End: 2021-03-05

## 2021-03-05 RX ORDER — ACETAMINOPHEN 325 MG/1
650 TABLET ORAL
Status: DISCONTINUED | OUTPATIENT
Start: 2021-03-05 | End: 2021-03-11 | Stop reason: HOSPADM

## 2021-03-05 RX ORDER — LEVETIRACETAM 500 MG/1
500 TABLET ORAL 2 TIMES DAILY
Status: DISCONTINUED | OUTPATIENT
Start: 2021-03-05 | End: 2021-03-11 | Stop reason: HOSPADM

## 2021-03-05 RX ORDER — ONDANSETRON 2 MG/ML
4 INJECTION INTRAMUSCULAR; INTRAVENOUS
Status: DISCONTINUED | OUTPATIENT
Start: 2021-03-05 | End: 2021-03-11 | Stop reason: HOSPADM

## 2021-03-05 RX ORDER — ADENOSINE 3 MG/ML
12 INJECTION, SOLUTION INTRAVENOUS
Status: COMPLETED | OUTPATIENT
Start: 2021-03-05 | End: 2021-03-05

## 2021-03-05 RX ORDER — DIGOXIN 0.25 MG/ML
0.25 INJECTION INTRAMUSCULAR; INTRAVENOUS
Status: COMPLETED | OUTPATIENT
Start: 2021-03-05 | End: 2021-03-05

## 2021-03-05 RX ORDER — DILTIAZEM HYDROCHLORIDE 5 MG/ML
INJECTION INTRAVENOUS
Status: DISCONTINUED
Start: 2021-03-05 | End: 2021-03-05

## 2021-03-05 RX ORDER — OMEPRAZOLE 40 MG/1
40 CAPSULE, DELAYED RELEASE ORAL DAILY
Status: DISCONTINUED | OUTPATIENT
Start: 2021-03-06 | End: 2021-03-05

## 2021-03-05 RX ORDER — PANTOPRAZOLE SODIUM 40 MG/1
40 TABLET, DELAYED RELEASE ORAL
Status: DISCONTINUED | OUTPATIENT
Start: 2021-03-06 | End: 2021-03-11 | Stop reason: HOSPADM

## 2021-03-05 RX ORDER — PROMETHAZINE HYDROCHLORIDE 25 MG/1
12.5 TABLET ORAL
Status: DISCONTINUED | OUTPATIENT
Start: 2021-03-05 | End: 2021-03-11 | Stop reason: HOSPADM

## 2021-03-05 RX ORDER — POTASSIUM CHLORIDE 20 MEQ/1
40 TABLET, EXTENDED RELEASE ORAL
Status: COMPLETED | OUTPATIENT
Start: 2021-03-05 | End: 2021-03-05

## 2021-03-05 RX ORDER — DILTIAZEM HCL/D5W 125 MG/125
5 PLASTIC BAG, INJECTION (ML) INTRAVENOUS
Status: DISCONTINUED | OUTPATIENT
Start: 2021-03-05 | End: 2021-03-06

## 2021-03-05 RX ORDER — POLYETHYLENE GLYCOL 3350 17 G/17G
17 POWDER, FOR SOLUTION ORAL DAILY PRN
Status: DISCONTINUED | OUTPATIENT
Start: 2021-03-05 | End: 2021-03-11 | Stop reason: HOSPADM

## 2021-03-05 RX ORDER — PROMETHAZINE HYDROCHLORIDE 25 MG/1
25 TABLET ORAL
Status: DISCONTINUED | OUTPATIENT
Start: 2021-03-05 | End: 2021-03-11 | Stop reason: HOSPADM

## 2021-03-05 RX ORDER — DILTIAZEM HYDROCHLORIDE 5 MG/ML
10 INJECTION INTRAVENOUS
Status: COMPLETED | OUTPATIENT
Start: 2021-03-05 | End: 2021-03-05

## 2021-03-05 RX ORDER — SODIUM CHLORIDE 0.9 % (FLUSH) 0.9 %
5-40 SYRINGE (ML) INJECTION AS NEEDED
Status: DISCONTINUED | OUTPATIENT
Start: 2021-03-05 | End: 2021-03-06

## 2021-03-05 RX ORDER — SODIUM CHLORIDE 0.9 % (FLUSH) 0.9 %
5-40 SYRINGE (ML) INJECTION EVERY 8 HOURS
Status: DISCONTINUED | OUTPATIENT
Start: 2021-03-05 | End: 2021-03-06

## 2021-03-05 RX ADMIN — POTASSIUM CHLORIDE 40 MEQ: 1500 TABLET, EXTENDED RELEASE ORAL at 13:28

## 2021-03-05 RX ADMIN — DIGOXIN 250 MCG: 250 INJECTION, SOLUTION INTRAMUSCULAR; INTRAVENOUS; PARENTERAL at 17:05

## 2021-03-05 RX ADMIN — ENOXAPARIN SODIUM 40 MG: 40 INJECTION SUBCUTANEOUS at 11:38

## 2021-03-05 RX ADMIN — DILTIAZEM HYDROCHLORIDE 10 MG: 5 INJECTION INTRAVENOUS at 11:06

## 2021-03-05 RX ADMIN — METOPROLOL TARTRATE 5 MG: 5 INJECTION INTRAVENOUS at 10:45

## 2021-03-05 RX ADMIN — CEFTRIAXONE 1 G: 1 INJECTION, POWDER, FOR SOLUTION INTRAMUSCULAR; INTRAVENOUS at 13:28

## 2021-03-05 RX ADMIN — ADENOSINE 12 MG: 3 INJECTION, SOLUTION INTRAVENOUS at 10:34

## 2021-03-05 RX ADMIN — Medication 10 ML: at 22:00

## 2021-03-05 RX ADMIN — LEVETIRACETAM 500 MG: 500 TABLET ORAL at 22:47

## 2021-03-05 RX ADMIN — MAGNESIUM SULFATE HEPTAHYDRATE 2 G: 40 INJECTION, SOLUTION INTRAVENOUS at 13:28

## 2021-03-05 RX ADMIN — Medication 5 MG/HR: at 11:16

## 2021-03-05 NOTE — ED PROVIDER NOTES
EMERGENCY DEPARTMENT HISTORY AND PHYSICAL EXAM      Date: 3/5/2021  Patient Name: Erika Tamayo      History of Presenting Illness     Chief Complaint   Patient presents with    SVT    Shortness of Breath       History Provided By: Patient and EMS    HPI: Erika Tamayo, 80 y.o. female with a past medical history significant obesity and possible seizure, who was found to have palpitation and generalized weakness this morning. According to EMS patient was found to have fast heartbeat possibly SVT. Patient then was transported to the emergency department. Since patient did not have any access she was not given any medicine via EMS. Patient denies any headache, fever, chest pain or shortness of breath at this time. Patient denies any other complaints at this time. There are no other complaints, changes, or physical findings at this time. PCP: Charlie Silverman MD    Current Facility-Administered Medications   Medication Dose Route Frequency Provider Last Rate Last Admin    dilTIAZem (CARDIZEM) 125 mg/125 mL (1 mg/mL) dextrose 5% infusion  5 mg/hr IntraVENous TITRATE Jenni Ramos H, DO 10 mL/hr at 03/05/21 1138 10 mg/hr at 03/05/21 1138    potassium chloride (K-DUR, KLOR-CON) SR tablet 40 mEq  40 mEq Oral NOW Jackie Amado MD        magnesium sulfate 2 g/50 ml IVPB (premix or compounded)  2 g IntraVENous ONCE Jackie Amado MD         Current Outpatient Medications   Medication Sig Dispense Refill    acetaminophen (TYLENOL) 325 mg tablet Take 2 Tabs by mouth every six (6) hours as needed for Pain or Fever. 30 Tab 0    hydrALAZINE (APRESOLINE) 50 mg tablet Take 1.5 Tabs by mouth three (3) times daily. 60 Tab 0    levETIRAcetam (KEPPRA) 500 mg tablet Take 1 Tab by mouth two (2) times a day. 60 Tab 0    promethazine (PHENERGAN) 25 mg tablet Take 25 mg by mouth two (2) times daily as needed for Nausea.  omeprazole (PRILOSEC) 40 mg capsule Take 40 mg by mouth daily.  metoprolol tartrate (LOPRESSOR) 100 mg IR tablet Take 1 Tab by mouth two (2) times a day. 61 Tab 0       Past History     Past Medical History:  Past Medical History:   Diagnosis Date    [facility] [address]     Hypertension     [facility]     Seizure [facility] [address]     St[facility]ce[facility]brum) ([facility] 2019       Past Surgical History:  No past surgical history on file. Family History:  No family history on file. Social History:  Social History     Tobacco Use    Smoking status: Never Smoker    Smokeless tobacco: Never Used   Substance Use Topics    Alcohol use: Never     Frequency: Never    Drug use: Never       Allergies:  No Known Allergies      Review of Systems     Review of Systems   Constitutional: Negative. HENT: Negative. Eyes: Negative. Respiratory: Negative. Cardiovascular: Positive for palpitations. Gastrointestinal: Negative. Genitourinary: Negative. Musculoskeletal: Negative. Skin: Negative. Neurological: Positive for weakness. Psychiatric/Behavioral: Negative. All other systems reviewed and are negative. Physical Exam     Physical Exam  Vitals signs and nursing note reviewed. Constitutional:       General: She is not in acute distress. Appearance: Normal appearance. She is normal weight. She is not ill-appearing or diaphoretic. Interventions: She is not intubated. HENT:      Head: Normocephalic. Right Ear: External ear normal.      Left Ear: External ear normal.      Nose: Nose normal. No congestion or rhinorrhea. Mouth/Throat:      Pharynx: Oropharynx is clear. No oropharyngeal exudate or posterior oropharyngeal erythema. Eyes:      General: No scleral icterus. Right eye: No discharge. Left eye: No discharge. Extraocular Movements: Extraocular movements intact. Conjunctiva/sclera: Conjunctivae normal.      Pupils: Pupils are equal, round, and reactive to light.    Neck:      Musculoskeletal: Normal range of motion and neck supple. No neck rigidity or muscular tenderness. Cardiovascular:      Rate and Rhythm: Tachycardia present. Rhythm irregular. Pulses: Normal pulses. Heart sounds: Normal heart sounds. No murmur. Pulmonary:      Effort: Pulmonary effort is normal. No tachypnea, accessory muscle usage or respiratory distress. She is not intubated. Breath sounds: Normal breath sounds. No stridor. No wheezing, rhonchi or rales. Chest:      Chest wall: No deformity, tenderness or edema. Abdominal:      General: Abdomen is flat. Bowel sounds are normal. There is no distension. Palpations: Abdomen is soft. Tenderness: There is no abdominal tenderness. There is no right CVA tenderness, left CVA tenderness, guarding or rebound. Musculoskeletal: Normal range of motion. General: No swelling, tenderness, deformity or signs of injury. Right lower leg: No edema. Left lower leg: No edema. Skin:     General: Skin is warm. Capillary Refill: Capillary refill takes less than 2 seconds. Coloration: Skin is not jaundiced or pale. Findings: No bruising, erythema, lesion or rash. Neurological:      General: No focal deficit present. Mental Status: She is alert and oriented to person, place, and time. Mental status is at baseline. Cranial Nerves: No cranial nerve deficit. Sensory: No sensory deficit. Motor: No weakness. Coordination: Coordination normal.   Psychiatric:         Mood and Affect: Mood normal.         Behavior: Behavior normal.         Thought Content:  Thought content normal.         Judgment: Judgment normal.         Lab and Diagnostic Study Results     Labs -     Recent Results (from the past 12 hour(s))   CBC WITH AUTOMATED DIFF    Collection Time: 03/05/21 11:00 AM   Result Value Ref Range    WBC 18.6 (H) 3.6 - 11.0 K/uL    RBC 3.63 (L) 3.80 - 5.20 M/uL    HGB 8.5 (L) 11.5 - 16.0 g/dL    HCT 28.0 (L) 35.0 - 47.0 % MCV 77.1 (L) 80.0 - 99.0 FL    MCH 23.4 (L) 26.0 - 34.0 PG    MCHC 30.4 30.0 - 36.5 g/dL    RDW 17.9 (H) 11.5 - 14.5 %    PLATELET 131 319 - 030 K/uL    MPV 11.3 8.9 - 12.9 FL    NEUTROPHILS 94 (H) 32 - 75 %    LYMPHOCYTES 3 (L) 12 - 49 %    MONOCYTES 3 (L) 5 - 13 %    EOSINOPHILS 0 0 - 7 %    BASOPHILS 0 0 - 1 %    IMMATURE GRANULOCYTES 0 0.0 - 0.5 %    ABS. NEUTROPHILS 17.4 (H) 1.8 - 8.0 K/UL    ABS. LYMPHOCYTES 0.5 (L) 0.8 - 3.5 K/UL    ABS. MONOCYTES 0.6 0.0 - 1.0 K/UL    ABS. EOSINOPHILS 0.0 0.0 - 0.4 K/UL    ABS. BASOPHILS 0.0 0.0 - 0.1 K/UL    ABS. IMM. GRANS. 0.1 (H) 0.00 - 0.04 K/UL    DF AUTOMATED     METABOLIC PANEL, COMPREHENSIVE    Collection Time: 03/05/21 11:00 AM   Result Value Ref Range    Sodium 143 136 - 145 mmol/L    Potassium 3.6 3.5 - 5.1 mmol/L    Chloride 107 97 - 108 mmol/L    CO2 23 21 - 32 mmol/L    Anion gap 13 5 - 15 mmol/L    Glucose 123 (H) 65 - 100 mg/dL    BUN 20 6 - 20 mg/dL    Creatinine 1.46 (H) 0.55 - 1.02 mg/dL    BUN/Creatinine ratio 14 12 - 20      GFR est AA 41 (L) >60 ml/min/1.73m2    GFR est non-AA 34 (L) >60 ml/min/1.73m2    Calcium 9.4 8.5 - 10.1 mg/dL    Bilirubin, total 5.8 (H) 0.2 - 1.0 mg/dL    AST (SGOT) 144 (H) 15 - 37 U/L    ALT (SGPT) 42 12 - 78 U/L    Alk. phosphatase 616 (H) 45 - 117 U/L    Protein, total 7.9 6.4 - 8.2 g/dL    Albumin 2.1 (L) 3.5 - 5.0 g/dL    Globulin 5.8 (H) 2.0 - 4.0 g/dL    A-G Ratio 0.4 (L) 1.1 - 2.2     TROPONIN I    Collection Time: 03/05/21 11:00 AM   Result Value Ref Range    Troponin-I, Qt. 0.07 (H) <0.05 ng/mL   MAGNESIUM    Collection Time: 03/05/21 11:00 AM   Result Value Ref Range    Magnesium 1.7 1.6 - 2.4 mg/dL       Radiologic Studies -   [unfilled]  CT Results  (Last 48 hours)    None        CXR Results  (Last 48 hours)               03/05/21 1110  XR CHEST PORT Final result    Narrative:  Chest single view. Comparison single view chest January 29, 2021.        EKG pads overlie significant portion of the right chest. Approximate 1 cm left basilar nodular density. Pulmonary nodules reported on CT   chest February 17, 2021. Coarse reticular markings present through the lungs. No gross interstitial or   alveolar pulmonary edema. Cardiac and mediastinal structures unchanged. Thoracic   aorta atherosclerosis. No pneumothorax or sizable pleural effusion. Medical Decision Making and ED Course   - I am the first and primary provider for this patient AND AM THE PRIMARY PROVIDER OF RECORD. - I reviewed the vital signs, available nursing notes, past medical history, past surgical history, family history and social history. - Initial assessment performed. The patients presenting problems have been discussed, and the staff are in agreement with the care plan formulated and outlined with them. I have encouraged them to ask questions as they arise throughout their visit. Vital Signs-Reviewed the patient's vital signs. Patient Vitals for the past 12 hrs:   Temp Pulse Resp BP SpO2   03/05/21 1140  (!) 130 24 135/75 99 %   03/05/21 1122  (!) 124  (!) 155/65 98 %   03/05/21 1102  (!) 141  (!) 148/73    03/05/21 1045  (!) 193  (!) 145/71    03/05/21 1028 99 °F (37.2 °C) (!) 219 (!) 40 112/71 99 %       EKG interpretation: (Preliminary): First EKG was done at 10:41 AM.  Patient is in SVT with a rate of 233. Normal axis. No STEMI. Second EKG was obtained at 10:48 AM after 12 of adenosine and 5 of Lopressor IV were given. Patient is in atrial fibrillation with RVR and rate of 125. Positive multiple PVCs. No STEMI at this time.   Records Reviewed: Nursing Notes        ED Course:              Provider Notes (Medical Decision Making):     MDM  Number of Diagnoses or Management Options  Atrial fibrillation with RVR [facility]): new, needed workup  Elevated troponin I level: new, needed workup  SVT (supraventricular tachycardia) ([address]): new, needed workup  Diagnosis management comments: Differential diagnosis includes SVT, atrial fibrillation with RVR, atrial flutter, other cardiac abnormality, cardiac events, dehydration, hyperthyroidism. Amount and/or Complexity of Data Reviewed  Clinical lab tests: ordered and reviewed  Tests in the radiology section of CPT®: ordered and reviewed  Tests in the medicine section of CPT®: ordered and reviewed  Discussion of test results with the performing providers: yes  Decide to obtain previous medical records or to obtain history from someone other than the patient: yes  Obtain history from someone other than the patient: yes  Review and summarize past medical records: yes  Discuss the patient with other providers: yes (Case discussed with . Will admit to his service.)  Independent visualization of images, tracings, or specimens: yes (First EKG was done at 10:41 AM.  Patient is in SVT with a rate of 233. Normal axis. No STEMI. Second EKG was obtained at 10:48 AM after 12 of adenosine and 5 of Lopressor IV were given. Patient is in atrial fibrillation with RVR and rate of 125. Positive multiple PVCs. No STEMI at this time.)    Risk of Complications, Morbidity, and/or Mortality  Presenting problems: high  Diagnostic procedures: high  Management options: high  General comments: Upon arrival patient was found to be in SVT rate about 220. We had difficulty to have peripheral IV access. IR was placed in the right proximal tibia. Right EJ was obtained. Initially adenosine 12 mg was given with good results. However the rate went back up to 180s. Lopressor 5 mg IV was given. To bolus IV were given at the same time. Patient heart rate dropped below 150. The second EKG was positive for A. fib with RVR. Cardizem 10 mg IV was given. Cardizem IV drip was restarted. Patient was a stabilized. Blood pressure is 135/75. Case discussed with admitting doctor. Will admit patient to his service. Case also discussed with patient.   Patient understood and agreed with the management. Consultations:       Consultations: -  Hospitalist Consultant: Dr. Lawson Zeng 19: We have asked for emergent assistance with regard to this patient. We have discussed the patients HPI, ROS, PE and results this far. They will come and evaluate the patient for admission. Procedures and Critical Care       Performed by: Smitha Hinds DO  PROCEDURES: IV was placed in the proximal right tibia using antiseptic technique. CRITICAL CARE NOTE :  11:14 AM  Amount of Critical Care Time: 45(minutes)    IMPENDING DETERIORATION -Cardiovascular  ASSOCIATED RISK FACTORS - Vascular Compromise  MANAGEMENT- Bedside Assessment  INTERPRETATION -  Xrays and ECG  INTERVENTIONS - hemodynamic mngmt  CASE REVIEW - Hospitalist/Intensivist  TREATMENT RESPONSE -Improved  PERFORMED BY - Self    NOTES   :  I have spent critical care time involved in lab review, consultations with specialist, family decision- making, bedside attention and documentation. This time excludes time spent in any separate billed procedures. During this entire length of time I was immediately available to the patient . Smitha Hinds DO        Disposition     Disposition: Condition improved    Admitted        Diagnosis     Clinical Impression:   1. SVT (supraventricular tachycardia) ([address]    2. Atrial fibrillation with RVR (HCC)    3. Elevated troponin I level        Attestations:    Smitha Hinds DO    Please note that this dictation was completed with [employer], the computer voice recognition software. Quite often unanticipated grammatical, syntax, homophones, and other interpretive errors are inadvertently transcribed by the computer software. Please disregard these errors. Please excuse any errors that have escaped final proofreading. Thank you.

## 2021-03-05 NOTE — Clinical Note
Status[de-identified] INPATIENT [101]   Type of Bed: Telemetry [19]   Inpatient Hospitalization Certified Necessary for the Following Reasons: 3.  Patient receiving treatment that can only be provided in an inpatient setting (further clarification in H&P documentation)   Admitting Diagnosis: Atrial fibrillation with RVR [facility]) [[id number]]   Admitting Physician: Meli Cervantes [[id number]]   Attending Physician: Meli Cervantes [[id number]]   Estimated Length of Stay: 2 Midnights   Discharge Plan[de-identified] Home with Office Follow-up

## 2021-03-05 NOTE — H&P
GENERAL GENERIC H&P/CONSULT  Presenting complaint: Altered mental status/tachycardia    Subjective: 80-year-old female with past medical history of multiple comorbidities presents to [facility] with altered mental status. Of note patient was found to be in SVT by EMS, patient received adenosine as well as Cardizem drip, of note patient is aware she is in the hospital however is unsure why, patient is able to state she is not in any acute pain. As per patient's granddaughter was POA patient is normally alert and oriented x4. Limited history and review of systems secondary to patient's clinical status    Past Medical History:   Diagnosis Date    [facility] ([facility] [address]     Hypertension     Myocardial infarction ([facility] [address]     Seizure ([facility] [address]     Stroke (cerebrum) ([facility] [address] 2019      No past surgical history on file. No pertinent past surgical history  Prior to Admission medications    Medication Sig Start Date End Date Taking? Authorizing Provider   acetaminophen (TYLENOL) 325 mg tablet Take 2 Tabs by mouth every six (6) hours as needed for Pain or Fever. 2/23/21   Jenna Etienne MD   hydrALAZINE (APRESOLINE) 50 mg tablet Take 1.5 Tabs by mouth three (3) times daily. 2/23/21   Jenna Etienne MD   levETIRAcetam (KEPPRA) 500 mg tablet Take 1 Tab by mouth two (2) times a day. 2/23/21   Jenna Etienne MD   promethazine (PHENERGAN) 25 mg tablet Take 25 mg by mouth two (2) times daily as needed for Nausea. Rickey Mccloud MD   omeprazole (PRILOSEC) 40 mg capsule Take 40 mg by mouth daily. Rickey Mccloud MD   metoprolol tartrate (LOPRESSOR) 100 mg IR tablet Take 1 Tab by mouth two (2) times a day. 12/14/20   Isabella Lyn MD     No Known Allergies   Social History     Tobacco Use    Smoking status: Never Smoker    Smokeless tobacco: Never Used   Substance Use Topics    Alcohol use: Never     Frequency: Never      No family history on file.   No pertinent paternal or maternal family history  Review of Systems   Unable to perform ROS: Mental status change       Objective:    No intake/output data recorded. No intake/output data recorded. Patient Vitals for the past 8 hrs:   BP Temp Pulse Resp SpO2   03/05/21 1230 (!) 168/63  (!) 126 19 100 %   03/05/21 1140 135/75  (!) 130 24 99 %   03/05/21 1122 (!) 155/65  (!) 124  98 %   03/05/21 1102 (!) 148/73  (!) 141     03/05/21 1045 (!) 145/71  (!) 193     03/05/21 1028 112/71 99 °F (37.2 °C) (!) 219 (!) 40 99 %     Physical Exam  Vitals signs reviewed. Constitutional:       General: She is not in acute distress. Appearance: She is normal weight. She is ill-appearing. She is not toxic-appearing or diaphoretic. HENT:      Head: Normocephalic and atraumatic. Nose: Nose normal. No congestion or rhinorrhea. Mouth/Throat:      Mouth: Mucous membranes are moist.      Pharynx: Oropharynx is clear. No oropharyngeal exudate or posterior oropharyngeal erythema. Eyes:      General: No scleral icterus. Right eye: No discharge. Left eye: No discharge. Extraocular Movements: Extraocular movements intact. Conjunctiva/sclera: Conjunctivae normal.      Pupils: Pupils are equal, round, and reactive to light. Neck:      Musculoskeletal: Normal range of motion and neck supple. No neck rigidity or muscular tenderness. Vascular: No carotid bruit. Cardiovascular:      Rate and Rhythm: Tachycardia present. Rhythm irregular. Pulses: Normal pulses. Heart sounds: Normal heart sounds. No murmur. No friction rub. No gallop. Pulmonary:      Effort: Pulmonary effort is normal. No respiratory distress. Breath sounds: Normal breath sounds. No stridor. No wheezing, rhonchi or rales. Chest:      Chest wall: No tenderness. Abdominal:      General: Abdomen is flat. Bowel sounds are normal. There is no distension. Palpations: Abdomen is soft. There is no mass. Tenderness:  There is no abdominal tenderness. There is no right CVA tenderness, left CVA tenderness, guarding or rebound. Hernia: No hernia is present. Musculoskeletal: Normal range of motion. General: No swelling, tenderness, deformity or signs of injury. Right lower leg: No edema. Left lower leg: No edema. Lymphadenopathy:      Cervical: No cervical adenopathy. Skin:     General: Skin is warm. Capillary Refill: Capillary refill takes less than 2 seconds. Coloration: Skin is not jaundiced or pale. Findings: No bruising, erythema, lesion or rash. Neurological:      General: No focal deficit present. Mental Status: She is alert. She is disoriented. Cranial Nerves: No cranial nerve deficit. Sensory: No sensory deficit. Motor: No weakness. Coordination: Coordination normal.      Gait: Gait normal.      Deep Tendon Reflexes: Reflexes normal.          Labs:    Recent Results (from the past 24 hour(s))   CBC WITH AUTOMATED DIFF    Collection Time: 03/05/21 11:00 AM   Result Value Ref Range    WBC 18.6 (H) 3.6 - 11.0 K/uL    RBC 3.63 (L) 3.80 - 5.20 M/uL    HGB 8.5 (L) 11.5 - 16.0 g/dL    HCT 28.0 (L) 35.0 - 47.0 %    MCV 77.1 (L) 80.0 - 99.0 FL    MCH 23.4 (L) 26.0 - 34.0 PG    MCHC 30.4 30.0 - 36.5 g/dL    RDW 17.9 (H) 11.5 - 14.5 %    PLATELET 286 906 - 195 K/uL    MPV 11.3 8.9 - 12.9 FL    NEUTROPHILS 94 (H) 32 - 75 %    LYMPHOCYTES 3 (L) 12 - 49 %    MONOCYTES 3 (L) 5 - 13 %    EOSINOPHILS 0 0 - 7 %    BASOPHILS 0 0 - 1 %    IMMATURE GRANULOCYTES 0 0.0 - 0.5 %    ABS. NEUTROPHILS 17.4 (H) 1.8 - 8.0 K/UL    ABS. LYMPHOCYTES 0.5 (L) 0.8 - 3.5 K/UL    ABS. MONOCYTES 0.6 0.0 - 1.0 K/UL    ABS. EOSINOPHILS 0.0 0.0 - 0.4 K/UL    ABS. BASOPHILS 0.0 0.0 - 0.1 K/UL    ABS. IMM.  GRANS. 0.1 (H) 0.00 - 0.04 K/UL    DF AUTOMATED     METABOLIC PANEL, COMPREHENSIVE    Collection Time: 03/05/21 11:00 AM   Result Value Ref Range    Sodium 143 136 - 145 mmol/L    Potassium 3.6 3.5 - 5.1 mmol/L Chloride 107 97 - 108 mmol/L    CO2 23 21 - 32 mmol/L    Anion gap 13 5 - 15 mmol/L    Glucose 123 (H) 65 - 100 mg/dL    BUN 20 6 - 20 mg/dL    Creatinine 1.46 (H) 0.55 - 1.02 mg/dL    BUN/Creatinine ratio 14 12 - 20      GFR est AA 41 (L) >60 ml/min/1.73m2    GFR est non-AA 34 (L) >60 ml/min/1.73m2    Calcium 9.4 8.5 - 10.1 mg/dL    Bilirubin, total 5.8 (H) 0.2 - 1.0 mg/dL    AST (SGOT) 144 (H) 15 - 37 U/L    ALT (SGPT) 42 12 - 78 U/L    Alk. phosphatase 616 (H) 45 - 117 U/L    Protein, total 7.9 6.4 - 8.2 g/dL    Albumin 2.1 (L) 3.5 - 5.0 g/dL    Globulin 5.8 (H) 2.0 - 4.0 g/dL    A-G Ratio 0.4 (L) 1.1 - 2.2     TROPONIN I    Collection Time: 03/05/21 11:00 AM   Result Value Ref Range    Troponin-I, Qt. 0.07 (H) <0.05 ng/mL   MAGNESIUM    Collection Time: 03/05/21 11:00 AM   Result Value Ref Range    Magnesium 1.7 1.6 - 2.4 mg/dL       ECG: unchanged from previous tracings   Chest X-Ray: normal    Assessment:  Active Problems:    Hypokalemia (12/14/2020)      Atrial fibrillation with RVR ([address] (3/5/2021)      Hypertension (3/5/2021)      Hypomagnesemia (3/5/2021)        Plan: Altered mental status/metabolic encephalopathypatient presents with above-mentioned symptomatology was found to have altered mental status/metabolic encephalopathy based on patient's clinical presentation could be multifactorial including given leukocytosis underlying infection, patient does not meet sepsis criteria  Obtain urinalysis  Follow-up blood cultures  CT head to rule out any acute intracranial pathologies  Ceftriaxone for antibiotic coverage, discontinue if urinalysis negative    Atrial fibrillation with RVRpatient presented with what was initially thought to be SVT however found to be in atrial fibrillation with RVR  Obtain TSH  Cardizem gtt.   Start heparin GTT once patient CT head is negative for any acute intracranial pathology  Cardiac enzymes every 6x3  Transthoracic echo  Cardiology consult    Hypokalemiareplete potassium and recheck potassium    Hypomagnesemiareplete magnesium and recheck magnesium    Hypertensioncontinue home antihypertensive medications    ProphylaxisHeparin GTT  FENcardiac diet, replete potassium and magnesium  DNR/DNI, patient's power of  is granddaughter Formerly named [facility] [phone], admitted to telemetry further management    Signed:   Ashley Ludwig MD 3/5/2021

## 2021-03-06 PROBLEM — R78.81 BACTEREMIA: Status: ACTIVE | Noted: 2021-03-06

## 2021-03-06 PROBLEM — D64.9 ANEMIA: Status: ACTIVE | Noted: 2021-03-06

## 2021-03-06 LAB
ALBUMIN SERPL-MCNC: 1.8 G/DL (ref 3.5–5)
ALBUMIN/GLOB SERPL: 0.4 {RATIO} (ref 1.1–2.2)
ALP SERPL-CCNC: 464 U/L (ref 45–117)
ALT SERPL-CCNC: 37 U/L (ref 12–78)
ANION GAP SERPL CALC-SCNC: 5 MMOL/L (ref 5–15)
AST SERPL W P-5'-P-CCNC: 90 U/L (ref 15–37)
BASOPHILS # BLD: 0 K/UL (ref 0–0.1)
BASOPHILS # BLD: 0 K/UL (ref 0–0.1)
BASOPHILS NFR BLD: 0 % (ref 0–1)
BASOPHILS NFR BLD: 0 % (ref 0–1)
BILIRUB SERPL-MCNC: 2.6 MG/DL (ref 0.2–1)
BUN SERPL-MCNC: 26 MG/DL (ref 6–20)
BUN/CREAT SERPL: 25 (ref 12–20)
CA-I BLD-MCNC: 8.4 MG/DL (ref 8.5–10.1)
CHLORIDE SERPL-SCNC: 108 MMOL/L (ref 97–108)
CO2 SERPL-SCNC: 27 MMOL/L (ref 21–32)
CREAT SERPL-MCNC: 1.04 MG/DL (ref 0.55–1.02)
DIFFERENTIAL METHOD BLD: ABNORMAL
DIFFERENTIAL METHOD BLD: ABNORMAL
EOSINOPHIL # BLD: 0.1 K/UL (ref 0–0.4)
EOSINOPHIL # BLD: 0.2 K/UL (ref 0–0.4)
EOSINOPHIL NFR BLD: 0 % (ref 0–7)
EOSINOPHIL NFR BLD: 1 % (ref 0–7)
ERYTHROCYTE [DISTWIDTH] IN BLOOD BY AUTOMATED COUNT: 17.6 % (ref 11.5–14.5)
ERYTHROCYTE [DISTWIDTH] IN BLOOD BY AUTOMATED COUNT: 17.7 % (ref 11.5–14.5)
GLOBULIN SER CALC-MCNC: 4.6 G/DL (ref 2–4)
GLUCOSE BLD STRIP.AUTO-MCNC: 145 MG/DL (ref 65–100)
GLUCOSE SERPL-MCNC: 144 MG/DL (ref 65–100)
HCT VFR BLD AUTO: 21.2 % (ref 35–47)
HCT VFR BLD AUTO: 24.3 % (ref 35–47)
HGB BLD-MCNC: 6.4 G/DL (ref 11.5–16)
HGB BLD-MCNC: 7.2 G/DL (ref 11.5–16)
IMM GRANULOCYTES # BLD AUTO: 0 K/UL
IMM GRANULOCYTES # BLD AUTO: 0.1 K/UL (ref 0–0.04)
IMM GRANULOCYTES NFR BLD AUTO: 0 %
IMM GRANULOCYTES NFR BLD AUTO: 0 % (ref 0–0.5)
LYMPHOCYTES # BLD: 0.5 K/UL (ref 0.8–3.5)
LYMPHOCYTES # BLD: 0.6 K/UL (ref 0.8–3.5)
LYMPHOCYTES NFR BLD: 3 % (ref 12–49)
LYMPHOCYTES NFR BLD: 3 % (ref 12–49)
MAGNESIUM SERPL-MCNC: 1.9 MG/DL (ref 1.6–2.4)
MCH RBC QN AUTO: 22.6 PG (ref 26–34)
MCH RBC QN AUTO: 23.1 PG (ref 26–34)
MCHC RBC AUTO-ENTMCNC: 29.6 G/DL (ref 30–36.5)
MCHC RBC AUTO-ENTMCNC: 30.2 G/DL (ref 30–36.5)
MCV RBC AUTO: 76.4 FL (ref 80–99)
MCV RBC AUTO: 76.5 FL (ref 80–99)
MONOCYTES # BLD: 0.9 K/UL (ref 0–1)
MONOCYTES # BLD: 1.1 K/UL (ref 0–1)
MONOCYTES NFR BLD: 5 % (ref 5–13)
MONOCYTES NFR BLD: 6 % (ref 5–13)
NEUTS SEG # BLD: 16.8 K/UL (ref 1.8–8)
NEUTS SEG # BLD: 16.8 K/UL (ref 1.8–8)
NEUTS SEG NFR BLD: 90 % (ref 32–75)
NEUTS SEG NFR BLD: 92 % (ref 32–75)
PERFORMED BY, TECHID: ABNORMAL
PLATELET # BLD AUTO: 256 K/UL (ref 150–400)
PLATELET # BLD AUTO: 294 K/UL (ref 150–400)
PMV BLD AUTO: 10.8 FL (ref 8.9–12.9)
PMV BLD AUTO: 11.2 FL (ref 8.9–12.9)
POTASSIUM SERPL-SCNC: 3.3 MMOL/L (ref 3.5–5.1)
PROT SERPL-MCNC: 6.4 G/DL (ref 6.4–8.2)
RBC # BLD AUTO: 2.77 M/UL (ref 3.8–5.2)
RBC # BLD AUTO: 3.18 M/UL (ref 3.8–5.2)
RBC MORPH BLD: ABNORMAL
SODIUM SERPL-SCNC: 140 MMOL/L (ref 136–145)
TROPONIN I SERPL-MCNC: 0.08 NG/ML
TSH SERPL DL<=0.05 MIU/L-ACNC: 0.82 UIU/ML (ref 0.36–3.74)
WBC # BLD AUTO: 18.2 K/UL (ref 3.6–11)
WBC # BLD AUTO: 18.7 K/UL (ref 3.6–11)

## 2021-03-06 PROCEDURE — 74011000250 HC RX REV CODE- 250: Performed by: INTERNAL MEDICINE

## 2021-03-06 PROCEDURE — 74011250637 HC RX REV CODE- 250/637: Performed by: INTERNAL MEDICINE

## 2021-03-06 PROCEDURE — 82962 GLUCOSE BLOOD TEST: CPT

## 2021-03-06 PROCEDURE — 83735 ASSAY OF MAGNESIUM: CPT

## 2021-03-06 PROCEDURE — 74011000258 HC RX REV CODE- 258: Performed by: HOSPITALIST

## 2021-03-06 PROCEDURE — 86900 BLOOD TYPING SEROLOGIC ABO: CPT

## 2021-03-06 PROCEDURE — 74011250637 HC RX REV CODE- 250/637: Performed by: HOSPITALIST

## 2021-03-06 PROCEDURE — 74011250637 HC RX REV CODE- 250/637: Performed by: NURSE PRACTITIONER

## 2021-03-06 PROCEDURE — 74011250636 HC RX REV CODE- 250/636: Performed by: INTERNAL MEDICINE

## 2021-03-06 PROCEDURE — 85025 COMPLETE CBC W/AUTO DIFF WBC: CPT

## 2021-03-06 PROCEDURE — 30233N1 TRANSFUSION OF NONAUTOLOGOUS RED BLOOD CELLS INTO PERIPHERAL VEIN, PERCUTANEOUS APPROACH: ICD-10-PCS | Performed by: HOSPITALIST

## 2021-03-06 PROCEDURE — 80053 COMPREHEN METABOLIC PANEL: CPT

## 2021-03-06 PROCEDURE — 65610000006 HC RM INTENSIVE CARE

## 2021-03-06 PROCEDURE — 86923 COMPATIBILITY TEST ELECTRIC: CPT

## 2021-03-06 PROCEDURE — 36430 TRANSFUSION BLD/BLD COMPNT: CPT

## 2021-03-06 PROCEDURE — 65270000029 HC RM PRIVATE

## 2021-03-06 PROCEDURE — 77010033678 HC OXYGEN DAILY

## 2021-03-06 PROCEDURE — P9016 RBC LEUKOCYTES REDUCED: HCPCS

## 2021-03-06 PROCEDURE — 74011250636 HC RX REV CODE- 250/636: Performed by: HOSPITALIST

## 2021-03-06 PROCEDURE — 36415 COLL VENOUS BLD VENIPUNCTURE: CPT

## 2021-03-06 RX ORDER — DILTIAZEM HYDROCHLORIDE 30 MG/1
30 TABLET, FILM COATED ORAL
Status: DISCONTINUED | OUTPATIENT
Start: 2021-03-06 | End: 2021-03-07

## 2021-03-06 RX ORDER — DILTIAZEM HCL/D5W 125 MG/125
0-15 PLASTIC BAG, INJECTION (ML) INTRAVENOUS
Status: DISCONTINUED | OUTPATIENT
Start: 2021-03-06 | End: 2021-03-07

## 2021-03-06 RX ORDER — SODIUM CHLORIDE 9 MG/ML
250 INJECTION, SOLUTION INTRAVENOUS AS NEEDED
Status: DISCONTINUED | OUTPATIENT
Start: 2021-03-06 | End: 2021-03-11 | Stop reason: HOSPADM

## 2021-03-06 RX ORDER — DOCUSATE SODIUM 100 MG/1
100 CAPSULE, LIQUID FILLED ORAL
COMMUNITY

## 2021-03-06 RX ORDER — HYDROMORPHONE HYDROCHLORIDE 1 MG/ML
0.5 INJECTION, SOLUTION INTRAMUSCULAR; INTRAVENOUS; SUBCUTANEOUS
Status: DISPENSED | OUTPATIENT
Start: 2021-03-06 | End: 2021-03-08

## 2021-03-06 RX ORDER — POTASSIUM CHLORIDE 1.5 G/1.77G
20 POWDER, FOR SOLUTION ORAL 2 TIMES DAILY WITH MEALS
Status: DISCONTINUED | OUTPATIENT
Start: 2021-03-06 | End: 2021-03-11 | Stop reason: HOSPADM

## 2021-03-06 RX ORDER — OXYCODONE AND ACETAMINOPHEN 5; 325 MG/1; MG/1
1 TABLET ORAL
COMMUNITY

## 2021-03-06 RX ORDER — TRAZODONE HYDROCHLORIDE 50 MG/1
50 TABLET ORAL
COMMUNITY

## 2021-03-06 RX ADMIN — Medication 20 MG/HR: at 00:41

## 2021-03-06 RX ADMIN — LEVETIRACETAM 500 MG: 500 TABLET ORAL at 20:02

## 2021-03-06 RX ADMIN — DILTIAZEM HYDROCHLORIDE 30 MG: 30 TABLET, FILM COATED ORAL at 10:14

## 2021-03-06 RX ADMIN — LEVETIRACETAM 500 MG: 500 TABLET ORAL at 10:05

## 2021-03-06 RX ADMIN — POTASSIUM CHLORIDE 20 MEQ: 1.5 FOR SOLUTION ORAL at 19:50

## 2021-03-06 RX ADMIN — HYDROMORPHONE HYDROCHLORIDE 0.5 MG: 1 INJECTION, SOLUTION INTRAMUSCULAR; INTRAVENOUS; SUBCUTANEOUS at 13:23

## 2021-03-06 RX ADMIN — ONDANSETRON 4 MG: 2 INJECTION INTRAMUSCULAR; INTRAVENOUS at 12:42

## 2021-03-06 RX ADMIN — PIPERACILLIN AND TAZOBACTAM 3.38 G: 3; .375 INJECTION, POWDER, LYOPHILIZED, FOR SOLUTION INTRAVENOUS at 13:23

## 2021-03-06 RX ADMIN — DILTIAZEM HYDROCHLORIDE 30 MG: 30 TABLET, FILM COATED ORAL at 16:17

## 2021-03-06 RX ADMIN — PIPERACILLIN AND TAZOBACTAM 3.38 G: 3; .375 INJECTION, POWDER, LYOPHILIZED, FOR SOLUTION INTRAVENOUS at 21:42

## 2021-03-06 RX ADMIN — HYDROMORPHONE HYDROCHLORIDE 0.5 MG: 1 INJECTION, SOLUTION INTRAMUSCULAR; INTRAVENOUS; SUBCUTANEOUS at 19:23

## 2021-03-06 RX ADMIN — PANTOPRAZOLE SODIUM 40 MG: 40 TABLET, DELAYED RELEASE ORAL at 10:05

## 2021-03-06 RX ADMIN — Medication 5 MG/HR: at 13:23

## 2021-03-06 RX ADMIN — ACETAMINOPHEN 650 MG: 325 TABLET, FILM COATED ORAL at 12:56

## 2021-03-06 NOTE — PROGRESS NOTES
Four eyes skin assessment completed with [place] Filter. Skin warm dry and intact. Bilateral pitting edema 2+ noted in lower extremities.

## 2021-03-06 NOTE — ED NOTES
TRANSFER - OUT REPORT:    Verbal report given to SOFÍA Evans on Ángel Weeks  being transferred to ICU, 263. Report consisted of patients Situation, Background, Assessment and   Recommendations(SBAR). Information from the following report(s) SBAR, ED Summary, [facility] and Recent Results was reviewed with the receiving nurse. Lines:   Peripheral IV 03/05/21 Anterior;Right External jugular (Active)       Peripheral IV 03/05/21 Right Arm (Active)       Intraosseous Line 03/05/21 Right;Tibia (Active)        Opportunity for questions and clarification was provided. normal...

## 2021-03-06 NOTE — PROGRESS NOTES
Spiritual Care Assessment/Progress Note  [facility]      NAME: Claudia Schumacher      MRN: [id number]  AGE: 80 y.o.  SEX: female  Scientology Affiliation: Scientologist   Language: English     3/6/2021     Total Time (in minutes): 10     Spiritual Assessment begun in [address] ICU through conversation with:         [x]Patient        [] Family    [] Friend(s)        Reason for Consult: Initial/Spiritual assessment, critical care     Spiritual beliefs: (Please include comment if needed)     [x] Identifies with a lin tradition:   Scientologist      [] Supported by a lin community:            [] Claims no spiritual orientation:           [] Seeking spiritual identity:                [] Adheres to an individual form of spirituality:           [] Not able to assess:                           Identified resources for coping:      [x] Prayer                               [] Music                  [] Guided Imagery     [] Family/friends                 [] Pet visits     [] Devotional reading                         [] Unknown     [] Other:                                               Interventions offered during this visit: (See comments for more details)    Patient Interventions: Initial/Spiritual assessment, Critical care, Prayer (actual), Normalization of emotional/spiritual concerns           Plan of Care:     [] Support spiritual and/or cultural needs    [] Support AMD and/or advance care planning process      [] Support grieving process   [] Coordinate Rites and/or Rituals    [] Coordination with community clergy   [] No spiritual needs identified at this time   [] Detailed Plan of Care below (See Comments)  [] Make referral to Music Therapy  [] Make referral to Pet Therapy     [] Make referral to Addiction services  [] Make referral to [facility]  [] Make referral to Spiritual Care Partner  [] No future visits requested        [x] Follow up upon further referrals     Comments:  visited patient in , patient was awake and aware, stated she is not doing too good.  offered patient an opportunity to share her story and patient said she didn't think a  could help.  provided prayer as requested, advised patient of chaplains availability for follow up upon further referrals.      Visited by Deric Ennis, [address] [facility], [facility]    can be contacted by calling  and requesting  on call

## 2021-03-06 NOTE — PROGRESS NOTES
Hospitalist Progress Note               Daily Progress Note: 3/6/2021      Subjective: The patient is seen for follow  up.   66-year-old female with a history of CVA and left-sided hemiparesis, recently diagnosed with pancreatic cancer with metastatic to the lymph nodes, was on hospice care at home and was found to be altered mental status. On evaluation in ER patient was found to be in A. fib with rapid ventricular rate and was started on Cardizem. Patient is awake and alert but unable to give much history. Today morning patient is found to have drop in her hemoglobin. We will repeat CBC to confirm and give transfusion  Patient's blood culture was positive for gram-negative rods and 4 of 4 bottles per verbal report from lab. Medications reviewed  Current Facility-Administered Medications   Medication Dose Route Frequency    0.9% sodium chloride infusion 250 mL  250 mL IntraVENous PRN    piperacillin-tazobactam (ZOSYN) 3.375 g in 0.9% sodium chloride (MBP/ADV) 100 mL MBP  3.375 g IntraVENous Q6H    dilTIAZem IR (CARDIZEM) tablet 30 mg  30 mg Oral TIDAC    hydrALAZINE (APRESOLINE) tablet 75 mg  75 mg Oral TID PRN    levETIRAcetam (KEPPRA) tablet 500 mg  500 mg Oral BID    promethazine (PHENERGAN) tablet 25 mg  25 mg Oral Q4H PRN    acetaminophen (TYLENOL) tablet 650 mg  650 mg Oral Q6H PRN    Or    acetaminophen (TYLENOL) suppository 650 mg  650 mg Rectal Q6H PRN    polyethylene glycol (MIRALAX) packet 17 g  17 g Oral DAILY PRN    promethazine (PHENERGAN) tablet 12.5 mg  12.5 mg Oral Q6H PRN    Or    ondansetron (ZOFRAN) injection 4 mg  4 mg IntraVENous Q6H PRN    pantoprazole (PROTONIX) tablet 40 mg  40 mg Oral ACB       Review of Systems:   Review of systems not obtained due to patient factors.     Objective:   Physical Exam:     Visit Vitals  BP (!) 132/53 (BP 1 Location: Left upper arm, BP Patient Position: At rest)   Pulse (!) 105   Temp 98.6 °F (37 °C)   Resp 14   Ht 5' 4.96\" (1.65 m)   Wt 90.7 kg (199 lb 15.3 oz)   SpO2 100%   BMI 33.31 kg/m²    O2 Flow Rate (L/min): 4 l/min O2 Device: Nasal cannula    Temp (24hrs), Av.8 °F (37.1 °C), Min:98.6 °F (37 °C), Max:99 °F (37.2 °C)    No intake/output data recorded. No intake/output data recorded. PHYSICAL EXAM:  General: Chronically sick looking. Alert and awake. HEENT: Sclerae anicteric. Extra-occular muscles are intact. No oral ulcers. No ENT discharge. The neck is supple. Cardiovascular:Irregularly irregular, on cardizem drip  Respiratory: Comfortable breathing with no accessory muscle use. Clear breath sounds with no wheezes, rales, or rhonchi. GI: Abdomen nondistended, soft, and nontender. Rectal: Deferred   Musculoskeletal: Patient noticed to have eft hemiparesis. She complains of pain in left upper extremity on passive movement  Neurological: Left hemiparesis, Awake and alert, slow to respond,   Psychiatric: calm and cooperative  . Data Review:       Recent Days:  Recent Labs     21  0515 21  1100   WBC 18.2* 18.6*   HGB 6.4* 8.5*   HCT 21.2* 28.0*    363     Recent Labs     21  0515 21  1100    143   K 3.3* 3.6    107   CO2 27 23   * 123*   BUN 26* 20   CREA 1.04* 1.46*   CA 8.4* 9.4   MG 1.9 1.7   ALB 1.8* 2.1*   TBILI 2.6* 5.8*   ALT 37 42     No results for input(s): PH, PCO2, PO2, HCO3, FIO2 in the last 72 hours. Results     Procedure Component Value Units Date/Time    CULTURE, BLOOD, PAIRED [[id number]] Collected: 21 1215    Order Status: Completed Specimen: Blood Updated: 21 1413          CT HEAD WO CONT   Final Result   1. Cerebral atrophy and ischemic microvascular disease of white matter. No acute   intracranial abnormality.    2. Remote infarct evident in posterior division right middle cerebral artery   distribution            XR CHEST PORT   Final Result             Assessment/     Problem List:  Hospital Problems  Date Reviewed: 2021          Codes Class Noted POA    Anemia ICD-10-CM: D64.9  ICD-9-CM: 285.9  3/6/2021 Unknown        Bacteremia ICD-10-CM: R78.81  ICD-9-CM: 790.7  3/6/2021 Unknown        Atrial fibrillation with [facility]VR ([facility] ICD-10-CM: I48.91  ICD-9-CM: 427.31  3/5/2021 Unknown        Hypertension ICD-10-CM: I10  ICD-9-CM: 401.9  3/5/2021 Unknown        Hypomagnesemia ICD-10-CM: E83.42  ICD-9-CM: 275.2  3/5/2021 Unknown        Pancreatic mass ICD-10-CM: K86.89  ICD-9-CM: 577.8  2/16/2021 Yes        Hypokalemia ICD-10-CM: E87.6  ICD-9-CM: 276.8  12/14/2020 Unknown        History of CVA (cerebrovascular accident) ICD-10-CM: Z86.73  ICD-9-CM: V12.54  12/14/2020 Yes        Left hemiplegia ([facility] ICD-10-CM: G81.94  ICD-9-CM: 342.90  12/14/2020 Yes    Overview Signed 12/14/2020  5:24 PM by Tena Michele MD     From prior cva             Seizure [facility]) ICD-10-CM: R56.9  ICD-9-CM: 780.39  12/2/2020 Yes                     Plan:  27-year-old female was brought to the hospital with a complaint of change in her mental status and was found to have atrial fibrillation with rapid ventricular rate  1. Sepsis: Patient blood culture is positive for gram-negative rods. Will escalate patient's antibiotic to Zosyn. We will follow up on complete report of blood cultures. 2.  Atrial fibrillation with rapid ventricular rate: Patient is currently on Cardizem drip. Patient will be switched to p.o. Cardizem. Controlled. Hold off on anticoagulation as patient has shown drop in her H&H.    3.  Anemia: Will check patient's H&H again and transfuse 2 units if patient still have low H&H. Patient is planned to go to hospice once stabilized. We will hold off on aggressive investigation regarding this as patient already have a terminal diagnosis of metastatic adenocarcinoma pancreatic cancer. 4.  Hypomagnesemia and a hypokalemia: Patient was given supplements. Potassium is still low magnesium has improved. Will give further supplements as well.   5.  Adenocarcinoma of pancreatic head: Patient will be discharged to hospice once stabilized. 6.  History of seizure disorder: Continue patient on Keppra  7. History of CVA: We will continue to patient blood monitor blood pressure of the patient. Anticoagulation and antiplatelets on hold as patient has anemia and high risk of GI bleed. Care Plan discussed with: Patient/Family and Nurse    Total time spent with patient: 45 minutes.   Dispo: Discharge to hospice once stable and family agrees    Winsome Durant MD

## 2021-03-06 NOTE — ACP (ADVANCE CARE PLANNING)
Reason for Admission:                       RUR Score:    18%             PCP: First and Last name:   Josias Conklin MD     Name of Practice:    Are you a current patient: Yes/No:    Approximate date of last visit:    Can you participate in a virtual visit if needed:     Do you (patient/family) have any concerns for transition/discharge? Plan for utilizing home health:       Current Advanced Directive/Advance Care Plan:  DNR      Healthcare Decision Maker:   Click here to complete [place] including selection of the Healthcare Decision Maker Relationship (ie \"Primary\")            Primary Decision Maker (Active): lalo wyatt [facility] - [phone]    Transition of Care Plan:   Back home with Hospice ( [facility]). Documentation uploaded in [employer]. Spoke with the grandchild, [place]. The patient lives with her daughter and granddaughter. She is bed bound and is on hospice at home. Interested to back to Hospice on discharge based on what the POA mentioned.   Will follow

## 2021-03-06 NOTE — ED NOTES
TRANSFER - OUT REPORT: 
 
Verbal report given to Marlene Parsons RN on [employer]  being transferred to  [id number], 4E. Report consisted of patients Situation, Background, Assessment and  
Recommendations(SBAR). Information from the following report(s) SBAR, ED Summary, [facility] and Recent Results was reviewed with the receiving nurse. Lines:  
Peripheral IV 03/05/21 Anterior;Right External jugular (Active) Peripheral IV 03/05/21 Right Arm (Active) Intraosseous Line 03/05/21 Right;Tibia (Active) Opportunity for questions and clarification was provided.

## 2021-03-06 NOTE — CONSULTS
Consult    NAME: Nestor Nxi   :  9/3/1931   MRN:  [id number]     Date/Time:  3/6/2021 8:40 AM    Patient PCP: Erlin Corado MD  ________________________________________________________________________     Problem List:   1. AMS - metabolic encephalopathy  2. Atrial fibrillation with RVR  3. Hypokalemia  4. Hypomagnesemia  5. HTN        Assessment/Plan:     1. Patient observed resting in bed with eyes opened. Confused and disoriented to person, place and time. No acute distress noted. No complaints verbalized  2. Patient is a poor historian, review of chart reveals past medical history of seizures, HTN, breast cancer, MI, and stroke. 3. Echocardiogram 3/5/21 EF of 72%, moderate to severe aortic valve stenosis present. 4. Hgb of 6.4 - to receive 2 units of PRBC's  5. Atrial fibrillation on telemetry at controlled rate. No acute cardiovascular events overnight. 6. Vital signs remain hemodynamically stable. 7. Troponin elevated at 0.20  8. Will transition patient from IV to po cardizem with goal heart rate of <100bpm.  9. No invasive cardiovascular procedures at this time. Will continue conservative cardiovascular management and follow patient during hospitalization. Thank you for allowing us to care for Ms. Wilcox.         []        High complexity decision making was performed      Patient Active Problem List   Diagnosis Code    [facility] ([facility] R56.9    Hypertensive urgency I16.0    Hypokalemia E87.6    History of CVA (cerebrovascular accident) Z86.73    Left hemiplegia (HCC) G81.94    Oropharyngeal dysphagia R13.12    Lab test negative for COVID-19 virus Z03.818    Asthenia R53.1    Pancreatic mass K86.89    Atrial fibrillation with RVR (HCC) I48.91    Hypertension I10    Hypomagnesemia E83.42        Subjective:   CHIEF COMPLAINT: no complaints expressed    HISTORY OF PRESENT ILLNESS:   Ms. Sammy Encarnacion is a 80year old female who presented to the Emergency Department with altered mentation. According to review of chart as patient is a poor historian, past medical history of breast cancer, HTN, MI, seizure, and stroke. The patient was noted to be found in superventricular tachycardia by EMS staff and received cardizem and adenosine. Patient currently resides with granddaughter who is also her POA. Cardiology consulted for atrial fibrillation with RVR. We were asked to consult for work up and evaluation of the above problems. Past Medical History:   Diagnosis Date    [facility]     Hypertension     Myocardial infarction ([facility]     Seizure ([facility]     Stroke (cerebrum) ([facility] 2019      No past surgical history on file. No Known Allergies   Meds:  See below  Social History     Tobacco Use    Smoking status: Never Smoker    Smokeless tobacco: Never Used   Substance Use Topics    Alcohol use: Never     Frequency: Never      No family history on file. REVIEW OF SYSTEMS:     []         Unable to obtain  ROS due to ---    [x]         Total of 12 systems reviewed as follows: AMS     Constitutional: negative fever, negative chills, negative weight loss  Eyes:   negative visual changes  ENT:   negative sore throat, tongue or lip swelling  Respiratory:  negative cough, negative dyspnea  Cards:  negative for chest pain, palpitations, lower extremity edema  GI:   negative for nausea, vomiting, diarrhea, and abdominal pain  Genitourinary: negative for frequency, dysuria  Integument:  negative for rash   Hematologic:  negative for easy bruising and gum/nose bleeding  Musculoskel: negative for myalgias,  back pain  Neurological:  negative for headaches, dizziness, vertigo, weakness  Behavl/Psych: negative for feelings of anxiety, depression     Pertinent Positives include : AMS, patient denies any complaints. No acute distress noted    Objective:      Physical Exam:    Last 24hrs VS reviewed since prior progress note.  Most recent are:    Visit Vitals  BP (!) 132/53 (BP 1 Location: Left upper arm, BP Patient Position: At rest)   Pulse (!) 105   Temp 98.6 °F (37 °C)   Resp 14   Ht 5' 4.96\" (1.65 m)   Wt 90.7 kg (199 lb 15.3 oz)   SpO2 100%   BMI 33.31 kg/m²     No intake or output data in the 24 hours ending 03/06/21 0922     General Appearance: Well developed, well nourished, confused & oriented x 3,    no acute distress. Ears/Nose/Mouth/Throat: Pupils equal and round, Hearing grossly normal.  Neck: Supple. JVP within normal limits. Carotids good upstrokes, with no bruit. Chest: Lungs clear to auscultation bilaterally. Cardiovascular: irregular rate and rhythm, + 2/6 murmur, no rubs, no gallops. Abdomen: Soft, non-tender, bowel sounds are active. No organomegaly. Extremities: +1 non-pitting edema noted to bilateral lower extremities. Femoral pulses +2, Distal Pulses weak bilaterally. Skin: Warm and dry. Neuro: CN II-XII grossly intact, Strength and sensation grossly intact. Data:      Telemetry: atrial fibrillation    EKG:  []  No new EKG for review. Echo Findings    Left Ventricle Normal systolic function (ejection fraction normal). Small left ventricle. Severe concentric hypertrophy. The calculated EF is 72%. There is mild (grade 1) left ventricular diastolic dysfunction. Elevated left ventricular diastolic pressure. Left Atrium Normal cavity size. Right Ventricle Normal global systolic function. Moderately dilated right ventricle. Right Atrium Mildly dilated right atrium. Interatrial Septum Interatrial septum not well visualized   Aortic Valve There is leaflet calcification. Aortic valve area is 1 cm2. There is moderate to severe aortic stenosis. Trace aortic valve regurgitation. Mitral Valve Normal valve structure and no stenosis. Trace regurgitation. Tricuspid Valve Normal valve structure and no stenosis. Trace regurgitation. Pulmonic Valve Pulmonic valve not well visualized. Aorta The aorta was not well visualized.    Pulmonary Artery Pulmonary arteries not well visualized. IVC/Hepatic Veins Normal structure. Normal central venous pressure (3 mmHg); IVC diameter is less than 21 mm and collapses more than 50% with respiration. Pericardium Normal pericardium and no evidence of pericardial effusion. Results for Lv Mckeon (MRN [id number]) as of 3/6/2021 09:26   Ref. Range 3/6/2021 05:15   WBC Latest Ref Range: 3.6 - 11.0 K/uL 18.2 (H)   RBC Latest Ref Range: 3.80 - 5.20 M/uL 2.77 (L)   HGB Latest Ref Range: 11.5 - 16.0 g/dL 6.4 (L)   HCT Latest Ref Range: 35.0 - 47.0 % 21.2 (L)   MCV Latest Ref Range: 80.0 - 99.0 FL 76.5 (L)   MCH Latest Ref Range: 26.0 - 34.0 PG 23.1 (L)   MCHC Latest Ref Range: 30.0 - 36.5 g/dL 30.2   RDW Latest Ref Range: 11.5 - 14.5 % 17.6 (H)   PLATELET Latest Ref Range: 150 - 400 K/uL 256   MPV Latest Ref Range: 8.9 - 12.9 FL 10.8   NEUTROPHILS Latest Ref Range: 32 - 75 % 92 (H)   LYMPHOCYTES Latest Ref Range: 12 - 49 % 3 (L)   MONOCYTES Latest Ref Range: 5 - 13 % 5   EOSINOPHILS Latest Ref Range: 0 - 7 % 0   BASOPHILS Latest Ref Range: 0 - 1 % 0   IMMATURE GRANULOCYTES Latest Ref Range: 0.0 - 0.5 % 0   DF Latest Units:   AUTOMATED   ABS. NEUTROPHILS Latest Ref Range: 1.8 - 8.0 K/UL 16.8 (H)   ABS. IMM. GRANS. Latest Ref Range: 0.00 - 0.04 K/UL 0.1 (H)   ABS. LYMPHOCYTES Latest Ref Range: 0.8 - 3.5 K/UL 0.5 (L)   ABS. MONOCYTES Latest Ref Range: 0.0 - 1.0 K/UL 0.9   ABS. EOSINOPHILS Latest Ref Range: 0.0 - 0.4 K/UL 0.1   ABS. BASOPHILS Latest Ref Range: 0.0 - 0.1 K/UL 0.0   Results for Lv Mckeon (MRN [id number]) as of 3/6/2021 09:26   Ref.  Range 3/6/2021 05:15   Sodium Latest Ref Range: 136 - 145 mmol/L 140   Potassium Latest Ref Range: 3.5 - 5.1 mmol/L 3.3 (L)   Chloride Latest Ref Range: 97 - 108 mmol/L 108   CO2 Latest Ref Range: 21 - 32 mmol/L 27   Anion gap Latest Ref Range: 5 - 15 mmol/L 5   Glucose Latest Ref Range: 65 - 100 mg/dL 144 (H)   BUN Latest Ref Range: 6 - 20 mg/dL 26 (H)   Creatinine Latest Ref Range: 0.55 - 1.02 mg/dL 1.04 (H)   BUN/Creatinine ratio Latest Ref Range: 12 - 20   25 (H)   Calcium Latest Ref Range: 8.5 - 10.1 mg/dL 8.4 (L)   Magnesium Latest Ref Range: 1.6 - 2.4 mg/dL 1.9   GFR est non-AA Latest Ref Range: >60 ml/min/1.73m2 50 (L)   GFR est AA Latest Ref Range: >60 ml/min/1.73m2 >60   Bilirubin, total Latest Ref Range: 0.2 - 1.0 mg/dL 2.6 (H)   Protein, total Latest Ref Range: 6.4 - 8.2 g/dL 6.4   Albumin Latest Ref Range: 3.5 - 5.0 g/dL 1.8 (L)   Globulin Latest Ref Range: 2.0 - 4.0 g/dL 4.6 (H)   A-G Ratio Latest Ref Range: 1.1 - 2.2   0.4 (L)   ALT Latest Ref Range: 12 - 78 U/L 37   AST Latest Ref Range: 15 - 37 U/L 90 (H)   Alk. phosphatase Latest Ref Range: 45 - 117 U/L 464 (H)      Current IP Meds  Comment  Expand  Hide  (From admission, onward)     Last edited by  on  at    Start   Stop Status Route Frequency Ordered   03/06/21 1200  piperacillin-tazobactam (ZOSYN) 3.375 g in 0.9% sodium chloride (MBP/ADV) 100 mL MBP    Discontinue    -- Sent IV EVERY 6 HOURS 03/06/21 0933   03/06/21 0818  0.9% sodium chloride infusion 250 mL    Discontinue    -- Dispensed IV AS NEEDED 03/06/21 0821   03/06/21 0730  pantoprazole (PROTONIX) tablet 40 mg    Discontinue    -- Verified PO DAILY BEFORE BREAKFAST 03/05/21 1300   03/05/21 2100  levETIRAcetam (KEPPRA) tablet 500 mg    Discontinue Note to Pharmacy: OP SIG:Take 1 Tab by mouth two. ..     -- Dispensed PO 2 TIMES DAILY 03/05/21 1218   03/05/21 1700  digoxin (LANOXIN) injection 250 mcg      03/05 1705 Completed IV NOW 03/05/21 1659   03/05/21 1219  acetaminophen (TYLENOL) tablet 650 mg (acetaminophen PO or MS panel)    Discontinue   \"Or\" Linked Group Details    -- Verified PO EVERY 6 HOURS AS NEEDED 03/05/21 1219   03/05/21 1219  acetaminophen (TYLENOL) suppository 650 mg (acetaminophen PO or MS panel)    Discontinue   \"Or\" Linked Group Details    -- Verified RE EVERY 6 HOURS AS NEEDED 03/05/21 1219   03/05/21 1219  polyethylene glycol (MIRALAX) packet 17 g (Bowel Management - 1st Line PRN)    Discontinue    -- Verified PO DAILY PRN 03/05/21 1219   03/05/21 1219  promethazine (PHENERGAN) tablet 12.5 mg (promethazine (PHENERGAN) PO or ondansetron (ZOFRAN) IV)    Discontinue   \"Or\" Linked Group Details    -- Verified PO EVERY 6 HOURS AS NEEDED 03/05/21 1219   03/05/21 1219  ondansetron (ZOFRAN) injection 4 mg (promethazine (PHENERGAN) PO or ondansetron (ZOFRAN) IV)    Discontinue   \"Or\" Linked Group Details    -- Verified IV EVERY 6 HOURS AS NEEDED 03/05/21 1219   03/05/21 1218  promethazine (PHENERGAN) tablet 25 mg    Discontinue Note to Pharmacy: OP SIG:Take 25 mg by mouth two. ..    -- Verified PO EVERY 4 HOURS AS NEEDED 03/05/21 1218   03/05/21 1218  hydrALAZINE (APRESOLINE) tablet 75 mg    Discontinue Note to Pharmacy: OP SIG:Take 1.5 Tabs by mouth . ..    -- Verified PO 3 TIMES DAILY AS NEEDED 03/05/21 1218   03/05/21 1213  potassium chloride (K-DUR, KLOR-CON) SR tablet 40 mEq      03/05 1328 Completed PO NOW 03/05/21 1212   03/05/21 1213  magnesium sulfate 2 g/50 ml IVPB (premix or compounded)      03/05 1428 Completed IV ONCE 03/05/21 1212   03/05/21 1058  enoxaparin (LOVENOX) injection 40 mg      03/05 1138 Completed SC ONCE 03/05/21 1057   03/05/21 1058  dilTIAZem (CARDIZEM) injection 10 mg      03/05 1106 Completed IV NOW 03/05/21 1057   03/05/21 1058  dilTIAZem (CARDIZEM) 125 mg/125 mL (1 mg/mL) dextrose 5% infusion    Discontinue    -- Dispensed IV TITRATE 03/05/21 1057   03/05/21 1042  metoprolol (LOPRESSOR) injection 5 mg      03/05 1045 Completed IV NOW 03/05/21 1041   03/05/21 1031  adenosine (ADENOCARD) injection 12 mg      03/05 1034 Completed IV NOW 03/05/21 1700 [facility]

## 2021-03-07 LAB
ABO + RH BLD: NORMAL
ANION GAP SERPL CALC-SCNC: 6 MMOL/L (ref 5–15)
BASOPHILS # BLD: 0 K/UL (ref 0–0.1)
BASOPHILS NFR BLD: 0 % (ref 0–1)
BLD PROD TYP BPU: NORMAL
BLD PROD TYP BPU: NORMAL
BLOOD GROUP ANTIBODIES SERPL: NEGATIVE
BPU ID: NORMAL
BPU ID: NORMAL
BUN SERPL-MCNC: 27 MG/DL (ref 6–20)
BUN/CREAT SERPL: 22 (ref 12–20)
CA-I BLD-MCNC: 8.6 MG/DL (ref 8.5–10.1)
CHLORIDE SERPL-SCNC: 108 MMOL/L (ref 97–108)
CO2 SERPL-SCNC: 26 MMOL/L (ref 21–32)
CREAT SERPL-MCNC: 1.23 MG/DL (ref 0.55–1.02)
CROSSMATCH RESULT,%XM: NORMAL
CROSSMATCH RESULT,%XM: NORMAL
DIFFERENTIAL METHOD BLD: ABNORMAL
EOSINOPHIL # BLD: 0.3 K/UL (ref 0–0.4)
EOSINOPHIL NFR BLD: 2 % (ref 0–7)
ERYTHROCYTE [DISTWIDTH] IN BLOOD BY AUTOMATED COUNT: 19.1 % (ref 11.5–14.5)
GLUCOSE SERPL-MCNC: 149 MG/DL (ref 65–100)
HCT VFR BLD AUTO: 29.5 % (ref 35–47)
HGB BLD-MCNC: 9.1 G/DL (ref 11.5–16)
IMM GRANULOCYTES # BLD AUTO: 0 K/UL (ref 0–0.04)
IMM GRANULOCYTES NFR BLD AUTO: 0 % (ref 0–0.5)
LYMPHOCYTES # BLD: 0.6 K/UL (ref 0.8–3.5)
LYMPHOCYTES NFR BLD: 4 % (ref 12–49)
MCH RBC QN AUTO: 25 PG (ref 26–34)
MCHC RBC AUTO-ENTMCNC: 30.8 G/DL (ref 30–36.5)
MCV RBC AUTO: 81 FL (ref 80–99)
MONOCYTES # BLD: 1 K/UL (ref 0–1)
MONOCYTES NFR BLD: 6 % (ref 5–13)
NEUTS SEG # BLD: 14.3 K/UL (ref 1.8–8)
NEUTS SEG NFR BLD: 88 % (ref 32–75)
PLATELET # BLD AUTO: 264 K/UL (ref 150–400)
PMV BLD AUTO: 11.1 FL (ref 8.9–12.9)
POTASSIUM SERPL-SCNC: 3.4 MMOL/L (ref 3.5–5.1)
RBC # BLD AUTO: 3.64 M/UL (ref 3.8–5.2)
SODIUM SERPL-SCNC: 140 MMOL/L (ref 136–145)
SPECIMEN EXP DATE BLD: NORMAL
STATUS OF UNIT,%ST: NORMAL
STATUS OF UNIT,%ST: NORMAL
TRANSFUSION STATUS PATIENT QL: NORMAL
TRANSFUSION STATUS PATIENT QL: NORMAL
UNIT DIVISION, %UDIV: 0
UNIT DIVISION, %UDIV: 0
WBC # BLD AUTO: 16.2 K/UL (ref 3.6–11)

## 2021-03-07 PROCEDURE — 74011250637 HC RX REV CODE- 250/637: Performed by: HOSPITALIST

## 2021-03-07 PROCEDURE — 74011250636 HC RX REV CODE- 250/636: Performed by: HOSPITALIST

## 2021-03-07 PROCEDURE — 65270000029 HC RM PRIVATE

## 2021-03-07 PROCEDURE — 74011250637 HC RX REV CODE- 250/637: Performed by: INTERNAL MEDICINE

## 2021-03-07 PROCEDURE — 51798 US URINE CAPACITY MEASURE: CPT

## 2021-03-07 PROCEDURE — 80048 BASIC METABOLIC PNL TOTAL CA: CPT

## 2021-03-07 PROCEDURE — 85025 COMPLETE CBC W/AUTO DIFF WBC: CPT

## 2021-03-07 PROCEDURE — 74011250637 HC RX REV CODE- 250/637: Performed by: NURSE PRACTITIONER

## 2021-03-07 PROCEDURE — 74011000258 HC RX REV CODE- 258: Performed by: HOSPITALIST

## 2021-03-07 PROCEDURE — 36415 COLL VENOUS BLD VENIPUNCTURE: CPT

## 2021-03-07 RX ORDER — DILTIAZEM HYDROCHLORIDE 30 MG/1
30 TABLET, FILM COATED ORAL
Status: DISCONTINUED | OUTPATIENT
Start: 2021-03-07 | End: 2021-03-11 | Stop reason: HOSPADM

## 2021-03-07 RX ADMIN — ACETAMINOPHEN 650 MG: 325 TABLET, FILM COATED ORAL at 17:52

## 2021-03-07 RX ADMIN — DILTIAZEM HYDROCHLORIDE 30 MG: 30 TABLET, FILM COATED ORAL at 13:32

## 2021-03-07 RX ADMIN — LEVETIRACETAM 500 MG: 500 TABLET ORAL at 09:53

## 2021-03-07 RX ADMIN — VANCOMYCIN HYDROCHLORIDE 1250 MG: 1.25 INJECTION, POWDER, LYOPHILIZED, FOR SOLUTION INTRAVENOUS at 11:16

## 2021-03-07 RX ADMIN — PIPERACILLIN AND TAZOBACTAM 3.38 G: 3; .375 INJECTION, POWDER, LYOPHILIZED, FOR SOLUTION INTRAVENOUS at 13:32

## 2021-03-07 RX ADMIN — DILTIAZEM HYDROCHLORIDE 30 MG: 30 TABLET, FILM COATED ORAL at 16:33

## 2021-03-07 RX ADMIN — POTASSIUM CHLORIDE 20 MEQ: 1.5 FOR SOLUTION ORAL at 16:33

## 2021-03-07 RX ADMIN — PANTOPRAZOLE SODIUM 40 MG: 40 TABLET, DELAYED RELEASE ORAL at 09:54

## 2021-03-07 RX ADMIN — PIPERACILLIN AND TAZOBACTAM 3.38 G: 3; .375 INJECTION, POWDER, LYOPHILIZED, FOR SOLUTION INTRAVENOUS at 21:04

## 2021-03-07 RX ADMIN — POTASSIUM CHLORIDE 20 MEQ: 1.5 FOR SOLUTION ORAL at 09:54

## 2021-03-07 RX ADMIN — HYDROMORPHONE HYDROCHLORIDE 0.5 MG: 1 INJECTION, SOLUTION INTRAMUSCULAR; INTRAVENOUS; SUBCUTANEOUS at 21:04

## 2021-03-07 RX ADMIN — DILTIAZEM HYDROCHLORIDE 30 MG: 30 TABLET, FILM COATED ORAL at 09:53

## 2021-03-07 RX ADMIN — LEVETIRACETAM 500 MG: 500 TABLET ORAL at 21:04

## 2021-03-07 RX ADMIN — PIPERACILLIN AND TAZOBACTAM 3.38 G: 3; .375 INJECTION, POWDER, LYOPHILIZED, FOR SOLUTION INTRAVENOUS at 06:26

## 2021-03-07 NOTE — PROGRESS NOTES
Hospitalist Progress Note               Daily Progress Note: 3/7/2021      Subjective: The patient is seen for follow  up.   22-year-old female with a history of CVA and left-sided hemiparesis, recently diagnosed with pancreatic cancer with metastatic to the lymph nodes, was on hospice care at home and was found to be altered mental status. On evaluation in ER patient was found to be in A. fib with rapid ventricular rate and was started on Cardizem. Patient is awake and alert but unable to give much history. Today morning patient is found to have drop in her hemoglobin.   We will repeat CBC to confirm and give transfusion  Patient's blood culture was positive for gram-negative rods in 3 of 4 bottles and 2 of 4 bottles is +ve for gram positive cocci,  Patient is feeling better then yesterday    Medications reviewed  Current Facility-Administered Medications   Medication Dose Route Frequency    0.9% sodium chloride infusion 250 mL  250 mL IntraVENous PRN    piperacillin-tazobactam (ZOSYN) 3.375 g in 0.9% sodium chloride (MBP/ADV) 100 mL MBP  3.375 g IntraVENous Q8H    dilTIAZem IR (CARDIZEM) tablet 30 mg  30 mg Oral TIDAC    dilTIAZem (CARDIZEM) 125 mg/125 mL (1 mg/mL) dextrose 5% infusion  0-15 mg/hr IntraVENous TITRATE    HYDROmorphone (DILAUDID) syringe 0.5 mg  0.5 mg IntraVENous Q6H PRN    potassium chloride (KLOR-CON) packet for solution 20 mEq  20 mEq Oral BID WITH MEALS    hydrALAZINE (APRESOLINE) tablet 75 mg  75 mg Oral TID PRN    levETIRAcetam (KEPPRA) tablet 500 mg  500 mg Oral BID    promethazine (PHENERGAN) tablet 25 mg  25 mg Oral Q4H PRN    acetaminophen (TYLENOL) tablet 650 mg  650 mg Oral Q6H PRN    Or    acetaminophen (TYLENOL) suppository 650 mg  650 mg Rectal Q6H PRN    polyethylene glycol (MIRALAX) packet 17 g  17 g Oral DAILY PRN    promethazine (PHENERGAN) tablet 12.5 mg  12.5 mg Oral Q6H PRN    Or    ondansetron (ZOFRAN) injection 4 mg  4 mg IntraVENous Q6H PRN    pantoprazole (PROTONIX) tablet 40 mg  40 mg Oral ACB       Review of Systems:   A comprehensive review of systems was negative except for that written in the HPI. Objective:   Physical Exam:     Visit Vitals  /60 (BP 1 Location: Left upper arm, BP Patient Position: At rest)   Pulse 78   Temp 98.6 °F (37 °C)   Resp 14   Ht 5' 4.96\" (1.65 m)   Wt 90.7 kg (199 lb 15.3 oz)   SpO2 99%   BMI 33.31 kg/m²    O2 Flow Rate (L/min): 2 l/min O2 Device: Nasal cannula    Temp (24hrs), Av.4 °F (36.9 °C), Min:97.5 °F (36.4 °C), Max:99.2 °F (37.3 °C)    No intake/output data recorded.  1901 -  0700  In: 614.6   Out: -     PHYSICAL EXAM:  General: Chronically sick looking. Alert and awake. HEENT: Sclerae anicteric. Extra-occular muscles are intact. No oral ulcers. No ENT discharge. The neck is supple. Cardiovascular:Irregularly irregular, on cardizem drip  Respiratory: Comfortable breathing with no accessory muscle use. Clear breath sounds with no wheezes, rales, or rhonchi. GI: Abdomen nondistended, soft, and nontender. Rectal: Deferred   Musculoskeletal: Patient noticed to have eft hemiparesis. She complains of pain in left upper extremity on passive movement  Neurological: Left hemiparesis, Awake and alert, slow to respond,   Psychiatric: calm and cooperative  . Data Review:       Recent Days:  Recent Labs     21  0730 21  1011 21  0515   WBC 16.2* 18.7* 18.2*   HGB 9.1* 7.2* 6.4*   HCT 29.5* 24.3* 21.2*    294 256     Recent Labs     21  0730 21  0515 21  1100    140 143   K 3.4* 3.3* 3.6    108 107   CO2 26 27 23   * 144* 123*   BUN 27* 26* 20   CREA 1.23* 1.04* 1.46*   CA 8.6 8.4* 9.4   MG  --  1.9 1.7   ALB  --  1.8* 2.1*   TBILI  --  2.6* 5.8*   ALT  --  37 42     No results for input(s): PH, PCO2, PO2, HCO3, FIO2 in the last 72 hours.     Results     Procedure Component Value Units Date/Time    CULTURE, BLOOD, PAIRED [[id number]] (Abnormal) Collected: 03/05/21 1215    Order Status: Completed Specimen: Blood Updated: 03/07/21 0011     Special Requests: No Special Requests        Culture result:       Gram Negative Rods GROWING IN THE FIRST, SECOND, AND FOURTH BOTTLES                  Gram Positive Cocci in clusters GROWING IN THE THIRD AND FOURTH BOTTLES                CT HEAD WO CONT   Final Result   1. Cerebral atrophy and ischemic microvascular disease of white matter. No acute   intracranial abnormality. 2. Remote infarct evident in posterior division right middle cerebral artery   distribution            XR CHEST PORT   Final Result             Assessment/     Problem List:  Hospital Problems  Date Reviewed: 2/17/2021          Codes Class Noted POA    Anemia ICD-10-CM: D64.9  ICD-9-CM: 285.9  3/6/2021 Unknown        Bacteremia ICD-10-CM: R78.81  ICD-9-CM: 790.7  3/6/2021 Unknown        Atrial fibrillation with RVR ([facility] [address] ICD-10-CM: I48.91  ICD-9-CM: 427.31  3/5/2021 Unknown        Hypertension ICD-10-CM: I10  ICD-9-CM: 401.9  3/5/2021 Unknown        Hypomagnesemia ICD-10-CM: E83.42  ICD-9-CM: 275.2  3/5/2021 Unknown        Pancreatic mass ICD-10-CM: K86.89  ICD-9-CM: 577.8  2/16/2021 Yes        Hypokalemia ICD-10-CM: E87.6  ICD-9-CM: 276.8  12/14/2020 Unknown        History of CVA (cerebrovascular accident) ICD-10-CM: Z86.73  ICD-9-CM: V12.54  12/14/2020 Yes        Left hemiplegia ([facility] [address] ICD-10-CM: G81.94  ICD-9-CM: 342.90  12/14/2020 Yes    Overview Signed 12/14/2020  5:24 PM by Tena Michele MD     From prior cva             Seizure [facility]) ICD-10-CM: R56.9  ICD-9-CM: 780.39  12/2/2020 Yes                     Plan:  42-year-old female was brought to the hospital with a complaint of change in her mental status and was found to have atrial fibrillation with rapid ventricular rate  1. Sepsis: Patient blood culture is positive for gram-negative rods and gram +ve cocci. Will escalate patient's antibiotic to Zosyn.  Add vancomycin as well. We will follow up on complete report of blood cultures. 2.  Atrial fibrillation with rapid ventricular rate: Patient is currently on Cardizem drip. Patient will be switched to p.o. Cardizem. Controlled. Hold off on anticoagulation as patient has shown drop in her H&H.    3.  Anemia: H&H improved after transfusing 2 units. Will monitor  Patient is planned to go to hospice once stabilized. We will hold off on aggressive investigation regarding this as patient already have a terminal diagnosis of metastatic adenocarcinoma pancreatic cancer. 4.  Hypomagnesemia and a hypokalemia: Patient was given supplements. Potassium is still low, magnesium has improved. Will give further supplements as needed  5. Adenocarcinoma of pancreatic head: Patient will be discharged to hospice once stabilized. 6.  History of seizure disorder: Continue patient on Keppra  7. History of CVA: We will continue to patient blood monitor blood pressure of the patient. Anticoagulation and antiplatelets on hold as patient has anemia and high risk of GI bleed. Care Plan discussed with: Patient/Family and Nurse    Total time spent with patient: 35 minutes.   Dispo: Discharge to hospice once stable and family agrees  Transfer to tele once cardizem drip is stopped    Andi Amin MD

## 2021-03-07 NOTE — PROGRESS NOTES
Consult for Vancomycin Dosing by Pharmacy by Dr. Darlina Sandifer provided for this 80y.o. year old female , for indication of bacteremia. Day of Therapy: 1  Goal of Level(s): 15-20mcg/dL    Other Current Antibiotics: Zosyn    Significant Cultures:       Date                             Culture                                       Organism      3/7                                Blood                                         GNR (x3 bottles), GPC (x2 bottles)    Serum Creatinine Creatinine   Date Value Ref Range Status   03/07/2021 1.23 (H) 0.55 - 1.02 mg/dL Final   03/06/2021 1.04 (H) 0.55 - 1.02 mg/dL Final   03/05/2021 1.46 (H) 0.55 - 1.02 mg/dL Final      Creatinine Clearance Estimated Creatinine Clearance: 34.5 mL/min (A) (based on SCr of 1.23 mg/dL (H)). BUN Lab Results   Component Value Date/Time    BUN 27 (H) 03/07/2021 07:30 AM      WBC Lab Results   Component Value Date/Time    WBC 16.2 (H) 03/07/2021 07:30 AM      Temp 98.6 °F (37 °C)       Ht Readings from Last 1 Encounters:   03/05/21 165 cm (64.96\")        Wt Readings from Last 1 Encounters:   03/05/21 90.7 kg (199 lb 15.3 oz)     Ideal body weight: 56.9 kg (125 lb 7.4 oz)  Adjusted ideal body weight: 70.4 kg (155 lb 4.2 oz)     New Regimen:   Patient has RENAN. Give Vancomycin 1250mg IV x1 today. Pharmacy will draw a random level tomorrow morning. Pharmacy to follow daily and will make changes to dose and/or frequency based on clinical status.   _________________________________     Pharmacist Debra Juan

## 2021-03-07 NOTE — CONSULTS
Consult    NAME: Katie Infante   :  9/3/1931   MRN:  [id number]     Date/Time:  3/7/2021 8:40 AM    Patient PCP: Asia Farrar MD  ________________________________________________________________________     Problem List:   1. AMS - metabolic encephalopathy  2. Atrial fibrillation with RVR  3. Hypokalemia  4. Hypomagnesemia  5. HTN        Assessment/Plan:     1. Patient observed resting in bed with eyes opened. Confused and disoriented to person, place and time. No acute distress noted. No complaints verbalized  2. Patient is a poor historian, review of chart reveals past medical history of seizures, HTN, breast cancer, MI, and stroke. 3. Atrial fibrillation on telemetry at controlled rate. No acute cardiovascular events overnight. 4. Vital signs remain hemodynamically stable. 5. Will transition to po cardizem at this time with goal heart rate of <100bpm.   6. Based upon my cardiovascular assessment will continue conservative cardiovascular management and follow patient during hospitalization. []        High complexity decision making was performed      Patient Active Problem List   Diagnosis Code    Seizure ([facility] [address] R56.9    Hypertensive urgency I16.0    Hypokalemia E87.6    History of CVA (cerebrovascular accident) Z86.73    Left hemiplegia (HCC) G81.94    Oropharyngeal dysphagia R13.12    Lab test negative for COVID-19 virus Z03.818    Asthenia R53.1    Pancreatic mass K86.89    Atrial fibrillation with RVR (HCC) I48.91    Hypertension I10    Hypomagnesemia E83.42    Anemia D64.9    Bacteremia R78.81        Subjective:   CHIEF COMPLAINT: no complaints expressed    HISTORY OF PRESENT ILLNESS:   Ms. Chelsi Bosch is a 80year old female who presented to the Emergency Department with altered mentation. According to review of chart as patient is a poor historian, past medical history of breast cancer, HTN, MI, seizure, and stroke.  The patient was noted to be found in superventricular tachycardia by EMS staff and received cardizem and adenosine. Patient currently resides with granddaughter who is also her POA. Cardiology consulted for atrial fibrillation with RVR. We were asked to consult for work up and evaluation of the above problems. Past Medical History:   Diagnosis Date    Breast cancer ([facility]     Hypertension     Myocardial infarction ([facility]     Seizure ([facility]     Stroke (cerebrum) ([facility] 2019      No past surgical history on file. No Known Allergies   Meds:  See below  Social History     Tobacco Use    Smoking status: Never Smoker    Smokeless tobacco: Never Used   Substance Use Topics    Alcohol use: Never     Frequency: Never      No family history on file. REVIEW OF SYSTEMS:     []         Unable to obtain  ROS due to ---    [x]         Total of 12 systems reviewed as follows: AMS     Constitutional: negative fever, negative chills, negative weight loss  Eyes:   negative visual changes  ENT:   negative sore throat, tongue or lip swelling  Respiratory:  negative cough, negative dyspnea  Cards:  negative for chest pain, palpitations, lower extremity edema  GI:   negative for nausea, vomiting, diarrhea, and abdominal pain  Genitourinary: negative for frequency, dysuria  Integument:  negative for rash   Hematologic:  negative for easy bruising and gum/nose bleeding  Musculoskel: negative for myalgias,  back pain  Neurological:  negative for headaches, dizziness, vertigo, weakness  Behavl/Psych: negative for feelings of anxiety, depression     Pertinent Positives include : AMS, patient denies any complaints. No acute distress noted    Objective:      Physical Exam:    Last 24hrs VS reviewed since prior progress note.  Most recent are:    Visit Vitals  /60 (BP 1 Location: Left upper arm, BP Patient Position: At rest)   Pulse 78   Temp 98.6 °F (37 °C)   Resp 14   Ht 5' 4.96\" (1.65 m)   Wt 90.7 kg (199 lb 15.3 oz)   SpO2 99%   BMI 33.31 kg/m²       Intake/Output Summary (Last 24 hours) at 3/7/2021 0955  Last data filed at 3/6/2021 1800  Gross per 24 hour   Intake 614.6 ml   Output    Net 614.6 ml        General Appearance: Well developed, well nourished, confused & oriented x 3,    no acute distress. Ears/Nose/Mouth/Throat: Pupils equal and round, Hearing grossly normal.  Neck: Supple. JVP within normal limits. Carotids good upstrokes, with no bruit. Chest: Lungs clear to auscultation bilaterally. Cardiovascular: irregular rate and rhythm, + 2/6 murmur, no rubs, no gallops. Abdomen: Soft, non-tender, bowel sounds are active. No organomegaly. Extremities: +1 non-pitting edema noted to bilateral lower extremities. Femoral pulses +2, Distal Pulses weak bilaterally. Skin: Warm and dry. Neuro: CN II-XII grossly intact, Strength and sensation grossly intact. Data:      Telemetry: atrial fibrillation    EKG:  []  No new EKG for review. Echo Findings    Left Ventricle Normal systolic function (ejection fraction normal). Small left ventricle. Severe concentric hypertrophy. The calculated EF is 72%. There is mild (grade 1) left ventricular diastolic dysfunction. Elevated left ventricular diastolic pressure. Left Atrium Normal cavity size. Right Ventricle Normal global systolic function. Moderately dilated right ventricle. Right Atrium Mildly dilated right atrium. Interatrial Septum Interatrial septum not well visualized   Aortic Valve There is leaflet calcification. Aortic valve area is 1 cm2. There is moderate to severe aortic stenosis. Trace aortic valve regurgitation. Mitral Valve Normal valve structure and no stenosis. Trace regurgitation. Tricuspid Valve Normal valve structure and no stenosis. Trace regurgitation. Pulmonic Valve Pulmonic valve not well visualized. Aorta The aorta was not well visualized. Pulmonary Artery Pulmonary arteries not well visualized. IVC/Hepatic Veins Normal structure.  Normal central venous pressure (3 mmHg); IVC diameter is less than 21 mm and collapses more than 50% with respiration. Pericardium Normal pericardium and no evidence of pericardial effusion. Results for Oleg Garces (MRN [id number]) as of 3/7/2021 09:57   Ref. Range 3/7/2021 07:30   WBC Latest Ref Range: 3.6 - 11.0 K/uL 16.2 (H)   RBC Latest Ref Range: 3.80 - 5.20 M/uL 3.64 (L)   HGB Latest Ref Range: 11.5 - 16.0 g/dL 9.1 (L)   HCT Latest Ref Range: 35.0 - 47.0 % 29.5 (L)   MCV Latest Ref Range: 80.0 - 99.0 FL 81.0   MCH Latest Ref Range: 26.0 - 34.0 PG 25.0 (L)   MCHC Latest Ref Range: 30.0 - 36.5 g/dL 30.8   RDW Latest Ref Range: 11.5 - 14.5 % 19.1 (H)   PLATELET Latest Ref Range: 150 - 400 K/uL 264   MPV Latest Ref Range: 8.9 - 12.9 FL 11.1   NEUTROPHILS Latest Ref Range: 32 - 75 % 88 (H)   LYMPHOCYTES Latest Ref Range: 12 - 49 % 4 (L)   MONOCYTES Latest Ref Range: 5 - 13 % 6   EOSINOPHILS Latest Ref Range: 0 - 7 % 2   BASOPHILS Latest Ref Range: 0 - 1 % 0   IMMATURE GRANULOCYTES Latest Ref Range: 0.0 - 0.5 % 0   DF Latest Units:   AUTOMATED   ABS. NEUTROPHILS Latest Ref Range: 1.8 - 8.0 K/UL 14.3 (H)   ABS. IMM. GRANS. Latest Ref Range: 0.00 - 0.04 K/UL 0.0   ABS. LYMPHOCYTES Latest Ref Range: 0.8 - 3.5 K/UL 0.6 (L)   ABS. MONOCYTES Latest Ref Range: 0.0 - 1.0 K/UL 1.0   ABS. EOSINOPHILS Latest Ref Range: 0.0 - 0.4 K/UL 0.3   ABS. BASOPHILS Latest Ref Range: 0.0 - 0.1 K/UL 0.0   Results for Oleg Garces (MRN [id number]) as of 3/7/2021 09:57   Ref.  Range 3/7/2021 07:30   Sodium Latest Ref Range: 136 - 145 mmol/L 140   Potassium Latest Ref Range: 3.5 - 5.1 mmol/L 3.4 (L)   Chloride Latest Ref Range: 97 - 108 mmol/L 108   CO2 Latest Ref Range: 21 - 32 mmol/L 26   Anion gap Latest Ref Range: 5 - 15 mmol/L 6   Glucose Latest Ref Range: 65 - 100 mg/dL 149 (H)   BUN Latest Ref Range: 6 - 20 mg/dL 27 (H)   Creatinine Latest Ref Range: 0.55 - 1.02 mg/dL 1.23 (H)   BUN/Creatinine ratio Latest Ref Range: 12 - 20   22 (H)   Calcium Latest Ref Range: 8.5 - 10.1 mg/dL 8.6   GFR est non-AA Latest Ref Range: >60 ml/min/1.73m2 41 (L)   GFR est AA Latest Ref Range: >60 ml/min/1.73m2 50 (L)   Current IP Meds  Comment  Expand  Hide  (From admission, onward)     Last edited by  on  at    Start   Stop Status Route Frequency Ordered   03/08/21 0400  VANCOMYCIN RANDOM LAB REMINDER    Discontinue    03/08 1559 Dispensed OTHER ONCE 03/07/21 0941   03/07/21 1030  vancomycin (VANCOCIN) 1,250 mg in 0.9% sodium chloride 250 mL (VIAL-MATE)    Discontinue    03/07 2259 Dispensed IV ONCE 03/07/21 0941   03/07/21 0920  VANCOMYCIN INFORMATION NOTE    Discontinue    -- Dispensed OTHER RX DOSING/MONITORING 03/07/21 0920   03/06/21 2000  potassium chloride (KLOR-CON) packet for solution 20 mEq    Discontinue    -- Dispensed PO 2 TIMES DAILY WITH MEALS 03/06/21 1735   03/06/21 1400  piperacillin-tazobactam (ZOSYN) 3.375 g in 0.9% sodium chloride (MBP/ADV) 100 mL MBP    Discontinue    -- Dispensed IV EVERY 8 HOURS 03/06/21 0933   03/06/21 1400  dilTIAZem (CARDIZEM) 125 mg/125 mL (1 mg/mL) dextrose 5% infusion    Discontinue    -- Dispensed IV TITRATE 03/06/21 1302   03/06/21 1304  HYDROmorphone (DILAUDID) syringe 0.5 mg    Discontinue    03/08 1200 Dispensed IV EVERY 6 HOURS AS NEEDED 03/06/21 1305   03/06/21 0818  0.9% sodium chloride infusion 250 mL    Discontinue    -- Dispensed IV AS NEEDED 03/06/21 0821   03/06/21 0730  pantoprazole (PROTONIX) tablet 40 mg    Discontinue    -- Dispensed PO DAILY BEFORE BREAKFAST 03/05/21 1300   03/05/21 2100  levETIRAcetam (KEPPRA) tablet 500 mg    Discontinue Note to Pharmacy: OP SIG:Take 1 Tab by mouth two. ..     -- Dispensed PO 2 TIMES DAILY 03/05/21 1218   03/05/21 1219  acetaminophen (TYLENOL) tablet 650 mg (acetaminophen PO or MI panel)    Discontinue   \"Or\" Linked Group Details    -- Dispensed PO EVERY 6 HOURS AS NEEDED 03/05/21 1219   03/05/21 1219  acetaminophen (TYLENOL) suppository 650 mg (acetaminophen PO or MI panel)    Discontinue   \"Or\" Linked Group Details    -- Verified RE EVERY 6 HOURS AS NEEDED 03/05/21 1219   03/05/21 1219  polyethylene glycol (MIRALAX) packet 17 g (Bowel Management - 1st Line PRN)    Discontinue    -- Verified PO DAILY PRN 03/05/21 1219   03/05/21 1219  promethazine (PHENERGAN) tablet 12.5 mg (promethazine (PHENERGAN) PO or ondansetron (ZOFRAN) IV)    Discontinue   \"Or\" Linked Group Details    -- Verified PO EVERY 6 HOURS AS NEEDED 03/05/21 1219   03/05/21 1219  ondansetron (ZOFRAN) injection 4 mg (promethazine (PHENERGAN) PO or ondansetron (ZOFRAN) IV)    Discontinue   \"Or\" Linked Group Details    -- Dispensed IV EVERY 6 HOURS AS NEEDED 03/05/21 1219   03/05/21 1218  promethazine (PHENERGAN) tablet 25 mg    Discontinue Note to Pharmacy: OP SIG:Take 25 mg by mouth two. ..    -- Verified PO EVERY 4 HOURS AS NEEDED 03/05/21 1218   03/05/21 1218  hydrALAZINE (APRESOLINE) tablet 75 mg    Discontinue Note to Pharmacy: OP SIG:Take 1.5 Tabs by mouth . ..    -- Verified PO 3 TIMES DAILY AS NEEDED 03/05/21 2103 [facility] Jose Valenzuela NP

## 2021-03-08 LAB
ANION GAP SERPL CALC-SCNC: 8 MMOL/L (ref 5–15)
ATRIAL RATE: 131 BPM
ATRIAL RATE: 234 BPM
BASOPHILS # BLD: 0 K/UL (ref 0–0.1)
BASOPHILS NFR BLD: 0 % (ref 0–1)
BUN SERPL-MCNC: 30 MG/DL (ref 6–20)
BUN/CREAT SERPL: 22 (ref 12–20)
CA-I BLD-MCNC: 9.2 MG/DL (ref 8.5–10.1)
CALCULATED R AXIS, ECG10: -2 DEGREES
CALCULATED R AXIS, ECG10: 22 DEGREES
CALCULATED T AXIS, ECG11: -169 DEGREES
CALCULATED T AXIS, ECG11: -19 DEGREES
CHLORIDE SERPL-SCNC: 109 MMOL/L (ref 97–108)
CO2 SERPL-SCNC: 23 MMOL/L (ref 21–32)
CREAT SERPL-MCNC: 1.27 MG/DL (ref 0.55–1.02)
CREAT SERPL-MCNC: 1.37 MG/DL (ref 0.55–1.02)
DATE LAST DOSE: NORMAL
DIAGNOSIS, 93000: NORMAL
DIAGNOSIS, 93000: NORMAL
DIFFERENTIAL METHOD BLD: ABNORMAL
EOSINOPHIL # BLD: 0.1 K/UL (ref 0–0.4)
EOSINOPHIL NFR BLD: 1 % (ref 0–7)
ERYTHROCYTE [DISTWIDTH] IN BLOOD BY AUTOMATED COUNT: 20 % (ref 11.5–14.5)
GLUCOSE SERPL-MCNC: 133 MG/DL (ref 65–100)
HCT VFR BLD AUTO: 31.2 % (ref 35–47)
HGB BLD-MCNC: 10 G/DL (ref 11.5–16)
IMM GRANULOCYTES # BLD AUTO: 0.1 K/UL (ref 0–0.04)
IMM GRANULOCYTES NFR BLD AUTO: 0 % (ref 0–0.5)
LYMPHOCYTES # BLD: 0.8 K/UL (ref 0.8–3.5)
LYMPHOCYTES NFR BLD: 5 % (ref 12–49)
MCH RBC QN AUTO: 26.5 PG (ref 26–34)
MCHC RBC AUTO-ENTMCNC: 32.1 G/DL (ref 30–36.5)
MCV RBC AUTO: 82.5 FL (ref 80–99)
MONOCYTES # BLD: 1.1 K/UL (ref 0–1)
MONOCYTES NFR BLD: 6 % (ref 5–13)
NEUTS SEG # BLD: 14.9 K/UL (ref 1.8–8)
NEUTS SEG NFR BLD: 88 % (ref 32–75)
PLATELET # BLD AUTO: 276 K/UL (ref 150–400)
PMV BLD AUTO: 11.3 FL (ref 8.9–12.9)
POTASSIUM SERPL-SCNC: 4 MMOL/L (ref 3.5–5.1)
Q-T INTERVAL, ECG07: 182 MS
Q-T INTERVAL, ECG07: 344 MS
QRS DURATION, ECG06: 102 MS
QRS DURATION, ECG06: 70 MS
QTC CALCULATION (BEZET), ECG08: 358 MS
QTC CALCULATION (BEZET), ECG08: 498 MS
RBC # BLD AUTO: 3.78 M/UL (ref 3.8–5.2)
REPORTED DOSE,DOSE: NORMAL UNITS
SODIUM SERPL-SCNC: 140 MMOL/L (ref 136–145)
VANCOMYCIN SERPL-MCNC: 11.3 UG/ML
VENTRICULAR RATE, ECG03: 126 BPM
VENTRICULAR RATE, ECG03: 233 BPM
WBC # BLD AUTO: 16.9 K/UL (ref 3.6–11)

## 2021-03-08 PROCEDURE — 65270000029 HC RM PRIVATE

## 2021-03-08 PROCEDURE — 74011250637 HC RX REV CODE- 250/637: Performed by: INTERNAL MEDICINE

## 2021-03-08 PROCEDURE — 74011250637 HC RX REV CODE- 250/637: Performed by: HOSPITALIST

## 2021-03-08 PROCEDURE — 74011250637 HC RX REV CODE- 250/637: Performed by: NURSE PRACTITIONER

## 2021-03-08 PROCEDURE — 74011000258 HC RX REV CODE- 258: Performed by: HOSPITALIST

## 2021-03-08 PROCEDURE — 80048 BASIC METABOLIC PNL TOTAL CA: CPT

## 2021-03-08 PROCEDURE — 74011250636 HC RX REV CODE- 250/636: Performed by: HOSPITALIST

## 2021-03-08 PROCEDURE — 36415 COLL VENOUS BLD VENIPUNCTURE: CPT

## 2021-03-08 PROCEDURE — 85025 COMPLETE CBC W/AUTO DIFF WBC: CPT

## 2021-03-08 PROCEDURE — 80202 ASSAY OF VANCOMYCIN: CPT

## 2021-03-08 RX ORDER — MORPHINE SULFATE 2 MG/ML
2 INJECTION, SOLUTION INTRAMUSCULAR; INTRAVENOUS
Status: DISCONTINUED | OUTPATIENT
Start: 2021-03-08 | End: 2021-03-09

## 2021-03-08 RX ADMIN — PANTOPRAZOLE SODIUM 40 MG: 40 TABLET, DELAYED RELEASE ORAL at 08:06

## 2021-03-08 RX ADMIN — ACETAMINOPHEN 650 MG: 325 TABLET, FILM COATED ORAL at 16:25

## 2021-03-08 RX ADMIN — SODIUM CHLORIDE: 9 INJECTION, SOLUTION INTRAVENOUS at 11:52

## 2021-03-08 RX ADMIN — DILTIAZEM HYDROCHLORIDE 30 MG: 30 TABLET, FILM COATED ORAL at 11:51

## 2021-03-08 RX ADMIN — ACETAMINOPHEN 650 MG: 325 TABLET, FILM COATED ORAL at 08:05

## 2021-03-08 RX ADMIN — PIPERACILLIN AND TAZOBACTAM 3.38 G: 3; .375 INJECTION, POWDER, LYOPHILIZED, FOR SOLUTION INTRAVENOUS at 14:40

## 2021-03-08 RX ADMIN — DILTIAZEM HYDROCHLORIDE 30 MG: 30 TABLET, FILM COATED ORAL at 16:25

## 2021-03-08 RX ADMIN — ACETAMINOPHEN 650 MG: 325 TABLET, FILM COATED ORAL at 21:14

## 2021-03-08 RX ADMIN — DILTIAZEM HYDROCHLORIDE 30 MG: 30 TABLET, FILM COATED ORAL at 08:05

## 2021-03-08 RX ADMIN — POTASSIUM CHLORIDE 20 MEQ: 1.5 FOR SOLUTION ORAL at 16:25

## 2021-03-08 RX ADMIN — LEVETIRACETAM 500 MG: 500 TABLET ORAL at 21:14

## 2021-03-08 RX ADMIN — PIPERACILLIN AND TAZOBACTAM 3.38 G: 3; .375 INJECTION, POWDER, LYOPHILIZED, FOR SOLUTION INTRAVENOUS at 21:14

## 2021-03-08 RX ADMIN — PIPERACILLIN AND TAZOBACTAM 3.38 G: 3; .375 INJECTION, POWDER, LYOPHILIZED, FOR SOLUTION INTRAVENOUS at 05:43

## 2021-03-08 RX ADMIN — LEVETIRACETAM 500 MG: 500 TABLET ORAL at 08:06

## 2021-03-08 RX ADMIN — POTASSIUM CHLORIDE 20 MEQ: 1.5 FOR SOLUTION ORAL at 08:05

## 2021-03-08 NOTE — PROGRESS NOTES
Consult for Vancomycin Dosing by Pharmacy by Dr. Waylon Shaffer provided for this 80y.o. year old female , for indication of bacteremia. Day of Therapy: 2  Goal of Level(s): 15-20mcg/dL    Other Current Antibiotics: Zosyn    Significant Cultures:       Date                             Culture                                       Organism      3/7                                Blood                                         GNR (x3 bottles), GPC (x2 bottles)    Serum Creatinine Creatinine   Date Value Ref Range Status   03/08/2021 1.27 (H) 0.55 - 1.02 mg/dL Final   03/07/2021 1.23 (H) 0.55 - 1.02 mg/dL Final   03/06/2021 1.04 (H) 0.55 - 1.02 mg/dL Final      Creatinine Clearance Estimated Creatinine Clearance: 33.4 mL/min (A) (based on SCr of 1.27 mg/dL (H)). BUN Lab Results   Component Value Date/Time    BUN 27 (H) 03/07/2021 07:30 AM      WBC Lab Results   Component Value Date/Time    WBC 16.2 (H) 03/07/2021 07:30 AM      Temp 98.1 °F (36.7 °C)       Ht Readings from Last 1 Encounters:   03/05/21 165 cm (64.96\")        Wt Readings from Last 1 Encounters:   03/05/21 90.7 kg (199 lb 15.3 oz)     Ideal body weight: 56.9 kg (125 lb 7.4 oz)  Adjusted ideal body weight: 70.4 kg (155 lb 4.2 oz)     New Regimen:   Patient has RENAN, serum creatinine trending up. Give Vancomycin 7550mg IV x1 today at 11:00. Pharmacy will draw a random level tomorrow morning. Pharmacy to follow daily and will make changes to dose and/or frequency based on clinical status.   _________________________________     Pharmacist Callie Blair, [address] [facility]

## 2021-03-08 NOTE — PROGRESS NOTES
Pt transferred to [facility] to room 482 ,@ 23:30  report given to Anzu  By Ede Finn . Report included the following information SBAR.

## 2021-03-08 NOTE — CONSULTS
Consult    NAME: Vaishnavi Duarte   :  9/3/1931   MRN:  [id number]     Date/Time:  3/8/2021 8:40 AM    Patient PCP: Guerrero Noguera MD  ________________________________________________________________________     Problem List:   1. AMS - metabolic encephalopathy  2. Atrial fibrillation with RVR  3. Hypokalemia  4. Hypomagnesemia  5. HTN  6. 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 Assessment/Plan:   3/8/21  - patient observed resting in bed eyes opened. Alert to person only. Denies complaints. No acute distress noted  - vital signs are stable  - converted from atrial fibrillation to sinus rhythm overnight on telemetry  - Continue cardizem for heart rate control. - EF of 72% with severe hypertrophy, moderate to severe aortic valve stenosis present.  - No invasive cardiovascular testing at this time as this will not change my plan of care. Will continue conservative cardiovascular management.  =========================================================  1. Patient observed resting in bed with eyes opened. Confused and disoriented to person, place and time. No acute distress noted. No complaints verbalized  2. Patient is a poor historian, review of chart reveals past medical history of seizures, HTN, breast cancer, MI, and stroke. 3. Atrial fibrillation on telemetry at controlled rate. No acute cardiovascular events overnight. 4. Vital signs remain hemodynamically stable. 5. Will transition to po cardizem at this time with goal heart rate of <100bpm.   6. Based upon my cardiovascular assessment will continue conservative cardiovascular management and follow patient during hospitalization.           []        High complexity decision making was performed      Patient Active Problem List   Diagnosis Code    Seizure ([facility] [address] R56.9    Hypertensive urgency I16.0    Hypokalemia E87.6    History of CVA (cerebrovascular accident) Z86.73    Left hemiplegia ([address] G81.94  Oropharyngeal dysphagia R13.12    Lab test negative for COVID-19 virus Z03.818    Asthenia R53.1    Pancreatic mass K86.89    Atrial fibrillation with RVR (HCC) I48.91    Hypertension I10    Hypomagnesemia E83.42    Anemia D64.9    Bacteremia R78.81        Subjective:   CHIEF COMPLAINT: no complaints expressed    HISTORY OF PRESENT ILLNESS:   Ms. Josh Madrigal is a 80year old female who presented to the Emergency Department with altered mentation. According to review of chart as patient is a poor historian, past medical history of breast cancer, HTN, MI, seizure, and stroke. The patient was noted to be found in superventricular tachycardia by EMS staff and received cardizem and adenosine. Patient currently resides with granddaughter who is also her POA. Cardiology consulted for atrial fibrillation with RVR. We were asked to consult for work up and evaluation of the above problems. Past Medical History:   Diagnosis Date    Breast cancer ([facility] [address]     Hypertension     Myocardial infarction ([facility] [address]     Seizure ([facility] [address]     Stroke (cerebrum) ([facility] [address] 2019      No past surgical history on file. No Known Allergies   Meds:  See below  Social History     Tobacco Use    Smoking status: Never Smoker    Smokeless tobacco: Never Used   Substance Use Topics    Alcohol use: Never     Frequency: Never      No family history on file. REVIEW OF SYSTEMS:     []         Unable to obtain  ROS due to ---    [x]         Total of 12 systems reviewed as follows:  AMS     Constitutional: negative fever, negative chills, negative weight loss  Eyes:   negative visual changes  ENT:   negative sore throat, tongue or lip swelling  Respiratory:  negative cough, negative dyspnea  Cards:  negative for chest pain, palpitations, lower extremity edema  GI:   negative for nausea, vomiting, diarrhea, and abdominal pain  Genitourinary: negative for frequency, dysuria  Integument:  negative for rash   Hematologic:  negative for easy bruising and gum/nose bleeding  Musculoskel: negative for myalgias,  back pain  Neurological:  negative for headaches, dizziness, vertigo, weakness  Behavl/Psych: negative for feelings of anxiety, depression     Pertinent Positives include : AMS, patient denies any complaints. No acute distress noted    Objective:      Physical Exam:    Last 24hrs VS reviewed since prior progress note. Most recent are:    Visit Vitals  BP (!) 143/75   Pulse 75   Temp 98.1 °F (36.7 °C)   Resp 18   Ht 5' 4.96\" (1.65 m)   Wt 90.7 kg (199 lb 15.3 oz)   SpO2 95%   BMI 33.31 kg/m²       Intake/Output Summary (Last 24 hours) at 3/8/2021 1039  Last data filed at 3/7/2021 2200  Gross per 24 hour   Intake    Output 250 ml   Net -250 ml        General Appearance: Well developed, well nourished, confused & oriented x 3,    no acute distress. Ears/Nose/Mouth/Throat: Pupils equal and round, Hearing grossly normal.  Neck: Supple. JVP within normal limits. Carotids good upstrokes, with no bruit. Chest: Lungs clear to auscultation bilaterally. Cardiovascular: regular rate and rhythm, + 2/6 murmur, no rubs, no gallops. Abdomen: Soft, non-tender, bowel sounds are active. No organomegaly. Extremities: +1 non-pitting edema noted to bilateral lower extremities. Femoral pulses +2, Distal Pulses weak bilaterally. Skin: Warm and dry. Neuro: CN II-XII grossly intact, Strength and sensation grossly intact. Data:      Telemetry: sinus rhythm    EKG:  []  No new EKG for review. Echo Findings    Left Ventricle Normal systolic function (ejection fraction normal). Small left ventricle. Severe concentric hypertrophy. The calculated EF is 72%. There is mild (grade 1) left ventricular diastolic dysfunction. Elevated left ventricular diastolic pressure. Left Atrium Normal cavity size. Right Ventricle Normal global systolic function. Moderately dilated right ventricle. Right Atrium Mildly dilated right atrium.    Interatrial Septum Interatrial septum not well visualized   Aortic Valve There is leaflet calcification. Aortic valve area is 1 cm2. There is moderate to severe aortic stenosis. Trace aortic valve regurgitation. Mitral Valve Normal valve structure and no stenosis. Trace regurgitation. Tricuspid Valve Normal valve structure and no stenosis. Trace regurgitation. Pulmonic Valve Pulmonic valve not well visualized. Aorta The aorta was not well visualized. Pulmonary Artery Pulmonary arteries not well visualized. IVC/Hepatic Veins Normal structure. Normal central venous pressure (3 mmHg); IVC diameter is less than 21 mm and collapses more than 50% with respiration. Pericardium Normal pericardium and no evidence of pericardial effusion. Results for Jes Kim (MRN [id number]) as of 3/8/2021 10:42   Ref. Range 3/7/2021 07:30   WBC Latest Ref Range: 3.6 - 11.0 K/uL 16.2 (H)   RBC Latest Ref Range: 3.80 - 5.20 M/uL 3.64 (L)   HGB Latest Ref Range: 11.5 - 16.0 g/dL 9.1 (L)   HCT Latest Ref Range: 35.0 - 47.0 % 29.5 (L)   MCV Latest Ref Range: 80.0 - 99.0 FL 81.0   MCH Latest Ref Range: 26.0 - 34.0 PG 25.0 (L)   MCHC Latest Ref Range: 30.0 - 36.5 g/dL 30.8   RDW Latest Ref Range: 11.5 - 14.5 % 19.1 (H)   PLATELET Latest Ref Range: 150 - 400 K/uL 264   MPV Latest Ref Range: 8.9 - 12.9 FL 11.1   NEUTROPHILS Latest Ref Range: 32 - 75 % 88 (H)   LYMPHOCYTES Latest Ref Range: 12 - 49 % 4 (L)   MONOCYTES Latest Ref Range: 5 - 13 % 6   EOSINOPHILS Latest Ref Range: 0 - 7 % 2   BASOPHILS Latest Ref Range: 0 - 1 % 0   IMMATURE GRANULOCYTES Latest Ref Range: 0.0 - 0.5 % 0   DF Latest Units:   AUTOMATED   ABS. NEUTROPHILS Latest Ref Range: 1.8 - 8.0 K/UL 14.3 (H)   ABS. IMM. GRANS. Latest Ref Range: 0.00 - 0.04 K/UL 0.0   ABS. LYMPHOCYTES Latest Ref Range: 0.8 - 3.5 K/UL 0.6 (L)   ABS. MONOCYTES Latest Ref Range: 0.0 - 1.0 K/UL 1.0   ABS. EOSINOPHILS Latest Ref Range: 0.0 - 0.4 K/UL 0.3   ABS.  BASOPHILS Latest Ref Range: 0.0 - 0.1 K/UL 0.0 Results for Oleg Garces (MRN [id number]) as of 3/8/2021 10:42   Ref.  Range 3/7/2021 07:30   Sodium Latest Ref Range: 136 - 145 mmol/L 140   Potassium Latest Ref Range: 3.5 - 5.1 mmol/L 3.4 (L)   Chloride Latest Ref Range: 97 - 108 mmol/L 108   CO2 Latest Ref Range: 21 - 32 mmol/L 26   Anion gap Latest Ref Range: 5 - 15 mmol/L 6   Glucose Latest Ref Range: 65 - 100 mg/dL 149 (H)   BUN Latest Ref Range: 6 - 20 mg/dL 27 (H)   Creatinine Latest Ref Range: 0.55 - 1.02 mg/dL 1.23 (H)   BUN/Creatinine ratio Latest Ref Range: 12 - 20   22 (H)   Calcium Latest Ref Range: 8.5 - 10.1 mg/dL 8.6   GFR est non-AA Latest Ref Range: >60 ml/min/1.73m2 41 (L)   GFR est AA Latest Ref Range: >60 ml/min/1.73m2 50 (L)   Current IP Meds  Comment  Expand  Hide  (From admission, onward)     Last edited by  on  at    Start   Stop Status Route Frequency Ordered   03/08/21 0400  VANCOMYCIN RANDOM LAB REMINDER    Discontinue    03/08 1559 Dispensed OTHER ONCE 03/07/21 0941   03/07/21 1300  dilTIAZem IR (CARDIZEM) tablet 30 mg    Discontinue    -- Dispensed PO 3 TIMES DAILY BEFORE MEALS 03/07/21 1258   03/07/21 1030  vancomycin (VANCOCIN) 1,250 mg in 0.9% sodium chloride 250 mL (VIAL-MATE)      03/07 1216 Completed IV ONCE 03/07/21 0941   03/07/21 0920  VANCOMYCIN INFORMATION NOTE    Discontinue    -- Dispensed OTHER RX DOSING/MONITORING 03/07/21 0920   03/06/21 2000  potassium chloride (KLOR-CON) packet for solution 20 mEq    Discontinue    -- Dispensed PO 2 TIMES DAILY WITH MEALS 03/06/21 1735   03/06/21 1400  piperacillin-tazobactam (ZOSYN) 3.375 g in 0.9% sodium chloride (MBP/ADV) 100 mL MBP    Discontinue    -- Dispensed IV EVERY 8 HOURS 03/06/21 0933   03/06/21 1304  HYDROmorphone (DILAUDID) syringe 0.5 mg    Discontinue    03/08 1200 Dispensed IV EVERY 6 HOURS AS NEEDED 03/06/21 1305   03/06/21 0818  0.9% sodium chloride infusion 250 mL    Discontinue    -- Dispensed IV AS NEEDED 03/06/21 0821   03/06/21 0730  pantoprazole (PROTONIX) tablet 40 mg    Discontinue    -- Dispensed PO DAILY BEFORE BREAKFAST 03/05/21 1300   03/05/21 2100  levETIRAcetam (KEPPRA) tablet 500 mg    Discontinue Note to Pharmacy: OP SIG:Take 1 Tab by mouth two. .. -- Dispensed PO 2 TIMES DAILY 03/05/21 1218   03/05/21 1219  acetaminophen (TYLENOL) tablet 650 mg (acetaminophen PO or WA panel)    Discontinue   \"Or\" Linked Group Details    -- Dispensed PO EVERY 6 HOURS AS NEEDED 03/05/21 1219   03/05/21 1219  acetaminophen (TYLENOL) suppository 650 mg (acetaminophen PO or WA panel)    Discontinue   \"Or\" Linked Group Details    -- Verified RE EVERY 6 HOURS AS NEEDED 03/05/21 1219   03/05/21 1219  polyethylene glycol (MIRALAX) packet 17 g (Bowel Management - 1st Line PRN)    Discontinue    -- Verified PO DAILY PRN 03/05/21 1219 03/05/21 1219  promethazine (PHENERGAN) tablet 12.5 mg (promethazine (PHENERGAN) PO or ondansetron (ZOFRAN) IV)    Discontinue   \"Or\" Linked Group Details    -- Verified PO EVERY 6 HOURS AS NEEDED 03/05/21 1219   03/05/21 1219  ondansetron (ZOFRAN) injection 4 mg (promethazine (PHENERGAN) PO or ondansetron (ZOFRAN) IV)    Discontinue   \"Or\" Linked Group Details    -- Dispensed IV EVERY 6 HOURS AS NEEDED 03/05/21 1219 03/05/21 1218  promethazine (PHENERGAN) tablet 25 mg    Discontinue Note to Pharmacy: OP SIG:Take 25 mg by mouth two. ..    -- Verified PO EVERY 4 HOURS AS NEEDED 03/05/21 1218   03/05/21 1218  hydrALAZINE (APRESOLINE) tablet 75 mg    Discontinue Note to Pharmacy: OP SIG:Take 1.5 Tabs by mouth . ..                Justyna Lund NP

## 2021-03-08 NOTE — ACP (ADVANCE CARE PLANNING)
[facility] [facility] Scanned patient's DDNR into Epic. Previous entry dated 2/24/2021 is invalid as it is not signed nor dated.

## 2021-03-08 NOTE — PROGRESS NOTES
Physician Progress Note      Kenny Willis  [facility] #:                  [id number]  :                       9/3/1931  ADMIT DATE:       3/5/2021 10:25 AM  DISCH DATE:  RESPONDING  PROVIDER #:        Nicko Portillo MD          QUERY TEXT:    Pt admitted with AMS, AFIB W/RVR. Pt noted to have >0.30MG/DL CHANGE IN Cr LEVEL WITHIN 48 HRS. If possible, please document in the progress notes and discharge summary if you are evaluating and/or treating any of the following: The medical record reflects the following:  Risk Factors: SEPSIS, METABOLIC ENCEPHALOPATHY, HYPOMAGNESEMIA, HTN, ANEMIA  Clinical Indicators: 3/5 Cr= 1.46, 3/6 Cr= 1.04  Treatment: NS 250ML/BOLUS, ZOSYN & VANC IV, I&O MONITORING, LAB MONITORING    Thank you,  TRICE HennessyN, RN, CDI Specialist  [phone] or [email or web address]  Options provided:  -- Acute kidney failure  -- Acute kidney failure with acute tubular necrosis  -- Acute kidney injury  -- Other - I will add my own diagnosis  -- Disagree - Not applicable / Not valid  -- Disagree - Clinically unable to determine / Unknown  -- Refer to Clinical Documentation Reviewer    PROVIDER RESPONSE TEXT:    This patient is in Acute kidney failure.     Query created by: Miguel Christiansen on 3/8/2021 7:42 AM      Electronically signed by:  Nicko Portillo MD 3/8/2021 12:42 PM

## 2021-03-08 NOTE — PROGRESS NOTES
Chart reviewed. [facility] [facility].

## 2021-03-08 NOTE — PROGRESS NOTES
Hospitalist Progress Note               Daily Progress Note: 3/8/2021      Subjective: The patient is seen for follow  up.   49-year-old female with a history of CVA and left-sided hemiparesis, recently diagnosed with pancreatic cancer with metastatic to the lymph nodes, was on hospice care at home and was found to be altered mental status. On evaluation in ER patient was found to be in A. fib with rapid ventricular rate and was started on Cardizem. Patient is awake and alert but unable to give much history. 3/7/21 morning patient is found to have drop in her hemoglobin.   We will repeat CBC to confirm and give transfusion  Patient's blood culture was positive for gram-negative rods in 3 of 4 bottles and 2 of 4 bottles is +ve for gram positive cocci,  3/8/21 patient converted overnight from atrial fibrillation to sinus rhythm on telemetry    Patient is feeling better then yesterday      Medications reviewed  Current Facility-Administered Medications   Medication Dose Route Frequency    VANCOMYCIN INFORMATION NOTE   Other Rx Dosing/Monitoring    VANCOMYCIN RANDOM LAB REMINDER   Other ONCE    dilTIAZem IR (CARDIZEM) tablet 30 mg  30 mg Oral TIDAC    0.9% sodium chloride infusion 250 mL  250 mL IntraVENous PRN    piperacillin-tazobactam (ZOSYN) 3.375 g in 0.9% sodium chloride (MBP/ADV) 100 mL MBP  3.375 g IntraVENous Q8H    HYDROmorphone (DILAUDID) syringe 0.5 mg  0.5 mg IntraVENous Q6H PRN    potassium chloride (KLOR-CON) packet for solution 20 mEq  20 mEq Oral BID WITH MEALS    hydrALAZINE (APRESOLINE) tablet 75 mg  75 mg Oral TID PRN    levETIRAcetam (KEPPRA) tablet 500 mg  500 mg Oral BID    promethazine (PHENERGAN) tablet 25 mg  25 mg Oral Q4H PRN    acetaminophen (TYLENOL) tablet 650 mg  650 mg Oral Q6H PRN    Or    acetaminophen (TYLENOL) suppository 650 mg  650 mg Rectal Q6H PRN    polyethylene glycol (MIRALAX) packet 17 g  17 g Oral DAILY PRN    promethazine (PHENERGAN) tablet 12.5 mg  12.5 mg Oral Q6H PRN    Or    ondansetron (ZOFRAN) injection 4 mg  4 mg IntraVENous Q6H PRN    pantoprazole (PROTONIX) tablet 40 mg  40 mg Oral ACB       Review of Systems:   A comprehensive review of systems was negative except for that written in the HPI. Objective:   Physical Exam:     Visit Vitals  BP (!) 143/75   Pulse 75   Temp 98.1 °F (36.7 °C)   Resp 18   Ht 5' 4.96\" (1.65 m)   Wt 90.7 kg (199 lb 15.3 oz)   SpO2 95%   BMI 33.31 kg/m²    O2 Flow Rate (L/min): 2.5 l/min O2 Device: Room air    Temp (24hrs), Av.3 °F (36.8 °C), Min:98 °F (36.7 °C), Max:98.8 °F (37.1 °C)    No intake/output data recorded.  1901 -  0700  In: -   Out: 250 [Urine:250]    PHYSICAL EXAM:  General: Chronically sick looking. Alert and awake. HEENT: Sclerae anicteric. Extra-occular muscles are intact. No oral ulcers. No ENT discharge. The neck is supple. Cardiovascular: Regular Rate and rhythm, Systolic Murmur, no rubs or gallops. Respiratory: Comfortable breathing with no accessory muscle use. Clear breath sounds with no wheezes, rales, or rhonchi. GI: Abdomen nondistended, soft, and nontender. Rectal: Deferred   Musculoskeletal: Patient noticed to have eft hemiparesis. She complains of pain in left upper extremity on passive movement  Neurological: Left hemiparesis, Awake and alert, slow to respond,   Psychiatric: calm and cooperative  .     Data Review:       Recent Days:  Recent Labs     21  0730 21  1011 21  0515   WBC 16.2* 18.7* 18.2*   HGB 9.1* 7.2* 6.4*   HCT 29.5* 24.3* 21.2*    294 256     Recent Labs     21  0845 21  0730 21  0515 21  1100   NA  --  140 140 143   K  --  3.4* 3.3* 3.6   CL  --  108 108 107   CO2  --  26 27 23   GLU  --  149* 144* 123*   BUN  --  27* 26* 20   CREA 1.27* 1.23* 1.04* 1.46*   CA  --  8.6 8.4* 9.4   MG  --   --  1.9 1.7   ALB  --   --  1.8* 2.1*   TBILI  --   --  2.6* 5.8*   ALT  --   --  37 42     No results for input(s): PH, PCO2, PO2, HCO3, FIO2 in the last 72 hours. Results     Procedure Component Value Units Date/Time    CULTURE, BLOOD, PAIRED [[id number]]  (Abnormal) Collected: 03/05/21 1215    Order Status: Completed Specimen: Blood Updated: 03/07/21 0011     Special Requests: No Special Requests        Culture result:       Gram Negative Rods GROWING IN THE FIRST, SECOND, AND FOURTH BOTTLES                  Gram Positive Cocci in clusters GROWING IN THE THIRD AND FOURTH BOTTLES                CT HEAD WO CONT   Final Result   1. Cerebral atrophy and ischemic microvascular disease of white matter. No acute   intracranial abnormality. 2. Remote infarct evident in posterior division right middle cerebral artery   distribution            XR CHEST PORT   Final Result             Assessment/     Problem List:  Hospital Problems  Date Reviewed: 2/17/2021          Codes Class Noted POA    Anemia ICD-10-CM: D64.9  ICD-9-CM: 285.9  3/6/2021 Unknown        Bacteremia ICD-10-CM: R78.81  ICD-9-CM: 790.7  3/6/2021 Unknown        Atrial fibrillation with RVR ([facility] [address] ICD-10-CM: I48.91  ICD-9-CM: 427.31  3/5/2021 Unknown        Hypertension ICD-10-CM: I10  ICD-9-CM: 401.9  3/5/2021 Unknown        Hypomagnesemia ICD-10-CM: E83.42  ICD-9-CM: 275.2  3/5/2021 Unknown        Pancreatic mass ICD-10-CM: K86.89  ICD-9-CM: 577.8  2/16/2021 Yes        Hypokalemia ICD-10-CM: E87.6  ICD-9-CM: 276.8  12/14/2020 Unknown        History of CVA (cerebrovascular accident) ICD-10-CM: Z86.73  ICD-9-CM: V12.54  12/14/2020 Yes        Left hemiplegia ([facility] [address] ICD-10-CM: G81.94  ICD-9-CM: 342.90  12/14/2020 Yes    Overview Signed 12/14/2020  5:24 PM by Juanjo Barbosa MD     From prior cva             Seizure [facility]) ICD-10-CM: R56.9  ICD-9-CM: 780.39  12/2/2020 Yes                     Plan:  80-year-old female was brought to the hospital with a complaint of change in her mental status and was found to have atrial fibrillation with rapid ventricular rate    1.   Sepsis: Patient blood culture is positive for gram-negative rods and gram +ve cocci. Continue Zosyn and Vancomycin. We will follow up on complete report of blood cultures. 2.  Atrial fibrillation with rapid ventricular rate: Patient converted to normal sinus rhythm overnight. Patient is currently on Cardizem PO TID. Hold off on anticoagulation as patient has shown drop in her H&H.      3.  Anemia: H&H improved after transfusing 2 units. Will monitor  Patient is planned to go to hospice once stabilized. We will hold off on aggressive investigation regarding this as patient already have a terminal diagnosis of metastatic adenocarcinoma pancreatic cancer. 4.  Hypomagnesemia and a hypokalemia: Patient was given supplements. Potassium is still low, magnesium has improved. Will give further supplements as needed    5. Adenocarcinoma of pancreatic head: Patient will be discharged to hospice once stabilized. 6.  History of seizure disorder: Continue patient on Keppra    7. History of CVA: We will continue to patient blood monitor blood pressure of the patient. Anticoagulation and antiplatelets   on hold as patient has anemia and high risk of GI bleed. Care Plan discussed with: Patient/Family and Nurse    Total time spent with patient: 35 minutes. Dispo: Discharge to hospice once stable and family agrees    TAMMY Lux- Student    Patient was evaluated and examined by me independently. Supervised NP student     Signed:  Mandi Rose MD

## 2021-03-09 LAB
ANION GAP SERPL CALC-SCNC: 8 MMOL/L (ref 5–15)
BACTERIA SPEC CULT: ABNORMAL
BACTERIA SPEC CULT: ABNORMAL
BASOPHILS # BLD: 0 K/UL (ref 0–0.1)
BASOPHILS NFR BLD: 0 % (ref 0–1)
BUN SERPL-MCNC: 29 MG/DL (ref 6–20)
BUN/CREAT SERPL: 25 (ref 12–20)
CA-I BLD-MCNC: 9.6 MG/DL (ref 8.5–10.1)
CHLORIDE SERPL-SCNC: 107 MMOL/L (ref 97–108)
CO2 SERPL-SCNC: 25 MMOL/L (ref 21–32)
CREAT SERPL-MCNC: 1.17 MG/DL (ref 0.55–1.02)
DATE LAST DOSE: NORMAL
DIFFERENTIAL METHOD BLD: ABNORMAL
EOSINOPHIL # BLD: 0.1 K/UL (ref 0–0.4)
EOSINOPHIL NFR BLD: 0 % (ref 0–7)
ERYTHROCYTE [DISTWIDTH] IN BLOOD BY AUTOMATED COUNT: 19.9 % (ref 11.5–14.5)
GLUCOSE SERPL-MCNC: 130 MG/DL (ref 65–100)
HCT VFR BLD AUTO: 32.8 % (ref 35–47)
HGB BLD-MCNC: 10.1 G/DL (ref 11.5–16)
IMM GRANULOCYTES # BLD AUTO: 0.1 K/UL (ref 0–0.04)
IMM GRANULOCYTES NFR BLD AUTO: 0 % (ref 0–0.5)
LYMPHOCYTES # BLD: 0.9 K/UL (ref 0.8–3.5)
LYMPHOCYTES NFR BLD: 5 % (ref 12–49)
MCH RBC QN AUTO: 24.8 PG (ref 26–34)
MCHC RBC AUTO-ENTMCNC: 30.8 G/DL (ref 30–36.5)
MCV RBC AUTO: 80.6 FL (ref 80–99)
MONOCYTES # BLD: 1.7 K/UL (ref 0–1)
MONOCYTES NFR BLD: 8 % (ref 5–13)
NEUTS SEG # BLD: 18.2 K/UL (ref 1.8–8)
NEUTS SEG NFR BLD: 87 % (ref 32–75)
NRBC # BLD: 0 K/UL (ref 0–0.01)
NRBC BLD-RTO: 0 PER 100 WBC
PLATELET # BLD AUTO: 279 K/UL (ref 150–400)
PMV BLD AUTO: 11.1 FL (ref 8.9–12.9)
POTASSIUM SERPL-SCNC: 3.8 MMOL/L (ref 3.5–5.1)
RBC # BLD AUTO: 4.07 M/UL (ref 3.8–5.2)
REPORTED DOSE,DOSE: NORMAL UNITS
SODIUM SERPL-SCNC: 140 MMOL/L (ref 136–145)
SPECIAL REQUESTS,SREQ: ABNORMAL
VANCOMYCIN SERPL-MCNC: 12.6 UG/ML
WBC # BLD AUTO: 20.9 K/UL (ref 3.6–11)

## 2021-03-09 PROCEDURE — 80202 ASSAY OF VANCOMYCIN: CPT

## 2021-03-09 PROCEDURE — 74011250637 HC RX REV CODE- 250/637: Performed by: INTERNAL MEDICINE

## 2021-03-09 PROCEDURE — 85025 COMPLETE CBC W/AUTO DIFF WBC: CPT

## 2021-03-09 PROCEDURE — 80048 BASIC METABOLIC PNL TOTAL CA: CPT

## 2021-03-09 PROCEDURE — 74011250636 HC RX REV CODE- 250/636: Performed by: HOSPITALIST

## 2021-03-09 PROCEDURE — 74011250637 HC RX REV CODE- 250/637: Performed by: NURSE PRACTITIONER

## 2021-03-09 PROCEDURE — 65270000029 HC RM PRIVATE

## 2021-03-09 PROCEDURE — 74011250636 HC RX REV CODE- 250/636: Performed by: INTERNAL MEDICINE

## 2021-03-09 PROCEDURE — 74011000258 HC RX REV CODE- 258: Performed by: HOSPITALIST

## 2021-03-09 PROCEDURE — 74011250637 HC RX REV CODE- 250/637: Performed by: HOSPITALIST

## 2021-03-09 PROCEDURE — 36415 COLL VENOUS BLD VENIPUNCTURE: CPT

## 2021-03-09 RX ORDER — MAGNESIUM SULFATE HEPTAHYDRATE 40 MG/ML
2 INJECTION, SOLUTION INTRAVENOUS ONCE
Status: COMPLETED | OUTPATIENT
Start: 2021-03-09 | End: 2021-03-09

## 2021-03-09 RX ORDER — MORPHINE SULFATE 2 MG/ML
2 INJECTION, SOLUTION INTRAMUSCULAR; INTRAVENOUS
Status: DISPENSED | OUTPATIENT
Start: 2021-03-09 | End: 2021-03-11

## 2021-03-09 RX ADMIN — ACETAMINOPHEN 650 MG: 325 TABLET, FILM COATED ORAL at 09:14

## 2021-03-09 RX ADMIN — LEVETIRACETAM 500 MG: 500 TABLET ORAL at 22:15

## 2021-03-09 RX ADMIN — MORPHINE SULFATE 2 MG: 2 INJECTION, SOLUTION INTRAMUSCULAR; INTRAVENOUS at 06:23

## 2021-03-09 RX ADMIN — HYDRALAZINE HYDROCHLORIDE 75 MG: 50 TABLET, FILM COATED ORAL at 06:28

## 2021-03-09 RX ADMIN — DILTIAZEM HYDROCHLORIDE 30 MG: 30 TABLET, FILM COATED ORAL at 08:24

## 2021-03-09 RX ADMIN — PIPERACILLIN AND TAZOBACTAM 3.38 G: 3; .375 INJECTION, POWDER, LYOPHILIZED, FOR SOLUTION INTRAVENOUS at 22:15

## 2021-03-09 RX ADMIN — POTASSIUM CHLORIDE 20 MEQ: 1.5 FOR SOLUTION ORAL at 16:21

## 2021-03-09 RX ADMIN — LEVETIRACETAM 500 MG: 500 TABLET ORAL at 08:24

## 2021-03-09 RX ADMIN — SODIUM CHLORIDE 750 MG: 9 INJECTION, SOLUTION INTRAVENOUS at 11:17

## 2021-03-09 RX ADMIN — POTASSIUM CHLORIDE 20 MEQ: 1.5 FOR SOLUTION ORAL at 08:24

## 2021-03-09 RX ADMIN — PANTOPRAZOLE SODIUM 40 MG: 40 TABLET, DELAYED RELEASE ORAL at 08:24

## 2021-03-09 RX ADMIN — DILTIAZEM HYDROCHLORIDE 30 MG: 30 TABLET, FILM COATED ORAL at 16:21

## 2021-03-09 RX ADMIN — PIPERACILLIN AND TAZOBACTAM 3.38 G: 3; .375 INJECTION, POWDER, LYOPHILIZED, FOR SOLUTION INTRAVENOUS at 14:10

## 2021-03-09 RX ADMIN — MAGNESIUM SULFATE HEPTAHYDRATE 2 G: 40 INJECTION, SOLUTION INTRAVENOUS at 09:09

## 2021-03-09 RX ADMIN — DILTIAZEM HYDROCHLORIDE 30 MG: 30 TABLET, FILM COATED ORAL at 11:17

## 2021-03-09 RX ADMIN — PIPERACILLIN AND TAZOBACTAM 3.38 G: 3; .375 INJECTION, POWDER, LYOPHILIZED, FOR SOLUTION INTRAVENOUS at 06:23

## 2021-03-09 RX ADMIN — ONDANSETRON 4 MG: 2 INJECTION INTRAMUSCULAR; INTRAVENOUS at 02:48

## 2021-03-09 NOTE — CONSULTS
Consult    NAME: Ángel Weeks   :  9/3/1931   MRN:  [id number]     Date/Time:  3/9/2021 9:22AM    Patient PCP: Alisa Hankins MD  ________________________________________________________________________     Problem List:   1. AMS - metabolic encephalopathy  2. Atrial fibrillation with RVR  3. Hypokalemia  4. Hypomagnesemia  5. HTN  6. 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 Assessment/Plan:   3/9/21  - patient observed lying in bed with eyes closed. Slow to respond. Confused and disoriented x3. No acute distress noted. - remains in sinus rhythm on telemetry without acute cardiovascular changes overnight.  - vital signs are hemodynamically stable  - based upon by cardiovascular assessment will continue conservative management. Patient wishes to remain comfortable and not pursue evaluation for aortic valve stenosis. Diagnosed with pancreatic cancer with metastasis to the lymph nodes. On hospice at home. Will sign-off at this time.  ===========================================================  3/8/21  - patient observed resting in bed eyes opened. Alert to person only. Denies complaints. No acute distress noted  - vital signs are stable  - converted from atrial fibrillation to sinus rhythm overnight on telemetry  - Continue cardizem for heart rate control. - EF of 72% with severe hypertrophy, moderate to severe aortic valve stenosis present.  - No invasive cardiovascular testing at this time as this will not change my plan of care. Will continue conservative cardiovascular management.  =========================================================  1. Patient observed resting in bed with eyes opened. Confused and disoriented to person, place and time. No acute distress noted. No complaints verbalized  2. Patient is a poor historian, review of chart reveals past medical history of seizures, HTN, breast cancer, MI, and stroke.    3. Atrial fibrillation on telemetry at controlled rate. No acute cardiovascular events overnight. 4. Vital signs remain hemodynamically stable. 5. Will transition to po cardizem at this time with goal heart rate of <100bpm.   6. Based upon my cardiovascular assessment will continue conservative cardiovascular management and follow patient during hospitalization. []        High complexity decision making was performed      Patient Active Problem List   Diagnosis Code    [facility]izure ([facility] R56.9    Hypertensive urgency I16.0    Hypokalemia E87.6    History of CVA (cerebrovascular accident) Z86.73    Left hemiplegia (HCC) G81.94    Oropharyngeal dysphagia R13.12    Lab test negative for COVID-19 virus Z03.818    Asthenia R53.1    Pancreatic mass K86.89    Atrial fibrillation with RVR (HCC) I48.91    Hypertension I10    Hypomagnesemia E83.42    Anemia D64.9    Bacteremia R78.81        Subjective:   CHIEF COMPLAINT: no complaints expressed    HISTORY OF PRESENT ILLNESS:   Ms. Jose Enrique Altman is a 80year old female who presented to the Emergency Department with altered mentation. According to review of chart as patient is a poor historian, past medical history of breast cancer, HTN, MI, seizure, and stroke. The patient was noted to be found in superventricular tachycardia by EMS staff and received cardizem and adenosine. Patient currently resides with granddaughter who is also her POA. Cardiology consulted for atrial fibrillation with RVR. We were asked to consult for work up and evaluation of the above problems. Past Medical History:   Diagnosis Date    Breast cancer ([facility]     Hypertension     Myocardial infarction ([facility]     Seizure ([facility]     Stroke (cerebrum) ([facility] 2019      No past surgical history on file.   No Known Allergies   Meds:  See below  Social History     Tobacco Use    Smoking status: Never Smoker    Smokeless tobacco: Never Used   Substance Use Topics    Alcohol use: Never     Frequency: Never      No family history on file. REVIEW OF SYSTEMS:     []         Unable to obtain  ROS due to ---    [x]         Total of 12 systems reviewed as follows: AMS     Constitutional: negative fever, negative chills, negative weight loss  Eyes:   negative visual changes  ENT:   negative sore throat, tongue or lip swelling  Respiratory:  negative cough, negative dyspnea  Cards:  negative for chest pain, palpitations, lower extremity edema  GI:   negative for nausea, vomiting, diarrhea, and abdominal pain  Genitourinary: negative for frequency, dysuria  Integument:  negative for rash   Hematologic:  negative for easy bruising and gum/nose bleeding  Musculoskel: negative for myalgias,  back pain  Neurological:  negative for headaches, dizziness, vertigo, weakness  Behavl/Psych: negative for feelings of anxiety, depression     Pertinent Positives include : AMS, patient denies any complaints. No acute distress noted    Objective:      Physical Exam:    Last 24hrs VS reviewed since prior progress note. Most recent are:    Visit Vitals  BP (!) 150/69 (BP Patient Position: At rest)   Pulse (!) 114   Temp 98.1 °F (36.7 °C)   Resp 20   Ht 5' 4.96\" (1.65 m)   Wt 90.7 kg (199 lb 15.3 oz)   SpO2 97%   BMI 33.31 kg/m²     No intake or output data in the 24 hours ending 03/09/21 1007     General Appearance: Well developed, well nourished, confused & oriented x 3,    no acute distress. Ears/Nose/Mouth/Throat: Pupils equal and round, Hearing grossly normal.  Neck: Supple. JVP within normal limits. Carotids good upstrokes, with no bruit. Chest: Lungs clear to auscultation bilaterally. Cardiovascular: regular rate and rhythm, + 2/6 murmur, no rubs, no gallops. Abdomen: Soft, non-tender, bowel sounds are active. No organomegaly. Extremities: +1 non-pitting edema noted to bilateral lower extremities. Femoral pulses +2, Distal Pulses weak bilaterally. Skin: Warm and dry.   Neuro: CN II-XII grossly intact, Strength and sensation grossly intact. Data:      Telemetry: sinus rhythm    EKG:  []  No new EKG for review. Echo Findings    Left Ventricle Normal systolic function (ejection fraction normal). Small left ventricle. Severe concentric hypertrophy. The calculated EF is 72%. There is mild (grade 1) left ventricular diastolic dysfunction. Elevated left ventricular diastolic pressure. Left Atrium Normal cavity size. Right Ventricle Normal global systolic function. Moderately dilated right ventricle. Right Atrium Mildly dilated right atrium. Interatrial Septum Interatrial septum not well visualized   Aortic Valve There is leaflet calcification. Aortic valve area is 1 cm2. There is moderate to severe aortic stenosis. Trace aortic valve regurgitation. Mitral Valve Normal valve structure and no stenosis. Trace regurgitation. Tricuspid Valve Normal valve structure and no stenosis. Trace regurgitation. Pulmonic Valve Pulmonic valve not well visualized. Aorta The aorta was not well visualized. Pulmonary Artery Pulmonary arteries not well visualized. IVC/Hepatic Veins Normal structure. Normal central venous pressure (3 mmHg); IVC diameter is less than 21 mm and collapses more than 50% with respiration. Pericardium Normal pericardium and no evidence of pericardial effusion. Results for Chris Marin (MRN [id number]) as of 3/9/2021 10:09   Ref.  Range 3/9/2021 08:11   WBC Latest Ref Range: 3.6 - 11.0 K/uL 20.9 (H)   NRBC Latest Ref Range: 0  WBC 0.0   RBC Latest Ref Range: 3.80 - 5.20 M/uL 4.07   HGB Latest Ref Range: 11.5 - 16.0 g/dL 10.1 (L)   HCT Latest Ref Range: 35.0 - 47.0 % 32.8 (L)   MCV Latest Ref Range: 80.0 - 99.0 FL 80.6   MCH Latest Ref Range: 26.0 - 34.0 PG 24.8 (L)   MCHC Latest Ref Range: 30.0 - 36.5 g/dL 30.8   RDW Latest Ref Range: 11.5 - 14.5 % 19.9 (H)   PLATELET Latest Ref Range: 150 - 400 K/uL 279   MPV Latest Ref Range: 8.9 - 12.9 FL 11.1   NEUTROPHILS Latest Ref Range: 32 - 75 % 87 (H) LYMPHOCYTES Latest Ref Range: 12 - 49 % 5 (L)   MONOCYTES Latest Ref Range: 5 - 13 % 8   EOSINOPHILS Latest Ref Range: 0 - 7 % 0   BASOPHILS Latest Ref Range: 0 - 1 % 0   IMMATURE GRANULOCYTES Latest Ref Range: 0.0 - 0.5 % 0   DF Latest Units:   AUTOMATED   ABSOLUTE NRBC Latest Ref Range: 0.00 - 0.01 K/uL 0.00   ABS. NEUTROPHILS Latest Ref Range: 1.8 - 8.0 K/UL 18.2 (H)   ABS. IMM. GRANS. Latest Ref Range: 0.00 - 0.04 K/UL 0.1 (H)   ABS. LYMPHOCYTES Latest Ref Range: 0.8 - 3.5 K/UL 0.9   ABS. MONOCYTES Latest Ref Range: 0.0 - 1.0 K/UL 1.7 (H)   ABS. EOSINOPHILS Latest Ref Range: 0.0 - 0.4 K/UL 0.1   ABS.  BASOPHILS Latest Ref Range: 0.0 - 0.1 K/UL 0.0   Sodium Latest Ref Range: 136 - 145 mmol/L 140   Potassium Latest Ref Range: 3.5 - 5.1 mmol/L 3.8   Chloride Latest Ref Range: 97 - 108 mmol/L 107   CO2 Latest Ref Range: 21 - 32 mmol/L 25   Anion gap Latest Ref Range: 5 - 15 mmol/L 8   Glucose Latest Ref Range: 65 - 100 mg/dL 130 (H)   BUN Latest Ref Range: 6 - 20 mg/dL 29 (H)   Creatinine Latest Ref Range: 0.55 - 1.02 mg/dL 1.17 (H)   BUN/Creatinine ratio Latest Ref Range: 12 - 20   25 (H)   Calcium Latest Ref Range: 8.5 - 10.1 mg/dL 9.6   GFR est non-AA Latest Ref Range: >60 ml/min/1.73m2 44 (L)   GFR est AA Latest Ref Range: >60 ml/min/1.73m2 53 (L)   Vancomycin, random Latest Units: ug/mL 12.6   Current IP Meds  Comment  Expand  Hide  (From admission, onward)     Last edited by  on  at    Start   Stop Status Route Frequency Ordered   03/11/21 1400  Draw vancomycin trough 03/11 at 14:00 BEFORE 16:00 dose (VANCOMYCIN DOSE, TROUGH AND MAR PLACEHOLDER PANEL)    Discontinue    03/12 0159 Dispensed OTHER ONCE 03/09/21 0950   03/09/21 1000  vancomycin (VANCOCIN) 750 mg in 0.9% sodium chloride 250 mL (VIAL-MATE) (VANCOMYCIN DOSE, TROUGH AND MAR PLACEHOLDER PANEL)    Discontinue    -- Verified IV EVERY 18 HOURS 03/09/21 0950   03/09/21 0900  magnesium sulfate 2 g/50 ml IVPB (premix or compounded)    Discontinue 03/09 2059 Dispensed IV ONCE 03/09/21 0850   03/09/21 0459  morphine injection 2 mg    Discontinue    03/11 1158 Dispensed IV EVERY 6 HOURS AS NEEDED 03/09/21 0500   03/09/21 0400  VANCOMYCIN INFORMATION NOTE    Discontinue    03/09 1559 Dispensed OTHER ONCE 03/08/21 1055   03/08/21 1100  vancomycin (VANCOCIN) 750 mg in 0.9% sodium chloride 250 mL (VIAL-MATE)      03/08 1252 Completed IV ONCE 03/08/21 1053   03/07/21 1300  dilTIAZem IR (CARDIZEM) tablet 30 mg    Discontinue    -- Dispensed PO 3 TIMES DAILY BEFORE MEALS 03/07/21 1258   03/07/21 0920  VANCOMYCIN INFORMATION NOTE    Discontinue    -- Dispensed OTHER RX DOSING/MONITORING 03/07/21 0920   03/06/21 2000  potassium chloride (KLOR-CON) packet for solution 20 mEq    Discontinue    -- Dispensed PO 2 TIMES DAILY WITH MEALS 03/06/21 1735   03/06/21 1400  piperacillin-tazobactam (ZOSYN) 3.375 g in 0.9% sodium chloride (MBP/ADV) 100 mL MBP    Discontinue    -- Dispensed IV EVERY 8 HOURS 03/06/21 0933   03/06/21 1304  HYDROmorphone (DILAUDID) syringe 0.5 mg      03/08 1200 Dispensed IV EVERY 6 HOURS AS NEEDED 03/06/21 1305   03/06/21 0818  0.9% sodium chloride infusion 250 mL    Discontinue    -- Dispensed IV AS NEEDED 03/06/21 0821   03/06/21 0730  pantoprazole (PROTONIX) tablet 40 mg    Discontinue    -- Dispensed PO DAILY BEFORE BREAKFAST 03/05/21 1300   03/05/21 2100  levETIRAcetam (KEPPRA) tablet 500 mg    Discontinue Note to Pharmacy: OP SIG:Take 1 Tab by mouth two. ..     -- Dispensed PO 2 TIMES DAILY 03/05/21 1218   03/05/21 1219  acetaminophen (TYLENOL) tablet 650 mg (acetaminophen PO or NC panel)    Discontinue   \"Or\" Linked Group Details    -- Dispensed PO EVERY 6 HOURS AS NEEDED 03/05/21 1219   03/05/21 1219  acetaminophen (TYLENOL) suppository 650 mg (acetaminophen PO or NC panel)    Discontinue   \"Or\" Linked Group Details    -- Verified RE EVERY 6 HOURS AS NEEDED 03/05/21 1219   03/05/21 1219  polyethylene glycol (MIRALAX) packet 17 g (Bowel Management - 1st Line PRN)    Discontinue    -- Verified PO DAILY PRN 03/05/21 1219   03/05/21 1219  promethazine (PHENERGAN) tablet 12.5 mg (promethazine (PHENERGAN) PO or ondansetron (ZOFRAN) IV)    Discontinue   \"Or\" Linked Group Details    -- Verified PO EVERY 6 HOURS AS NEEDED 03/05/21 1219   03/05/21 1219  ondansetron (ZOFRAN) injection 4 mg (promethazine (PHENERGAN) PO or ondansetron (ZOFRAN) IV)    Discontinue   \"Or\" Linked Group Details    -- Dispensed IV EVERY 6 HOURS AS NEEDED 03/05/21 1219   03/05/21 1218  promethazine (PHENERGAN) tablet 25 mg    Discontinue Note to Pharmacy: OP SIG:Take 25 mg by mouth two. ..    -- Verified PO EVERY 4 HOURS AS NEEDED 03/05/21 1218   03/05/21 1218  hydrALAZINE (APRESOLINE) tablet 75 mg    Discontinue Note to Pharmacy: OP SIG:Take 1.5 Tabs by mouth . ..    -- Dispensed PO 3 TIMES DAILY AS NEEDED 03/05/21 1400 [facility] Veronique Pineda NP

## 2021-03-09 NOTE — ROUTINE PROCESS
Verbal bedside shift change report given to Lv Landaverde RN (oncoming nurse) by Sarah Luna RN(offgoing nurse). Report included the following information SBAR, Intake/Output, MAR, Recent Results and Cardiac Rhythm . Phyllis Beverly

## 2021-03-09 NOTE — PROGRESS NOTES
Hospitalist Progress Note               Daily Progress Note: 3/9/2021      Subjective:     89F, h/o CVA with left hemiparesis, metastatic pancreatic cancer to lymphnodes with acute encephalopathy (on hospice care at home) s/t sepsis s/t gram negative bacteremia and grom positive cocci in the setting of Afib with RVR complicated by RENAN. Still confused but more alert  Continue vanc and zosyn- received Abx day 4  Repeat Bcx          Medications reviewed  Current Facility-Administered Medications   Medication Dose Route Frequency    morphine injection 2 mg  2 mg IntraVENous Q6H PRN    vancomycin (VANCOCIN) 750 mg in 0.9% sodium chloride 250 mL (VIAL-MATE)  750 mg IntraVENous Q18H    [START ON 3/11/2021] Draw vancomycin trough 03/11 at 14:00 BEFORE 16:00 dose   Other ONCE    VANCOMYCIN INFORMATION NOTE   Other ONCE    VANCOMYCIN INFORMATION NOTE   Other Rx Dosing/Monitoring    dilTIAZem IR (CARDIZEM) tablet 30 mg  30 mg Oral TIDAC    0.9% sodium chloride infusion 250 mL  250 mL IntraVENous PRN    piperacillin-tazobactam (ZOSYN) 3.375 g in 0.9% sodium chloride (MBP/ADV) 100 mL MBP  3.375 g IntraVENous Q8H    potassium chloride (KLOR-CON) packet for solution 20 mEq  20 mEq Oral BID WITH MEALS    hydrALAZINE (APRESOLINE) tablet 75 mg  75 mg Oral TID PRN    levETIRAcetam (KEPPRA) tablet 500 mg  500 mg Oral BID    promethazine (PHENERGAN) tablet 25 mg  25 mg Oral Q4H PRN    acetaminophen (TYLENOL) tablet 650 mg  650 mg Oral Q6H PRN    Or    acetaminophen (TYLENOL) suppository 650 mg  650 mg Rectal Q6H PRN    polyethylene glycol (MIRALAX) packet 17 g  17 g Oral DAILY PRN    promethazine (PHENERGAN) tablet 12.5 mg  12.5 mg Oral Q6H PRN    Or    ondansetron (ZOFRAN) injection 4 mg  4 mg IntraVENous Q6H PRN    pantoprazole (PROTONIX) tablet 40 mg  40 mg Oral ACB       Review of Systems:   A comprehensive review of systems was negative except for that written in the HPI.     Objective:   Physical Exam: Visit Vitals  BP (!) 153/68 (BP Patient Position: At rest;Supine)   Pulse 92   Temp 98.1 °F (36.7 °C)   Resp 20   Ht 5' 4.96\" (1.65 m)   Wt 90.7 kg (199 lb 15.3 oz)   SpO2 96%   BMI 33.31 kg/m²    O2 Flow Rate (L/min): 2 l/min O2 Device: Nasal cannula    Temp (24hrs), Av.2 °F (36.8 °C), Min:97.8 °F (36.6 °C), Max:98.4 °F (36.9 °C)    No intake/output data recorded.  1901 -  0700  In: -   Out: 250 [Urine:250]    PHYSICAL EXAM:  General: Chronically sick looking. Alert and awake. HEENT: Sclerae anicteric. Extra-occular muscles are intact. No oral ulcers. No ENT discharge. The neck is supple. Cardiovascular: Regular Rate and rhythm, Systolic Murmur, no rubs or gallops. Respiratory: Comfortable breathing with no accessory muscle use. Clear breath sounds with no wheezes, rales, or rhonchi. GI: Abdomen nondistended, soft, and nontender. Rectal: Deferred   Musculoskeletal: Patient noticed to have eft hemiparesis. She complains of pain in left upper extremity on passive movement  Neurological: Left hemiparesis, Awake and alert, slow to respond,   Psychiatric: calm and cooperative  . Data Review:       Recent Days:  Recent Labs     21  0811 21  0845 21  0730   WBC 20.9* 16.9* 16.2*   HGB 10.1* 10.0* 9.1*   HCT 32.8* 31.2* 29.5*    276 264     Recent Labs     21  0811 21  0845 21  0730    140 140   K 3.8 4.0 3.4*    109* 108   CO2 25 23 26   * 133* 149*   BUN 29* 30* 27*   CREA 1.17* 1.37*  1.27* 1.23*   CA 9.6 9.2 8.6     No results for input(s): PH, PCO2, PO2, HCO3, FIO2 in the last 72 hours.     Results     Procedure Component Value Units Date/Time    CULTURE, BLOOD [[id number]]     Order Status: Sent Specimen: Blood     CULTURE, BLOOD, PAIRED [[id number]]  (Abnormal)  (Susceptibility) Collected: 21 1215    Order Status: Completed Specimen: Blood Updated: 21     Special Requests: No Special Requests        Culture result:       Klebsiella pneumoniae GROWING IN THE 1ST, 2ND, AND 4TH BOTTLES DRAWN                  Staphylococcus epidermidis (Oxacillin resistant) GROWING IN THE 3RD AND 4TH BOTTLES DRAWN          Susceptibility      Klebsiella pneumoniae     DEVEN     Amikacin ($) Susceptible     Ampicillin ($) Resistant     Ampicillin/sulbactam ($) Susceptible     Cefazolin ($) Susceptible     Cefepime ($$) Susceptible     Cefoxitin Susceptible     Ceftazidime ($) Susceptible     Ceftriaxone ($) Susceptible     Ciprofloxacin ($) Susceptible     Gentamicin ($) Susceptible     Levofloxacin ($) Susceptible     Meropenem ($$) Susceptible     Piperacillin/Tazobac ($) Susceptible     Tobramycin ($) Susceptible     Trimeth/Sulfa Susceptible                Susceptibility      Staphylococcus epidermidis     DEVEN     Ciprofloxacin ($) Susceptible     Erythromycin ($$$$) Resistant     Gentamicin ($) Susceptible     Levofloxacin ($) Susceptible     Linezolid ($$$$$) Susceptible     Moxifloxacin ($$$$) Susceptible     Oxacillin Resistant     Rifampin ($$$$) Susceptible [1]      Tetracycline Susceptible     Trimeth/Sulfa Susceptible     Vancomycin ($) Susceptible            [1]   Rifampin is not to be used for mono-therapy. CT HEAD WO CONT   Final Result   1. Cerebral atrophy and ischemic microvascular disease of white matter. No acute   intracranial abnormality.    2. Remote infarct evident in posterior division right middle cerebral artery   distribution            XR CHEST PORT   Final Result             Assessment/     Problem List:  Hospital Problems  Date Reviewed: 2/17/2021          Codes Class Noted POA    Anemia ICD-10-CM: D64.9  ICD-9-CM: 285.9  3/6/2021 Unknown        Bacteremia ICD-10-CM: R78.81  ICD-9-CM: 790.7  3/6/2021 Unknown        Atrial fibrillation with RVR ([facility] ICD-10-CM: I48.91  ICD-9-CM: 427.31  3/5/2021 Unknown        Hypertension ICD-10-CM: I10  ICD-9-CM: 401.9  3/5/2021 Unknown Hypomagnesemia ICD-10-CM: E83.42  ICD-9-CM: 275.2  3/5/2021 Unknown        Pancreatic mass ICD-10-CM: K86.89  ICD-9-CM: 577.8  2/16/2021 Yes        Hypokalemia ICD-10-CM: E87.6  ICD-9-CM: 276.8  12/14/2020 Unknown        History of CVA (cerebrovascular accident) ICD-10-CM: Z86.73  ICD-9-CM: V12.54  12/14/2020 Yes        Left hemiplegia (HCC) ICD-10-CM: G81.94  ICD-9-CM: 342.90  12/14/2020 Yes    Overview Signed 12/14/2020  5:24 PM by Drake Kirby MD     From prior cva             Seizure (HCC) ICD-10-CM: R56.9  ICD-9-CM: 780.39  12/2/2020 Yes                     Plan:  89-year-old female was brought to the hospital with a complaint of change in her mental status and was found to have atrial fibrillation with rapid ventricular rate    (1) Acute encephaloapthy: GCS 13. Septic. Treat with Abx.    (2)  Sepsis: Patient blood culture is positive for gram-negative rods and gram +ve cocci.  Continue Zosyn and Vancomycin. We will follow up on complete report of blood cultures.    (3) Atrial fibrillation with rapid ventricular rate: Patient converted to normal sinus rhythm overnight. Patient is currently on Cardizem PO TID.  Hold off on anticoagulation as patient has shown drop in her H&H.      (4)  Anemia: H&H improved after transfusing 2 units. Will monitor  Patient is planned to go to hospice once stabilized. We will hold off on aggressive investigation regarding this as patient already have a terminal diagnosis of metastatic adenocarcinoma pancreatic cancer.    (5)  Hypomagnesemia and a hypokalemia: Patient was given supplements.  Potassium is still low, magnesium has improved.  Will give further supplements as needed    (6)  Adenocarcinoma of pancreatic head: Patient will be discharged to hospice once stabilized.    (7) History of seizure disorder: Continue patient on Keppra    (8)  History of CVA: We will continue to patient blood monitor blood pressure of the patient.  Anticoagulation and antiplatelets   on hold  as patient has anemia and high risk of GI bleed. Care Plan discussed with: Patient/Family and Nurse    Total time spent with patient: 35 minutes.     Dispo: Discharge to hospice once stable and family agrees- likely tomorroe         Signed: Dorinda Eisenmenger, MD

## 2021-03-10 PROCEDURE — 74011250636 HC RX REV CODE- 250/636: Performed by: HOSPITALIST

## 2021-03-10 PROCEDURE — 74011000258 HC RX REV CODE- 258: Performed by: HOSPITALIST

## 2021-03-10 PROCEDURE — 65270000029 HC RM PRIVATE

## 2021-03-10 PROCEDURE — 74011250637 HC RX REV CODE- 250/637: Performed by: HOSPITALIST

## 2021-03-10 PROCEDURE — 74011000250 HC RX REV CODE- 250: Performed by: HOSPITALIST

## 2021-03-10 PROCEDURE — 74011250637 HC RX REV CODE- 250/637: Performed by: NURSE PRACTITIONER

## 2021-03-10 PROCEDURE — 74011250636 HC RX REV CODE- 250/636: Performed by: INTERNAL MEDICINE

## 2021-03-10 PROCEDURE — 74011250637 HC RX REV CODE- 250/637: Performed by: INTERNAL MEDICINE

## 2021-03-10 RX ADMIN — DILTIAZEM HYDROCHLORIDE 30 MG: 30 TABLET, FILM COATED ORAL at 11:17

## 2021-03-10 RX ADMIN — ONDANSETRON 4 MG: 2 INJECTION INTRAMUSCULAR; INTRAVENOUS at 18:11

## 2021-03-10 RX ADMIN — LEVETIRACETAM 500 MG: 500 TABLET ORAL at 09:05

## 2021-03-10 RX ADMIN — POTASSIUM CHLORIDE 20 MEQ: 1.5 FOR SOLUTION ORAL at 16:59

## 2021-03-10 RX ADMIN — PIPERACILLIN AND TAZOBACTAM 3.38 G: 3; .375 INJECTION, POWDER, LYOPHILIZED, FOR SOLUTION INTRAVENOUS at 09:04

## 2021-03-10 RX ADMIN — CEFTRIAXONE 1 G: 1 INJECTION, POWDER, FOR SOLUTION INTRAMUSCULAR; INTRAVENOUS at 14:34

## 2021-03-10 RX ADMIN — MORPHINE SULFATE 2 MG: 2 INJECTION, SOLUTION INTRAMUSCULAR; INTRAVENOUS at 12:29

## 2021-03-10 RX ADMIN — SODIUM CHLORIDE 750 MG: 9 INJECTION, SOLUTION INTRAVENOUS at 04:00

## 2021-03-10 RX ADMIN — DILTIAZEM HYDROCHLORIDE 30 MG: 30 TABLET, FILM COATED ORAL at 09:05

## 2021-03-10 RX ADMIN — DILTIAZEM HYDROCHLORIDE 30 MG: 30 TABLET, FILM COATED ORAL at 16:59

## 2021-03-10 RX ADMIN — PANTOPRAZOLE SODIUM 40 MG: 40 TABLET, DELAYED RELEASE ORAL at 09:05

## 2021-03-10 RX ADMIN — POTASSIUM CHLORIDE 20 MEQ: 1.5 FOR SOLUTION ORAL at 09:05

## 2021-03-10 NOTE — PROGRESS NOTES
CM received call from [facility], [phone], with [employer] and she expressed concern regarding patient's care at home. She reported that she does not believe patient has enough care at home however she is aware that they would not qualify for PCA services with medicaid due to being over income. CM contacted patient's Marina Vaca [facility] [phone], she reports that her mother, patient's daughter, Khang Cardona, lives with the patient however Ms. Fabienne Martínez provides the patient's care daily. Ms. Fabienne Martínez reports that she has applied for medicaid and is requesting a [facility] for PCA services. Ms. Fabienne Martínez reported that she is providing patient with 24/7 care between her, her mother, and the Hospice nurses. Ms. Fabienne Martínez wishes for patient to return home with Hospice, [facility]. CM continues to follow.

## 2021-03-10 NOTE — PROGRESS NOTES
Hospitalist Progress Note               Daily Progress Note: 3/10/2021      Subjective:     89F, h/o CVA with left hemiparesis, metastatic pancreatic cancer to lymphnodes with acute encephalopathy (on hospice care at home) s/t sepsis s/t gram negative bacteremia and grom positive cocci in the setting of Afib with RVR complicated by RENAN. Still confused but more alert  Change abx for ceftriaxone, dc vancomycin and zosyn  Repeat Bcx  Plan for dc tomorrow          Medications reviewed  Current Facility-Administered Medications   Medication Dose Route Frequency    cefTRIAXone (ROCEPHIN) 1 g in sterile water (preservative free) 10 mL IV syringe  1 g IntraVENous Q24H    morphine injection 2 mg  2 mg IntraVENous Q6H PRN    [START ON 3/11/2021] Draw vancomycin trough 03/11 at 14:00 BEFORE 16:00 dose   Other ONCE    VANCOMYCIN INFORMATION NOTE   Other Rx Dosing/Monitoring    dilTIAZem IR (CARDIZEM) tablet 30 mg  30 mg Oral TIDAC    0.9% sodium chloride infusion 250 mL  250 mL IntraVENous PRN    potassium chloride (KLOR-CON) packet for solution 20 mEq  20 mEq Oral BID WITH MEALS    hydrALAZINE (APRESOLINE) tablet 75 mg  75 mg Oral TID PRN    levETIRAcetam (KEPPRA) tablet 500 mg  500 mg Oral BID    promethazine (PHENERGAN) tablet 25 mg  25 mg Oral Q4H PRN    acetaminophen (TYLENOL) tablet 650 mg  650 mg Oral Q6H PRN    Or    acetaminophen (TYLENOL) suppository 650 mg  650 mg Rectal Q6H PRN    polyethylene glycol (MIRALAX) packet 17 g  17 g Oral DAILY PRN    promethazine (PHENERGAN) tablet 12.5 mg  12.5 mg Oral Q6H PRN    Or    ondansetron (ZOFRAN) injection 4 mg  4 mg IntraVENous Q6H PRN    pantoprazole (PROTONIX) tablet 40 mg  40 mg Oral ACB       Review of Systems:   A comprehensive review of systems was negative except for that written in the HPI.     Objective:   Physical Exam:     Visit Vitals  BP (!) 174/76 (BP Patient Position: At rest)   Pulse 95   Temp 98.6 °F (37 °C)   Resp 20   Ht 5' 4.96\" (1.65 m)   Wt 90.7 kg (199 lb 15.3 oz)   SpO2 100%   BMI 33.31 kg/m²    O2 Flow Rate (L/min): 2 l/min O2 Device: Room air    Temp (24hrs), Av.4 °F (36.9 °C), Min:97.7 °F (36.5 °C), Max:98.9 °F (37.2 °C)    No intake/output data recorded. No intake/output data recorded. PHYSICAL EXAM:  General: Chronically sick looking. Alert and awake. HEENT: Sclerae anicteric. Extra-occular muscles are intact. No oral ulcers. No ENT discharge. The neck is supple. Cardiovascular: Regular Rate and rhythm, Systolic Murmur, no rubs or gallops. Respiratory: Comfortable breathing with no accessory muscle use. Clear breath sounds with no wheezes, rales, or rhonchi. GI: Abdomen nondistended, soft, and nontender. Rectal: Deferred   Musculoskeletal: Patient noticed to have eft hemiparesis. She complains of pain in left upper extremity on passive movement  Neurological: Left hemiparesis, Awake and alert, slow to respond,   Psychiatric: calm and cooperative  . Data Review:       Recent Days:  Recent Labs     21  0811 21  0845   WBC 20.9* 16.9*   HGB 10.1* 10.0*   HCT 32.8* 31.2*    276     Recent Labs     21  0811 21  0845    140   K 3.8 4.0    109*   CO2 25 23   * 133*   BUN 29* 30*   CREA 1.17* 1.37*  1.27*   CA 9.6 9.2     No results for input(s): PH, PCO2, PO2, HCO3, FIO2 in the last 72 hours.     Results     Procedure Component Value Units Date/Time    CULTURE, BLOOD [[id number]] Collected: 21 0900    Order Status: Canceled Specimen: Blood     CULTURE, BLOOD, PAIRED [[id number]]  (Abnormal)  (Susceptibility) Collected: 21 1215    Order Status: Completed Specimen: Blood Updated: 21     Special Requests: No Special Requests        Culture result:       Klebsiella pneumoniae GROWING IN THE 1ST, 2ND, AND 4TH BOTTLES DRAWN                  Staphylococcus epidermidis (Oxacillin resistant) GROWING IN THE 3RD AND 4TH BOTTLES DRAWN          Susceptibility Klebsiella pneumoniae     DEVEN     Amikacin ($) Susceptible     Ampicillin ($) Resistant     Ampicillin/sulbactam ($) Susceptible     Cefazolin ($) Susceptible     Cefepime ($$) Susceptible     Cefoxitin Susceptible     Ceftazidime ($) Susceptible     Ceftriaxone ($) Susceptible     Ciprofloxacin ($) Susceptible     Gentamicin ($) Susceptible     Levofloxacin ($) Susceptible     Meropenem ($$) Susceptible     Piperacillin/Tazobac ($) Susceptible     Tobramycin ($) Susceptible     Trimeth/Sulfa Susceptible                Susceptibility      Staphylococcus epidermidis     DEVEN     Ciprofloxacin ($) Susceptible     Erythromycin ($$$$) Resistant     Gentamicin ($) Susceptible     Levofloxacin ($) Susceptible     Linezolid ($$$$$) Susceptible     Moxifloxacin ($$$$) Susceptible     Oxacillin Resistant     Rifampin ($$$$) Susceptible [1]      Tetracycline Susceptible     Trimeth/Sulfa Susceptible     Vancomycin ($) Susceptible            [1]   Rifampin is not to be used for mono-therapy. CT HEAD WO CONT   Final Result   1. Cerebral atrophy and ischemic microvascular disease of white matter. No acute   intracranial abnormality.    2. Remote infarct evident in posterior division right middle cerebral artery   distribution            XR CHEST PORT   Final Result             Assessment/     Problem List:  Hospital Problems  Date Reviewed: 2/17/2021          Codes Class Noted POA    Anemia ICD-10-CM: D64.9  ICD-9-CM: 285.9  3/6/2021 Unknown        Bacteremia ICD-10-CM: R78.81  ICD-9-CM: 790.7  3/6/2021 Unknown        Atrial fibrillation with RVR ([facility] ICD-10-CM: I48.91  ICD-9-CM: 427.31  3/5/2021 Unknown        Hypertension ICD-10-CM: I10  ICD-9-CM: 401.9  3/5/2021 Unknown        Hypomagnesemia ICD-10-CM: E83.42  ICD-9-CM: 275.2  3/5/2021 Unknown        Pancreatic mass ICD-10-CM: K86.89  ICD-9-CM: 577.8  2/16/2021 Yes        Hypokalemia ICD-10-CM: E87.6  ICD-9-CM: 276.8  12/14/2020 Unknown        History of CVA (cerebrovascular accident) ICD-10-CM: Z86.73  ICD-9-CM: V12.54  12/14/2020 Yes        Left hemiplegia ([address] ICD-10-CM: G81.94  ICD-9-CM: 342.90  12/14/2020 Yes    Overview Signed 12/14/2020  5:24 PM by Gasper Gregorio MD     From prior cva             Seizure [facility]) ICD-10-CM: R56.9  ICD-9-CM: 780.39  12/2/2020 Yes                     Plan:  60-year-old female was brought to the hospital with a complaint of change in her mental status and was found to have atrial fibrillation with rapid ventricular rate    (1) Acute encephaloapthy: GCS 13. Septic. Treat with Abx.    (2)  Sepsis: Patient blood culture is positive for gram-negative rods and gram +ve cocci. Continue Zosyn and Vancomycin. We will follow up on complete report of blood cultures. (3) Atrial fibrillation with rapid ventricular rate: Patient converted to normal sinus rhythm overnight. Patient is currently on Cardizem PO TID. Hold off on anticoagulation as patient has shown drop in her H&H. (4)  Anemia: H&H improved after transfusing 2 units. Will monitor  Patient is planned to go to hospice once stabilized. We will hold off on aggressive investigation regarding this as patient already have a terminal diagnosis of metastatic adenocarcinoma pancreatic cancer. (5)  Hypomagnesemia and a hypokalemia: Patient was given supplements. Potassium is still low, magnesium has improved. Will give further supplements as needed    (6)  Adenocarcinoma of pancreatic head: Patient will be discharged to hospice once stabilized. (7) History of seizure disorder: Continue patient on Keppra    (8)  History of CVA: We will continue to patient blood monitor blood pressure of the patient. Anticoagulation and antiplatelets   on hold as patient has anemia and high risk of GI bleed.       Still confused but more alert  Change abx for ceftriaxone, dc vancomycin and zosyn  Repeat Bcx  Plan for dc tomorrow    Care Plan discussed with: Patient/Family and Nurse    Total time spent with patient: 35 minutes.     Dispo: Discharge to hospice once stable and family agrees- likely tomorroe         Signed: Jenniffer Acosta MD

## 2021-03-11 VITALS
RESPIRATION RATE: 18 BRPM | WEIGHT: 199.96 LBS | SYSTOLIC BLOOD PRESSURE: 154 MMHG | OXYGEN SATURATION: 100 % | HEART RATE: 90 BPM | TEMPERATURE: 97.5 F | DIASTOLIC BLOOD PRESSURE: 80 MMHG | HEIGHT: 65 IN | BODY MASS INDEX: 33.31 KG/M2

## 2021-03-11 LAB
ANION GAP SERPL CALC-SCNC: 6 MMOL/L (ref 5–15)
BUN SERPL-MCNC: 28 MG/DL (ref 6–20)
BUN/CREAT SERPL: 28 (ref 12–20)
CA-I BLD-MCNC: 10.1 MG/DL (ref 8.5–10.1)
CHLORIDE SERPL-SCNC: 109 MMOL/L (ref 97–108)
CO2 SERPL-SCNC: 25 MMOL/L (ref 21–32)
CREAT SERPL-MCNC: 1 MG/DL (ref 0.55–1.02)
GLUCOSE BLD STRIP.AUTO-MCNC: 127 MG/DL (ref 65–100)
GLUCOSE SERPL-MCNC: 112 MG/DL (ref 65–100)
PERFORMED BY, TECHID: ABNORMAL
POTASSIUM SERPL-SCNC: 4.8 MMOL/L (ref 3.5–5.1)
SODIUM SERPL-SCNC: 140 MMOL/L (ref 136–145)

## 2021-03-11 PROCEDURE — 74011250637 HC RX REV CODE- 250/637: Performed by: HOSPITALIST

## 2021-03-11 PROCEDURE — 87040 BLOOD CULTURE FOR BACTERIA: CPT

## 2021-03-11 PROCEDURE — 80048 BASIC METABOLIC PNL TOTAL CA: CPT

## 2021-03-11 PROCEDURE — 74011250637 HC RX REV CODE- 250/637: Performed by: INTERNAL MEDICINE

## 2021-03-11 PROCEDURE — 77010033678 HC OXYGEN DAILY

## 2021-03-11 PROCEDURE — 82962 GLUCOSE BLOOD TEST: CPT

## 2021-03-11 PROCEDURE — 74011250637 HC RX REV CODE- 250/637: Performed by: NURSE PRACTITIONER

## 2021-03-11 PROCEDURE — 94760 N-INVAS EAR/PLS OXIMETRY 1: CPT

## 2021-03-11 PROCEDURE — 36415 COLL VENOUS BLD VENIPUNCTURE: CPT

## 2021-03-11 RX ORDER — HYDRALAZINE HYDROCHLORIDE 50 MG/1
50 TABLET, FILM COATED ORAL 3 TIMES DAILY
Status: DISCONTINUED | OUTPATIENT
Start: 2021-03-11 | End: 2021-03-11 | Stop reason: HOSPADM

## 2021-03-11 RX ORDER — CEPHALEXIN 500 MG/1
500 CAPSULE ORAL 2 TIMES DAILY
Qty: 20 CAP | Refills: 0 | Status: SHIPPED | OUTPATIENT
Start: 2021-03-11

## 2021-03-11 RX ORDER — OXYCODONE AND ACETAMINOPHEN 5; 325 MG/1; MG/1
1 TABLET ORAL
Status: DISCONTINUED | OUTPATIENT
Start: 2021-03-11 | End: 2021-03-11 | Stop reason: HOSPADM

## 2021-03-11 RX ORDER — METOPROLOL TARTRATE 50 MG/1
100 TABLET ORAL 2 TIMES DAILY
Status: DISCONTINUED | OUTPATIENT
Start: 2021-03-11 | End: 2021-03-11

## 2021-03-11 RX ADMIN — LEVETIRACETAM 500 MG: 500 TABLET ORAL at 08:41

## 2021-03-11 RX ADMIN — HYDRALAZINE HYDROCHLORIDE 75 MG: 50 TABLET, FILM COATED ORAL at 08:40

## 2021-03-11 RX ADMIN — DILTIAZEM HYDROCHLORIDE 30 MG: 30 TABLET, FILM COATED ORAL at 08:40

## 2021-03-11 RX ADMIN — POTASSIUM CHLORIDE 20 MEQ: 1.5 FOR SOLUTION ORAL at 08:40

## 2021-03-11 RX ADMIN — PANTOPRAZOLE SODIUM 40 MG: 40 TABLET, DELAYED RELEASE ORAL at 08:41

## 2021-03-11 NOTE — PROGRESS NOTES
Patient is clear to d/c to home with hospice, provided by [facility]. CM contacted patient's NOK, [facility] [phone], to notify, she is agreeable to d/c, reported that her mother would be at the house and patient was clear to d/c at any time. CM notified nurse. Discharge checklist completed and placed on chart.

## 2021-03-11 NOTE — PROGRESS NOTES
Bedside and Verbal shift change report given to Debbi Melchor (oncoming nurse) by Diana Ashby RN (offgoing nurse). Report included the following information SBAR, Intake/Output, Recent Results and Cardiac Rhythm SR. Discharge plan of care/case management plan validated with provider discharge order. Patient discharged home to hospice with Kyle. Patient IVs removed, tips intact, patient tolerated well. Telemetry discontinued. Patient sent home via [place] transport. Discharge packet with information sent with transport. Notified NOK about patients disposition home. Per [facility], daughter is at home waiting for patient to arrive.

## 2021-03-11 NOTE — DISCHARGE SUMMARY
Physician Discharge Summary     Patient ID:    Megan Noe  [id number]  89 y.o.  9/3/1931    Admit date: 3/5/2021    Discharge date : 3/11/2021    Chronic Diagnoses:    Problem List as of 3/11/2021 Date Reviewed: 2/17/2021          Codes Class Noted - Resolved    Anemia ICD-10-CM: D64.9  ICD-9-CM: 285.9  3/6/2021 - Present        Bacteremia ICD-10-CM: R78.81  ICD-9-CM: 790.7  3/6/2021 - Present        Atrial fibrillation with RVR ([facility] ICD-10-CM: I48.91  ICD-9-CM: 427.31  3/5/2021 - Present        Hypertension ICD-10-CM: I10  ICD-9-CM: 401.9  3/5/2021 - Present        Hypomagnesemia ICD-10-CM: E83.42  ICD-9-CM: 275.2  3/5/2021 - Present        Pancreatic mass ICD-10-CM: K86.89  ICD-9-CM: 577.8  2/16/2021 - Present        Hypertensive urgency ICD-10-CM: I16.0  ICD-9-CM: 401.9  12/14/2020 - Present        Hypokalemia ICD-10-CM: E87.6  ICD-9-CM: 276.8  12/14/2020 - Present        History of CVA (cerebrovascular accident) ICD-10-CM: Z86.73  ICD-9-CM: V12.54  12/14/2020 - Present        Left hemiplegia ([facility] ICD-10-CM: G81.94  ICD-9-CM: 342.90  12/14/2020 - Present    Overview Signed 12/14/2020  5:24 PM by David Porter MD     From prior cva             Oropharyngeal dysphagia ICD-10-CM: R13.12  ICD-9-CM: 787.22  12/14/2020 - Present        Lab test negative for COVID-19 virus ICD-10-CM: Z03.818  ICD-9-CM: V71.83  12/14/2020 - Present        Asthenia ICD-10-CM: R53.1  ICD-9-CM: 780.79  12/14/2020 - Present        Seizure ([facility] ICD-10-CM: R56.9  ICD-9-CM: 780.39  12/2/2020 - Present        RESOLVED: Encephalopathy acute ICD-10-CM: G93.40  ICD-9-CM: 348.30  12/14/2020 - 12/14/2020        RESOLVED: Pneumonia ICD-10-CM: J18.9  ICD-9-CM: 068  12/14/2020 - 12/14/2020        RESOLVED: RENAN (acute kidney injury) ([facility] ICD-10-CM: N17.9  ICD-9-CM: 584.9  12/14/2020 - 12/14/2020        RESOLVED: Hypernatremia ICD-10-CM: E87.0  ICD-9-CM: 276.0  12/14/2020 - 12/14/2020          22    Final Diagnoses: Atrial fibrillation with RVR ([facility] [I48.91]    Reason for Hospitalization:  Acute encephalopathy   Sepsis  Afib  Pancreatic cancer         Hospital Course:     89F, h/o CVA with left hemiparesis, metastatic pancreatic cancer to lymphnodes with acute encephalopathy (on hospice care at home) s/t sepsis s/t gram negative bacteremia and grom positive cocci in the setting of Afib with RVR complicated by RENAN. During the course of her stay- bcx grew [place] - source unknown. ECHO no vegetations. Repeated Bcx pending, sensitive to ceftriaxone. Gram positive appeared contaminent    This was complicated by Afib with RVR, now on cardizem 30 IR three times a day      During the course of her stay she improved clinically-more alert and was planned for dc . Home hospice. INSTRUCTIONS ON DISCHARGE     (1) please take keflex 500mg twice a day for 10 days     (2) F/u with PCP and cardiology - we stopped your metoprolol and placed you on cardizem IR 30mg three times a day     (3) continue to take cardizem and hydralazine for bloood pressure        Discharge Medications:   Current Discharge Medication List      START taking these medications    Details   cephALEXin (Keflex) 500 mg capsule Take 1 Cap by mouth two (2) times a day. Qty: 20 Cap, Refills: 0         CONTINUE these medications which have NOT CHANGED    Details   docusate sodium (COLACE) 100 mg capsule Take 100 mg by mouth two (2) times daily as needed for Constipation. traZODone (DESYREL) 50 mg tablet Take 50 mg by mouth nightly. oxyCODONE-acetaminophen (Percocet) 5-325 mg per tablet Take 1 Tab by mouth every four (4) hours as needed for Pain. acetaminophen (TYLENOL) 325 mg tablet Take 2 Tabs by mouth every six (6) hours as needed for Pain or Fever. Qty: 30 Tab, Refills: 0      hydrALAZINE (APRESOLINE) 50 mg tablet Take 1.5 Tabs by mouth three (3) times daily.   Qty: 60 Tab, Refills: 0      levETIRAcetam (KEPPRA) 500 mg tablet Take 1 Tab by mouth two (2) times a day. Qty: 60 Tab, Refills: 0      promethazine (PHENERGAN) 25 mg tablet Take 25 mg by mouth two (2) times daily as needed for Nausea. omeprazole (PRILOSEC) 40 mg capsule Take 40 mg by mouth daily. metoprolol tartrate (LOPRESSOR) 100 mg IR tablet Take 1 Tab by mouth two (2) times a day. Qty: 60 Tab, Refills: 0               Follow up Care:    1. Beau Watts MD in 1-2 weeks. Please call to set up an appointment shortly after discharge. cardiac    Disposition:  Home hospice    Advanced Directive:   FULL    DNR x     Discharge Exam:  PHYSICAL EXAM:  General: Chronically sick looking. Alert and awake. HEENT: Sclerae anicteric. Extra-occular muscles are intact. No oral ulcers. No ENT discharge. The neck is supple. Cardiovascular: Regular Rate and rhythm, Systolic Murmur, no rubs or gallops. Respiratory: Comfortable breathing with no accessory muscle use. Clear breath sounds with no wheezes, rales, or rhonchi. GI: Abdomen nondistended, soft, and nontender. Rectal: Deferred   Musculoskeletal: Patient noticed to have eft hemiparesis. She complains of pain in left upper extremity on passive movement  Neurological: Left hemiparesis, Awake and alert, slow to respond,   Psychiatric: calm and cooperative    CONSULTATIONS: cardiology    Significant Diagnostic Studies:     Radiologic Studies -   Results from [place] encounter on 03/05/21   XR CHEST PORT    Narrative Chest single view. Comparison single view chest January 29, 2021. EKG pads overlie significant portion of the right chest.     Approximate 1 cm left basilar nodular density. Pulmonary nodules reported on CT  chest February 17, 2021. Coarse reticular markings present through the lungs. No gross interstitial or  alveolar pulmonary edema. Cardiac and mediastinal structures unchanged. Thoracic  aorta atherosclerosis. No pneumothorax or sizable pleural effusion.           CT Results  (Last 48 hours)    None Discharge time spent 35 minutes    Signed:  Yaneth Conway MD  3/11/2021  9:37 AM

## 2021-03-11 NOTE — DISCHARGE INSTRUCTIONS
INSTRUCTIONS ON DISCHARGE    (1) please take keflex 500mg twice a day for 10 days    (2) F/u with PCP and cardiology - we stopped your metoprolol and placed you on cardizem IR 30mg three times a day    (3) continue to take cardizem and hydralazine for bloood pressure         Atrial Fibrillation: Care Instructions  Your Care Instructions     Atrial fibrillation is an irregular and often fast heartbeat. Treating this condition is important for several reasons. It can cause blood clots, which can travel from your heart to your brain and cause a stroke. If you have a fast heartbeat, you may feel lightheaded, dizzy, and weak. An irregular heartbeat can also increase your risk for heart failure. Atrial fibrillation is often the result of another heart condition, such as high blood pressure or coronary artery disease. Making changes to improve your heart condition will help you stay healthy and active. Follow-up care is a key part of your treatment and safety. Be sure to make and go to all appointments, and call your doctor if you are having problems. It's also a good idea to know your test results and keep a list of the medicines you take. How can you care for yourself at home? Medicines    · Take your medicines exactly as prescribed. Call your doctor if you think you are having a problem with your medicine. You will get more details on the specific medicines your doctor prescribes.     · If your doctor has given you a blood thinner to prevent a stroke, be sure you get instructions about how to take your medicine safely. Blood thinners can cause serious bleeding problems.     · Do not take any vitamins, over-the-counter drugs, or herbal products without talking to your doctor first.   Lifestyle changes    · Do not smoke. Smoking can increase your chance of a stroke and heart attack. If you need help quitting, talk to your doctor about stop-smoking programs and medicines.  These can increase your chances of quitting for good.     · Eat a heart-healthy diet.     · Stay at a healthy weight. Lose weight if you need to.     · Limit alcohol to 2 drinks a day for men and 1 drink a day for women. Too much alcohol can cause health problems.     · Avoid colds and flu. Get a pneumococcal vaccine shot. If you have had one before, ask your doctor whether you need another dose. Get a flu shot every year. If you must be around people with colds or flu, wash your hands often. Activity    · If your doctor recommends it, get more exercise. Walking is a good choice. Bit by bit, increase the amount you walk every day. Try for at least 30 minutes on most days of the week. You also may want to swim, bike, or do other activities. Your doctor may suggest that you join a cardiac rehabilitation program so that you can have help increasing your physical activity safely.     · Start light exercise if your doctor says it is okay. Even a small amount will help you get stronger, have more energy, and manage stress. Walking is an easy way to get exercise. Start out by walking a little more than you did in the hospital. Gradually increase the amount you walk.     · When you exercise, watch for signs that your heart is working too hard. You are pushing too hard if you cannot talk while you are exercising. If you become short of breath or dizzy or have chest pain, sit down and rest immediately.     · Check your pulse regularly. Place two fingers on the artery at the palm side of your wrist, in line with your thumb. If your heartbeat seems uneven or fast, talk to your doctor. When should you call for help? Call 911 anytime you think you may need emergency care. For example, call if:    · You have symptoms of a heart attack. These may include:  ? Chest pain or pressure, or a strange feeling in the chest.  ? Sweating. ? Shortness of breath. ? Nausea or vomiting.   ? Pain, pressure, or a strange feeling in the back, neck, jaw, or upper belly or in one or both shoulders or arms. ? Lightheadedness or sudden weakness. ? A fast or irregular heartbeat. After you call 911, the  may tell you to chew 1 adult-strength or 2 to 4 low-dose aspirin. Wait for an ambulance. Do not try to drive yourself.     · You have symptoms of a stroke. These may include:  ? Sudden numbness, tingling, weakness, or loss of movement in your face, arm, or leg, especially on only one side of your body. ? Sudden vision changes. ? Sudden trouble speaking. ? Sudden confusion or trouble understanding simple statements. ? Sudden problems with walking or balance. ? A sudden, severe headache that is different from past headaches.     · You passed out (lost consciousness). Call your doctor now or seek immediate medical care if:    · You have new or increased shortness of breath.     · You feel dizzy or lightheaded, or you feel like you may faint.     · Your heart rate becomes irregular.     · You can feel your heart flutter in your chest or skip heartbeats. Tell your doctor if these symptoms are new or worse. Watch closely for changes in your health, and be sure to contact your doctor if you have any problems. Where can you learn more? Go to http://www.gray.com/  Enter U020 in the search box to learn more about \"Atrial Fibrillation: Care Instructions. \"  Current as of: December 16, 2019               Content Version: 12.6  © 2332-2620 [employer]. Care instructions adapted under license by [employer] (which disclaims liability or warranty for this information). If you have questions about a medical condition or this instruction, always ask your healthcare professional. [address] any warranty or liability for your use of this information.

## 2021-03-11 NOTE — PROGRESS NOTES
Bedside and Verbal shift change report given to Debbi Melchor (oncoming nurse) by Moses Blair RN (offgoing nurse). Report included the following information SBAR, MAR and Cardiac Rhythm NSR .

## 2021-03-17 LAB
BACTERIA SPEC CULT: NORMAL
SPECIAL REQUESTS,SREQ: NORMAL

## 2022-03-18 PROBLEM — Z86.73 HISTORY OF CVA (CEREBROVASCULAR ACCIDENT): Status: ACTIVE | Noted: 2020-12-14

## 2022-03-18 PROBLEM — R78.81 BACTEREMIA: Status: ACTIVE | Noted: 2021-03-06

## 2022-03-18 PROBLEM — D64.9 ANEMIA: Status: ACTIVE | Noted: 2021-03-06

## 2022-03-18 PROBLEM — R53.1 ASTHENIA: Status: ACTIVE | Noted: 2020-12-14

## 2022-03-18 PROBLEM — I10 HYPERTENSION: Status: ACTIVE | Noted: 2021-03-05

## 2022-03-19 PROBLEM — R13.12 OROPHARYNGEAL DYSPHAGIA: Status: ACTIVE | Noted: 2020-12-14

## 2022-03-19 PROBLEM — I16.0 HYPERTENSIVE URGENCY: Status: ACTIVE | Noted: 2020-12-14

## 2022-03-19 PROBLEM — I48.91 ATRIAL FIBRILLATION WITH RVR (HCC): Status: ACTIVE | Noted: 2021-03-05

## 2022-03-19 PROBLEM — Z20.822 LAB TEST NEGATIVE FOR COVID-19 VIRUS: Status: ACTIVE | Noted: 2020-12-14

## 2022-03-19 PROBLEM — E83.42 HYPOMAGNESEMIA: Status: ACTIVE | Noted: 2021-03-05

## 2022-03-20 PROBLEM — G81.94 LEFT HEMIPLEGIA (HCC): Status: ACTIVE | Noted: 2020-12-14

## 2022-03-20 PROBLEM — R56.9 SEIZURE (HCC): Status: ACTIVE | Noted: 2020-12-02

## 2022-03-20 PROBLEM — K86.89 PANCREATIC MASS: Status: ACTIVE | Noted: 2021-02-16

## 2022-03-20 PROBLEM — E87.6 HYPOKALEMIA: Status: ACTIVE | Noted: 2020-12-14

## 2022-09-05 NOTE — PROGRESS NOTES
Anesthesia Pre Eval Note    Anesthesia ROS/Med Hx        Anesthetic Complication History:  Patient does not have a history of anesthetic complications      Pulmonary Review:  Comments: COVID +    Neuro/Psych Review:  Patient does not have a neuro/psych history       Cardiovascular Review:  Patient does not have a cardiovascular history       GI/HEPATIC/RENAL Review:  Patient does not have a GI/hepatic/renalhistory       End/Other Review:  Patient does not have an endo/other history        Relevant Problems   No relevant active problems       Physical Exam     Airway   Mallampati: II  TM Distance: >3 FB  Neck ROM: Full    Cardiovascular  Cardiovascular exam normal  Cardio Rhythm: Regular  Cardio Rate: Normal    Pulmonary Exam  Pulmonary exam normal  Breath sounds clear to auscultation:  Yes      Anesthesia Plan:    ASA Status: 2Emergent    Anesthesia Type: General    Induction: Intravenous  Preferred Airway Type: ETT  Maintenance: Inhalational    Post-op Pain Management: Per Surgeon      Checklist  Reviewed: NPO Status, Allergies, Medications, Problem list and Past Med History  Consent/Risks Discussed Statement:  The proposed anesthetic plan, including its risks and benefits, have been discussed with the Patient along with the risks and benefits of alternatives. Questions were encouraged and answered and the patient and/or representative understands and agrees to proceed.    I have discussed elements of the patient's history or examination, as noted above and/or as follows, that place the patient at higher risk of complications; pulmonary disease.    I discussed with the patient (and/or patient's legal representative) the risks and benefits of the proposed anesthesia plan, General, which may include services performed by other anesthesia providers.    Alternative anesthesia plans, if available, were reviewed with the patient (and/or patient's legal representative). Discussion has been held with the patient (and/or  Progress note    Subjective:   Chief Complaint : seizure-like activity                                Worsening left sided weakness prior to arrival.   Source of information : EMS, patients grand-daughter. History of present illness:     89F, h.o breast cancer and stroke (prior left sided wekaness) with seizure like activity and left sided weakness prior to arrival.   Being treated for seizures with possible pneumonia. MRI negative for acute stroke but PRES syndrome cannot be ruled out given hi blood pressure  Per grand-daughter, at baseline she is ambulatory with walker. Also has a left sided weakness from stroke in 2019    alert, pleasantly confused aao x 3. Acknowledges less sharp memory. nad  Denies specific complaints. BP trend better        Past Medical History:   Diagnosis Date    [facility] ([facility] [address]     Stroke (cerebrum) ([facility] [address] 2019     History reviewed. No pertinent surgical history. History reviewed. No pertinent family history. Social History     Tobacco Use    Smoking status: Not on file   Substance Use Topics    Alcohol use: Not on file       Prior to Admission medications    Not on File     No Known Allergies          Review of Systems:  Review of Systems   Constitutional: Negative for chills and fever. HENT: Negative. Eyes: Negative. Respiratory: Negative for cough. Cardiovascular: Negative. Gastrointestinal: Negative. Genitourinary: Negative. Musculoskeletal: Negative. Skin: Negative. Neurological: Positive for weakness. Endo/Heme/Allergies: Negative. Psychiatric/Behavioral: Negative. Vitals:     Visit Vitals  BP (!) 136/57   Pulse 73   Temp 98.1 °F (36.7 °C)   Resp 18   Ht 5' 7\" (1.702 m)   Wt 113.4 kg (250 lb)   SpO2 97%   BMI 39.16 kg/m²       Physical Exam:   Constitutional: elderly, nad, looks stated age. Speaking clearly  Head: Normocephalic and atraumatic. Mouth/Throat: Moist mucous membranes. Eyes: EOMI. No scleral icterus.   Neck: Neck supple. Cardiovascular: regular rate and rhythm. Normal heart sounds. No murmur, rub, or gallop. Good pulses throughout. Pulmonary/Chest: No respiratory distress. Clear to auscultation bilaterally. No wheezes, rales, or rhonchi. Abdominal/GI: BS normal, Soft, non-tender, non-distended. No rebound or guarding. Musculoskeletal: No gross injuries or deformities. Neurological: Awake and oriented x 2, can't tell me why here, oriented still to place and time and uyear  Psych: Calm, not agitated   Skin: Skin is warm and dry. No rash noted. Data Review:   Recent Results (from the past 24 hour(s))   GLUCOSE, POC    Collection Time: 12/12/20  7:54 AM   Result Value Ref Range    Glucose (POC) 138 (H) 65 - 100 mg/dL    Performed by [facility]    GLUCOSE, POC    Collection Time: 12/12/20 11:54 AM   Result Value Ref Range    Glucose (POC) 204 (H) 65 - 100 mg/dL    Performed by [employer]          MRI Brain 12/3:  IMPRESSION: Cerebral atrophy. Right posterior parietal encephalomalacia. Periventricular/subcortical white matter gliosis. Evidence for advanced nonacute end vessel occlusive change.     Diffusion-weighted images demonstrate no signal abnormality/diffusion  restriction to suggest the presence of acute vascular insult. Assessment and Plan :     --Seizures : keppra 500mg BID. Seizures precaution. No seizures reported here overnight. --No acute stroke cva per mri- result above reviewed. --Encephalopathy vs dementia, seems inproved though aao x 2.    --HTN urgency: trend improved, amlodipine 10 mg daily, metoprolol 100 mg bid, Hydralazine increased 50 mg tid, Losartan 100 mg daily. Prn labetolol. Remains adequate    --H/o CVA + left side weak, chronic but acutely    --RENAN: s/t prerenal. Cr improved. --Hypokalemia: repleted, follow, am labs pending    --Hypernatremia na     --Possible Pneumonia: azithromycin x 5 days. Afebrile. Resp status stable on 2 l/m.  Covidd not patient's legal representative) regarding risks of anesthesia, which include Nausea, Vomiting, Dental Injury and sore throat and emergent situations that may require change in anesthesia plan.    The patient (and/or patient's legal representative) has indicated understanding, his/her questions have been answered, and he/she wishes to proceed with the planned anesthetic.    Blood Products: Not Anticipated     detected. --Oral pharyngeal dysphagia: ST following, ground/ntl, free water protocol. Aspiration/gerd precautions, advance safely as tolerated, st following for further recs. --Covid Not Detected 12/4    PLAN:  IRF vs snf when ready, no iv meds. DVT ppx: heparin SubQ, pending home meds.      Signed By: Purvi Alvarez MD     December 12, 2020

## 2022-11-01 NOTE — PROGRESS NOTES
Follow up visit with pt. Pt was working at lunch and intermittently dozing. Verbal exchange was brief as she was to tired to engage conversation. She identified no spiritual care needs at the time of visit.     Chaplain Dinero MDiv, MS, [address] ([id number]) Doxycycline Pregnancy And Lactation Text: This medication is Pregnancy Category D and not consider safe during pregnancy. It is also excreted in breast milk but is considered safe for shorter treatment courses.

## 2023-05-20 RX ORDER — CEPHALEXIN 500 MG/1
500 CAPSULE ORAL 2 TIMES DAILY
COMMUNITY
Start: 2021-03-11

## 2023-05-20 RX ORDER — OXYCODONE HYDROCHLORIDE AND ACETAMINOPHEN 5; 325 MG/1; MG/1
1 TABLET ORAL EVERY 4 HOURS PRN
COMMUNITY

## 2023-05-20 RX ORDER — OMEPRAZOLE 40 MG/1
40 CAPSULE, DELAYED RELEASE ORAL DAILY
COMMUNITY

## 2023-05-20 RX ORDER — PSEUDOEPHEDRINE HCL 30 MG
100 TABLET ORAL 2 TIMES DAILY PRN
COMMUNITY

## 2023-05-20 RX ORDER — HYDRALAZINE HYDROCHLORIDE 50 MG/1
75 TABLET, FILM COATED ORAL 3 TIMES DAILY
COMMUNITY
Start: 2021-02-23

## 2023-05-20 RX ORDER — ACETAMINOPHEN 325 MG/1
650 TABLET ORAL EVERY 6 HOURS PRN
COMMUNITY
Start: 2021-02-23

## 2023-05-20 RX ORDER — PROMETHAZINE HYDROCHLORIDE 25 MG/1
25 TABLET ORAL 2 TIMES DAILY PRN
COMMUNITY

## 2023-05-20 RX ORDER — METOPROLOL TARTRATE 100 MG/1
100 TABLET ORAL 2 TIMES DAILY
COMMUNITY
Start: 2020-12-14

## 2023-05-20 RX ORDER — TRAZODONE HYDROCHLORIDE 50 MG/1
50 TABLET ORAL NIGHTLY
COMMUNITY

## 2023-05-20 RX ORDER — LEVETIRACETAM 500 MG/1
500 TABLET ORAL 2 TIMES DAILY
COMMUNITY
Start: 2021-02-23

## 2023-08-18 NOTE — PROGRESS NOTES
HPI     Annual Exam     Additional comments: LDE: 05/03/2022           Comments    43 YO female presents today for an annual eye exam. Patient states that   she is having trouble with OS twitching, and eyes being tired often.   Peripheral vision at night time seems to be off, and floaters OU. Does not   wear ctl at this time. Wears glasses prescribed from 2022.     Hemoglobin A1C       Date                     Value               Ref Range             Status                02/25/2023               6.6 (H)             4.0 - 5.6 %           Final                 11/17/2022               6.6 (H)             4.0 - 5.6 %           Final                 07/02/2022               7.1 (H)             4.0 - 5.6 %           Final                        Last edited by Dwayne Gaston, OD on 8/18/2023  8:51 AM.            Assessment /Plan     For exam results, see Encounter Report.    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 Examination of eyes and vision    2. 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 Recommended continued strong blood sugar control and continued care with PCP. Monitor yearly.     3. Nuclear sclerosis, bilateral  Mild, not visually significant. Discussed possible ocular affects of cataracts. Acceptable BCVA OU. Discussed treatment options. Surgery not recommended at this time. Monitor yearly.     4. Refractive error  Dispensed updated spectacle Rx. Discussed various spectacle lens options, SVL vs PALs. Discussed adaptation period to new specs.   No longer wears cls -- trouble with insertion, declines fitting today                      THERESA recv'd message from [facility], awaiting MD approval from [employer] [facility], and will start auth once that is given.           Tunde Chawla, MSW

## 2023-12-21 NOTE — ACP (ADVANCE CARE PLANNING)
Advance Care Planning     Advance Care Planning (ACP) Physician/NP/PA Conversation      Date of Conversation: 3/5/2021  Conducted with: Patient does not have capacity, with POA as below    Healthcare Decision Maker:     Primary Decision Maker (Active): lalo wyatt Devora Roth - [phone]  Click here to complete [id number] including selection of the Healthcare Decision Maker Relationship (ie \"Primary\")  Today we documented Decision Maker(s) consistent with Legal Next of Kin hierarchy. Care Preferences:    Hospitalization: \"If your health worsens and it becomes clear that your chance of recovery is unlikely, what would be your preference regarding hospitalization? \"  The patient would prefer hospitalization. Ventilation: \"If you were unable to breathe on your own and your chance of recovery was unlikely, what would be your preference about the use of a ventilator (breathing machine) if it was available to you? \"   The patient would NOT desire the use of a ventilator. Resuscitation: \"In the event your heart stopped as a result of an underlying serious health condition, would you want attempts to be made to restart your heart, or would you prefer a natural death? \"   No, do NOT attempt to resuscitate.     Additional topics discussed: ventilation preferences, hospitalization preferences and resuscitation preferences    Conversation Outcomes / Follow-Up Plan:   ACP complete - no further action today  Reviewed DNR/DNI and patient elects DNR order - referred to ACP Clinical Specialist & placed order     Length of Voluntary ACP Conversation in minutes:  20 minutes    Andrea Mills MD No

## (undated) DEVICE — REM POLYHESIVE ADULT PATIENT RETURN ELECTRODE: Brand: VALLEYLAB

## (undated) DEVICE — SYSTEM ENDOSCP US DEL 22GA W/ FNA NDL BEAC

## (undated) DEVICE — DEVICE LCK BILI RAP EXCHG OLPS --

## (undated) DEVICE — GARMENT,MEDLINE,DVT,INT,CALF,LG, GEN2: Brand: MEDLINE

## (undated) DEVICE — TUBING HYDR IRR --

## (undated) DEVICE — SPHINCTEROTOME: Brand: DREAMTOME™ RX 44

## (undated) DEVICE — NEEDLE BX DIA22GA FN ENDOSCP SHARKCORE